# Patient Record
Sex: MALE | Race: WHITE | NOT HISPANIC OR LATINO | URBAN - METROPOLITAN AREA
[De-identification: names, ages, dates, MRNs, and addresses within clinical notes are randomized per-mention and may not be internally consistent; named-entity substitution may affect disease eponyms.]

---

## 2018-02-10 ENCOUNTER — OUTPATIENT (OUTPATIENT)
Dept: OUTPATIENT SERVICES | Facility: HOSPITAL | Age: 83
LOS: 1 days | Discharge: HOME | End: 2018-02-10

## 2018-02-10 DIAGNOSIS — I25.10 ATHEROSCLEROTIC HEART DISEASE OF NATIVE CORONARY ARTERY WITHOUT ANGINA PECTORIS: ICD-10-CM

## 2018-02-10 DIAGNOSIS — B58.81 TOXOPLASMA MYOCARDITIS: ICD-10-CM

## 2018-02-10 DIAGNOSIS — E11.9 TYPE 2 DIABETES MELLITUS WITHOUT COMPLICATIONS: ICD-10-CM

## 2018-02-10 DIAGNOSIS — R94.6 ABNORMAL RESULTS OF THYROID FUNCTION STUDIES: ICD-10-CM

## 2018-02-10 DIAGNOSIS — N42.9 DISORDER OF PROSTATE, UNSPECIFIED: ICD-10-CM

## 2018-02-10 DIAGNOSIS — R78.89 FINDING OF OTHER SPECIFIED SUBSTANCES, NOT NORMALLY FOUND IN BLOOD: ICD-10-CM

## 2019-06-04 ENCOUNTER — INPATIENT (INPATIENT)
Facility: HOSPITAL | Age: 84
LOS: 9 days | Discharge: NURSING, REHAB OR LONG-TERM CARE | End: 2019-06-14
Attending: INTERNAL MEDICINE | Admitting: INTERNAL MEDICINE
Payer: MEDICARE

## 2019-06-04 VITALS
HEIGHT: 68 IN | RESPIRATION RATE: 20 BRPM | TEMPERATURE: 97 F | WEIGHT: 184.09 LBS | SYSTOLIC BLOOD PRESSURE: 171 MMHG | DIASTOLIC BLOOD PRESSURE: 90 MMHG | OXYGEN SATURATION: 96 % | HEART RATE: 91 BPM

## 2019-06-04 DIAGNOSIS — S06.5X9S TRAUMATIC SUBDURAL HEMORRHAGE WITH LOSS OF CONSCIOUSNESS OF UNSPECIFIED DURATION, SEQUELA: ICD-10-CM

## 2019-06-04 LAB
ALBUMIN SERPL ELPH-MCNC: 4 G/DL — SIGNIFICANT CHANGE UP (ref 3.5–5.2)
ALP SERPL-CCNC: 36 U/L — SIGNIFICANT CHANGE UP (ref 30–115)
ALT FLD-CCNC: 33 U/L — SIGNIFICANT CHANGE UP (ref 0–41)
ANION GAP SERPL CALC-SCNC: 18 MMOL/L — HIGH (ref 7–14)
APPEARANCE UR: CLEAR — SIGNIFICANT CHANGE UP
APTT BLD: 33.8 SEC — SIGNIFICANT CHANGE UP (ref 27–39.2)
AST SERPL-CCNC: 32 U/L — SIGNIFICANT CHANGE UP (ref 0–41)
BACTERIA # UR AUTO: ABNORMAL /HPF
BASE EXCESS BLDV CALC-SCNC: 1.6 MMOL/L — SIGNIFICANT CHANGE UP (ref -2–2)
BASOPHILS # BLD AUTO: 0.03 K/UL — SIGNIFICANT CHANGE UP (ref 0–0.2)
BASOPHILS NFR BLD AUTO: 0.2 % — SIGNIFICANT CHANGE UP (ref 0–1)
BILIRUB DIRECT SERPL-MCNC: 0.2 MG/DL — SIGNIFICANT CHANGE UP (ref 0–0.2)
BILIRUB INDIRECT FLD-MCNC: 0.7 MG/DL — SIGNIFICANT CHANGE UP (ref 0.2–1.2)
BILIRUB SERPL-MCNC: 0.9 MG/DL — SIGNIFICANT CHANGE UP (ref 0.2–1.2)
BILIRUB UR-MCNC: NEGATIVE — SIGNIFICANT CHANGE UP
BLD GP AB SCN SERPL QL: SIGNIFICANT CHANGE UP
BUN SERPL-MCNC: 16 MG/DL — SIGNIFICANT CHANGE UP (ref 10–20)
CA-I SERPL-SCNC: 1.16 MMOL/L — SIGNIFICANT CHANGE UP (ref 1.12–1.3)
CALCIUM SERPL-MCNC: 9.1 MG/DL — SIGNIFICANT CHANGE UP (ref 8.5–10.1)
CHLORIDE SERPL-SCNC: 102 MMOL/L — SIGNIFICANT CHANGE UP (ref 98–110)
CK SERPL-CCNC: 221 U/L — SIGNIFICANT CHANGE UP (ref 0–225)
CO2 SERPL-SCNC: 21 MMOL/L — SIGNIFICANT CHANGE UP (ref 17–32)
COLOR SPEC: YELLOW — SIGNIFICANT CHANGE UP
COMMENT - URINE: SIGNIFICANT CHANGE UP
CREAT SERPL-MCNC: 0.9 MG/DL — SIGNIFICANT CHANGE UP (ref 0.7–1.5)
DIFF PNL FLD: NEGATIVE — SIGNIFICANT CHANGE UP
EOSINOPHIL # BLD AUTO: 0 K/UL — SIGNIFICANT CHANGE UP (ref 0–0.7)
EOSINOPHIL NFR BLD AUTO: 0 % — SIGNIFICANT CHANGE UP (ref 0–8)
EPI CELLS # UR: ABNORMAL /HPF
ETHANOL SERPL-MCNC: <10 MG/DL — SIGNIFICANT CHANGE UP
GAS PNL BLDV: 138 MMOL/L — SIGNIFICANT CHANGE UP (ref 136–145)
GAS PNL BLDV: SIGNIFICANT CHANGE UP
GLUCOSE SERPL-MCNC: 187 MG/DL — HIGH (ref 70–99)
GLUCOSE UR QL: 100 MG/DL
HCO3 BLDV-SCNC: 26 MMOL/L — SIGNIFICANT CHANGE UP (ref 22–29)
HCT VFR BLD CALC: 43 % — SIGNIFICANT CHANGE UP (ref 42–52)
HCT VFR BLDA CALC: 48.3 % — HIGH (ref 34–44)
HGB BLD CALC-MCNC: 15.7 G/DL — SIGNIFICANT CHANGE UP (ref 14–18)
HGB BLD-MCNC: 14.1 G/DL — SIGNIFICANT CHANGE UP (ref 14–18)
IMM GRANULOCYTES NFR BLD AUTO: 0.4 % — HIGH (ref 0.1–0.3)
INR BLD: 1.11 RATIO — SIGNIFICANT CHANGE UP (ref 0.65–1.3)
KETONES UR-MCNC: 40
LACTATE BLDV-MCNC: 4.5 MMOL/L — HIGH (ref 0.5–1.6)
LACTATE SERPL-SCNC: 3.9 MMOL/L — HIGH (ref 0.5–2.2)
LEUKOCYTE ESTERASE UR-ACNC: NEGATIVE — SIGNIFICANT CHANGE UP
LIDOCAIN IGE QN: 19 U/L — SIGNIFICANT CHANGE UP (ref 7–60)
LYMPHOCYTES # BLD AUTO: 0.61 K/UL — LOW (ref 1.2–3.4)
LYMPHOCYTES # BLD AUTO: 4.3 % — LOW (ref 20.5–51.1)
MCHC RBC-ENTMCNC: 29.1 PG — SIGNIFICANT CHANGE UP (ref 27–31)
MCHC RBC-ENTMCNC: 32.8 G/DL — SIGNIFICANT CHANGE UP (ref 32–37)
MCV RBC AUTO: 88.7 FL — SIGNIFICANT CHANGE UP (ref 80–94)
MONOCYTES # BLD AUTO: 0.77 K/UL — HIGH (ref 0.1–0.6)
MONOCYTES NFR BLD AUTO: 5.5 % — SIGNIFICANT CHANGE UP (ref 1.7–9.3)
NEUTROPHILS # BLD AUTO: 12.57 K/UL — HIGH (ref 1.4–6.5)
NEUTROPHILS NFR BLD AUTO: 89.6 % — HIGH (ref 42.2–75.2)
NITRITE UR-MCNC: NEGATIVE — SIGNIFICANT CHANGE UP
NRBC # BLD: 0 /100 WBCS — SIGNIFICANT CHANGE UP (ref 0–0)
PCO2 BLDV: 40 MMHG — LOW (ref 41–51)
PH BLDV: 7.42 — SIGNIFICANT CHANGE UP (ref 7.26–7.43)
PH UR: 7 — SIGNIFICANT CHANGE UP (ref 5–8)
PLATELET # BLD AUTO: 181 K/UL — SIGNIFICANT CHANGE UP (ref 130–400)
PO2 BLDV: 25 MMHG — SIGNIFICANT CHANGE UP (ref 20–40)
POTASSIUM BLDV-SCNC: 3.8 MMOL/L — SIGNIFICANT CHANGE UP (ref 3.3–5.6)
POTASSIUM SERPL-MCNC: 4.3 MMOL/L — SIGNIFICANT CHANGE UP (ref 3.5–5)
POTASSIUM SERPL-SCNC: 4.3 MMOL/L — SIGNIFICANT CHANGE UP (ref 3.5–5)
PROT SERPL-MCNC: 6.7 G/DL — SIGNIFICANT CHANGE UP (ref 6–8)
PROT UR-MCNC: 30 MG/DL
PROTHROM AB SERPL-ACNC: 12.8 SEC — SIGNIFICANT CHANGE UP (ref 9.95–12.87)
RBC # BLD: 4.85 M/UL — SIGNIFICANT CHANGE UP (ref 4.7–6.1)
RBC # FLD: 13.6 % — SIGNIFICANT CHANGE UP (ref 11.5–14.5)
SAO2 % BLDV: 47 % — SIGNIFICANT CHANGE UP
SODIUM SERPL-SCNC: 141 MMOL/L — SIGNIFICANT CHANGE UP (ref 135–146)
SP GR SPEC: 1.02 — SIGNIFICANT CHANGE UP (ref 1.01–1.03)
UROBILINOGEN FLD QL: 0.2 MG/DL — SIGNIFICANT CHANGE UP (ref 0.2–0.2)
WBC # BLD: 14.04 K/UL — HIGH (ref 4.8–10.8)
WBC # FLD AUTO: 14.04 K/UL — HIGH (ref 4.8–10.8)

## 2019-06-04 PROCEDURE — 72125 CT NECK SPINE W/O DYE: CPT | Mod: 26

## 2019-06-04 PROCEDURE — 73090 X-RAY EXAM OF FOREARM: CPT | Mod: 26,LT

## 2019-06-04 PROCEDURE — 93010 ELECTROCARDIOGRAM REPORT: CPT

## 2019-06-04 PROCEDURE — 99291 CRITICAL CARE FIRST HOUR: CPT

## 2019-06-04 PROCEDURE — 72170 X-RAY EXAM OF PELVIS: CPT | Mod: 26

## 2019-06-04 PROCEDURE — 70450 CT HEAD/BRAIN W/O DYE: CPT | Mod: 26

## 2019-06-04 PROCEDURE — 71045 X-RAY EXAM CHEST 1 VIEW: CPT | Mod: 26

## 2019-06-04 PROCEDURE — 74177 CT ABD & PELVIS W/CONTRAST: CPT | Mod: 26

## 2019-06-04 PROCEDURE — 73562 X-RAY EXAM OF KNEE 3: CPT | Mod: 26,50

## 2019-06-04 PROCEDURE — 73610 X-RAY EXAM OF ANKLE: CPT | Mod: 26,50

## 2019-06-04 PROCEDURE — 71260 CT THORAX DX C+: CPT | Mod: 26

## 2019-06-04 RX ORDER — SODIUM CHLORIDE 9 MG/ML
1000 INJECTION, SOLUTION INTRAVENOUS
Refills: 0 | Status: DISCONTINUED | OUTPATIENT
Start: 2019-06-04 | End: 2019-06-05

## 2019-06-04 RX ORDER — LEVETIRACETAM 250 MG/1
500 TABLET, FILM COATED ORAL EVERY 12 HOURS
Refills: 0 | Status: DISCONTINUED | OUTPATIENT
Start: 2019-06-04 | End: 2019-06-10

## 2019-06-04 RX ORDER — MEMANTINE HYDROCHLORIDE 10 MG/1
0 TABLET ORAL
Qty: 60 | Refills: 0 | DISCHARGE

## 2019-06-04 RX ORDER — PANTOPRAZOLE SODIUM 20 MG/1
40 TABLET, DELAYED RELEASE ORAL
Refills: 0 | Status: DISCONTINUED | OUTPATIENT
Start: 2019-06-04 | End: 2019-06-12

## 2019-06-04 RX ORDER — LEVETIRACETAM 250 MG/1
1000 TABLET, FILM COATED ORAL ONCE
Refills: 0 | Status: COMPLETED | OUTPATIENT
Start: 2019-06-04 | End: 2019-06-04

## 2019-06-04 RX ORDER — ROSUVASTATIN CALCIUM 5 MG/1
0 TABLET ORAL
Qty: 90 | Refills: 0 | DISCHARGE

## 2019-06-04 RX ORDER — CHLORHEXIDINE GLUCONATE 213 G/1000ML
1 SOLUTION TOPICAL
Refills: 0 | Status: DISCONTINUED | OUTPATIENT
Start: 2019-06-04 | End: 2019-06-14

## 2019-06-04 RX ORDER — ACETAMINOPHEN 500 MG
650 TABLET ORAL EVERY 6 HOURS
Refills: 0 | Status: DISCONTINUED | OUTPATIENT
Start: 2019-06-04 | End: 2019-06-13

## 2019-06-04 RX ORDER — SODIUM CHLORIDE 9 MG/ML
1000 INJECTION, SOLUTION INTRAVENOUS ONCE
Refills: 0 | Status: COMPLETED | OUTPATIENT
Start: 2019-06-04 | End: 2019-06-04

## 2019-06-04 RX ORDER — ONDANSETRON 8 MG/1
4 TABLET, FILM COATED ORAL ONCE
Refills: 0 | Status: COMPLETED | OUTPATIENT
Start: 2019-06-04 | End: 2019-06-04

## 2019-06-04 RX ADMIN — LEVETIRACETAM 400 MILLIGRAM(S): 250 TABLET, FILM COATED ORAL at 21:50

## 2019-06-04 RX ADMIN — SODIUM CHLORIDE 1000 MILLILITER(S): 9 INJECTION, SOLUTION INTRAVENOUS at 18:36

## 2019-06-04 RX ADMIN — ONDANSETRON 4 MILLIGRAM(S): 8 TABLET, FILM COATED ORAL at 18:00

## 2019-06-04 NOTE — ED PROVIDER NOTE - PROGRESS NOTE DETAILS
ATTENDING NOTE: 85 y/o male states he fell in his house. No LOC. Noted by daughter to have bruising to head. No N/V. No seizure activity.  O/E: Constitutional: Non-toxic in appearance. Head: Large hematoma to left parieto-temporal area.. Eyes: PERRL. EOMI. ENT: No hemotympanum. Neck: No c-spine tenderness. Pulmonary: Lungs equal b/l. GI: ABD soft, no tenderness. Extremities: Pelvis stable, no hip tenderness. Extremities with skin tear to left forearm. Extensive ecchymosis to right elbow and forearm. No deformity. Back: No vertebral tenderness. Large abrasions and hematoma to right upper back. Neuro: A&Ox3, GCS 15.   A/P: CT's. Tdap. s/o Dr. Antonio accepting s/o from Dr. Reynolds. ct head obtained, suspected ICH. trauma consult placed. call placed to radiology for read.

## 2019-06-04 NOTE — ED PROVIDER NOTE - OBJECTIVE STATEMENT
83 y/o male BIBEMS, patient states he fell in his house. No LOC. Noted by daughter to have bruising to head. No N/V. No seizure activity. no blood thinners. No recent illness, no fever, no cp or sob. no abdominal pain.

## 2019-06-04 NOTE — CONSULT NOTE ADULT - ATTENDING COMMENTS
agree with sicu admission for risk of neurologic and respiratory deterioration agree with sicu admission for risk of neurologic and respiratory deterioration  provided over 30 minutes of direct critical care to this patient

## 2019-06-04 NOTE — ED PROVIDER NOTE - CLINICAL SUMMARY MEDICAL DECISION MAKING FREE TEXT BOX
pt presented for eval of confusion and fall. pt with fall last night. today felt weak and had hard time getting up from bed. daughter went to pts house, found pt confused and not himself. pt found to have sah, subdural. seen by neurosurgery, keppra, repeat ct head. seen by trauma team. admit to SICU.

## 2019-06-04 NOTE — CHART NOTE - NSCHARTNOTEFT_GEN_A_CORE
sdh and sah with no neurologic changes, will admit to sicu and observe over night f/u neurodurgical recomendations

## 2019-06-04 NOTE — ED ADULT NURSE NOTE - NSFALLRSKASSISTTYPE_ED_ALL_ED
Problem: Patient Care Overview  Goal: Individualization & Mutuality  Patient will be cooperative with assessments  Patient will eat at least 75% meals  Patient will sleep 6-8 hours     0015 in bed with easy respirations,presenting sleeping.[id number] patient continues to sleep at this  Time.    Problem: Depressive Symptoms  Goal: Depressive Symptoms  Signs and symptoms of listed problems will be absent or manageable.  Patient's depression will be improved by discharge  Patient will remain calm and in control    Patient will verbalize if anxiety and or anger is presenting a problem.   0015 in bed with easy respirations, presenting sleeping.       Standing/Walking/Toileting

## 2019-06-04 NOTE — ED ADULT NURSE REASSESSMENT NOTE - NS ED NURSE REASSESS COMMENT FT1
pt resting comfortably, denies any discomfort. no distress noted at this time. iv intact, safety maintained, on monitor. VSS. awaiting additional testing. frequent urination. md aware. periods of confusion.

## 2019-06-04 NOTE — ED PROVIDER NOTE - CARE PLAN
Principal Discharge DX:	Subdural hematoma due to concussion, with loss of consciousness, sequela  Secondary Diagnosis:	Fall Principal Discharge DX:	Subdural hematoma due to concussion, with loss of consciousness, sequela  Secondary Diagnosis:	Fall  Secondary Diagnosis:	Subarachnoid hemorrhage, traumatic

## 2019-06-04 NOTE — ED ADULT NURSE NOTE - CHIEF COMPLAINT QUOTE
patient bought in by daughter for evaluation. as per patients daughter "he is not acting like himself." patient normally active, lives alone. patient is A&O x4 in triage. patient has a bruise on right elbow, an abrasion to the left underside of arm and a bump on the back right of his head. patient does not recall how he sustained the injuries. patients gait is unsteady.  unknown if patient is on blood thinners.  pt taken to critical care.

## 2019-06-04 NOTE — ED PROVIDER NOTE - PHYSICAL EXAMINATION
Non-toxic in appearance. Large hematoma to left parieto-temporal area. PERRL. EOMI.No hemotympanum. Neck: No c-spine tenderness. No nasal discharge. MMM. Neck supple, non tender, full ROM. RRR no MRG +S1S2. CTA b/l. Abd s NT ND +BS. Ext WWP x4, moving all extremities, no edema. 2+ equal pulses throughout. Cooperative, appropriate. CN2-12 grossly intact no sensory or motor deficits throughout, no drift, rapid alternating wnl, no ataxia, neg romberg. skin tear to left forearm, ecchymosis to right elbow and forearm, abrasions to upper back. GCS 15

## 2019-06-04 NOTE — ED ADULT NURSE NOTE - LOCATION
Abrasions to: R upper back, mid lumbar, left buttock, L occipital abrasion, R knee abrasion, R ankle abrasion, R 2-5 toes, L knee abrasion, L ankle, L 1-4 toes, L forearm, L elbow, R elbow, R upper arm. Bruise: R wrist

## 2019-06-04 NOTE — CONSULT NOTE ADULT - SUBJECTIVE AND OBJECTIVE BOX
SICU Consultation Note  =====================================================  84y Male  HPI:  84M w/ PMHx of HTN, HLD, BPH, Glaucoma, dementia? s/p fall at home, unwitnessed, lives alone. Patients daughter called her father this morning and he did not sound normal to her. He said he was tired and went back to sleep. This was at 10am and very unusual for the pt as per daughter. Daughter then drove from [place] to her fathers home and noted pt has large contusion to his scalp. Daughter then brought pt to the ED for evaluation. Patient does not take any blood thinners.     PAST MEDICAL & SURGICAL HISTORY:  HTN, HLD, BPH, Glaucoma    Home Meds: Home Medications:  AMLODIPINE   TAB 2.5MG:  (2019 23:38)  ATENOLOL     TAB 25MG:  (2019 23:38)  FLUOROMETHOL ELLA 0.1% OP:  (2019 23:38)  MEMANTINE    TAB HCL 10MG:  (2019 23:38)  ROSUVASTATIN TAB 5MG:  (2019 23:38)  TAMSULOSIN   CAP 0.4MG:  (2019 23:38)  TIMOLOL MAL  SOL 0.5% OP:  (2019 23:38)    Allergies: AllergiesNo Known Allergies    Advanced Directives: Presumed Full Code     CURRENT MEDICATIONS:   --------------------------------------------------------------------------------------  Neurologic Medications  acetaminophen   Tablet .. 650 milliGRAM(s) Oral every 6 hours  levETIRAcetam  IVPB 500 milliGRAM(s) IV Intermittent every 12 hours    Gastrointestinal Medications  lactated ringers. 1000 milliLiter(s) IV Continuous <Continuous>  pantoprazole    Tablet 40 milliGRAM(s) Oral before breakfast    Genitourinary Medications    Hematologic/Oncologic Medications    Antimicrobial/Immunologic Medications    Endocrine/Metabolic Medications    Topical/Other Medications  chlorhexidine 4% Liquid 1 Application(s) Topical <User Schedule>    --------------------------------------------------------------------------------------    VITAL SIGNS, INS/OUTS (last 24 hours):  --------------------------------------------------------------------------------------  ICU Vital Signs Last 24 Hrs  T(C): 36.3 (2019 16:01), Max: 36.3 (2019 16:01)  T(F): 97.3 (2019 16:01), Max: 97.3 (2019 16:01)  HR: 91 (2019 16:01) (91 - 91)  BP: 171/90 (2019 16:01) (171/90 - 171/90)  RR: 20 (2019 16:01) (20 - 20)  SpO2: 96% (2019 16:01) (96% - 96%)    I&O's Summary    --------------------------------------------------------------------------------------  EXAM:  General/Neuro  GCS: 15  Exam: Normal, NAD, alert, oriented x 3, no focal deficits. PERRLA, Abrasion w/ associated left posterior scalp hematoma.  Face symmetrical tongue is midline speech is clear and fluent  Motor: 5/5 UE/LE all muscle groups     Respiratory  Exam: Lungs clear to auscultation, Normal expansion/effort.     Cardiovascular  Exam: S1, S2.  Regular rate and rhythm. No Peripheral edema     GI  Exam: Abdomen soft, Non-tender, Non-distended.  Current Diet:  NPO    Extremities  Exam: Extremities warm, pink, well-perfused.      :   Exam: Freely voiding    Tubes/Lines/Drains  ***  [x] Peripheral IV    LABS  --------------------------------------------------------------------------------------             14.1   14.04 )-----------( 181      ( 2019 16:40 )             43.0     06-04    141  |  102  |  16  ----------------------------<  187<H>  4.3   |  21  |  0.9    Ca    9.1      2019 16:40    TPro  6.7  /  Alb  4.0  /  TBili  0.9  /  DBili  0.2  /  AST  32  /  ALT  33  /  AlkPhos  36  06-04    LIVER FUNCTIONS - ( 2019 16:40 )  Alb: 4.0 g/dL / Pro: 6.7 g/dL / ALK PHOS: 36 U/L / ALT: 33 U/L / AST: 32 U/L / GGT: x           CARDIAC MARKERS ( 2019 17:42 )  x     / x     / 221 U/L / x     / x        PT/INR - ( 2019 16:40 )   PT: 12.80 sec;   INR: 1.11 ratio      PTT - ( 2019 16:40 )  PTT:33.8 sec    CAPILLARY BLOOD GLUCOSE    POCT Blood Glucose.: 166 mg/dL (2019 16:18)    Urinalysis Basic - ( 2019 18:55 )    Color: Yellow / Appearance: Clear / S.020 / pH: x  Gluc: x / Ketone: 40  / Bili: Negative / Urobili: 0.2 mg/dL   Blood: x / Protein: 30 mg/dL / Nitrite: Negative   Leuk Esterase: Negative / RBC: x / WBC x   Sq Epi: x / Non Sq Epi: Occasional /HPF / Bacteria: Few /HPF  --------------------------------------------------------------------------------------  IMAGING RESULTS  CT Head No Cont (19 @ 20:18) >    IMPRESSION:    1.  Large left parietal/occipital extracalvarial hematoma. Slight   widening of the left lambdoid suture suspicious for nondisplaced   diastatic fracture.    2.  Subdural hemorrhage in the right frontotemporal convexity measuring   up to 8 mm in width and within the left cerebellopontine angle cistern   measuring 8 mm in width.    3.  Hemorrhagic contusions in the right frontal and temporal lobes and   left cerebellum.      4.  Scattered subarachnoid hemorrhage and small layering intraventricular   hemorrhage.    CT Cervical Spine No Cont (19 @ 20:19) >  IMPRESSION: No acute cervical spine pathology    CT Chest w/ IV Cont (19 @ 20:22) >  IMPRESSION:    No CT evidence for acute traumatic injury to the chest, abdomen or pelvis.    CT Abdomen and Pelvis w/ IV Cont (19 @ 20:23) >  IMPRESSION:    No CT evidence for acute traumatic injury to the chest, abdomen or pelvis.    ASSESSMENT:  84M w/ PMHx of HTN, HLD, BPH, Glaucoma s/p fall at home, unwitnessed, lives alone. +HT, unknown LOC, -AC. Trauma work up with SDH right frontotemporal, scattered SAH, small layering interventricular hemorrhage, Hemorrhagic contusion of Right frontal, temporal lobes, left cerebellum, nondisplaced diastatic fracture.     PLAN:    Neurologic: s/p fall, unwitnessed, syncope? SDH right frontotemporal, scattered SAH, small layering interventricular hemorrhage, Hemorrhagic contusion of Right frontal, temporal lobes, left cerebellum, nondisplaced diastatic fracture. GCS 15, AAO x 3, Monitor for changes in mental status, Pain regimen: Tylenol.  - Neuro checks: q1h  , Seizure ppx: Keppra.   - Significant imaging: CTH: SDH right frontotemporal, scattered SAH, small layering interventricular hemorrhage, Hemorrhagic contusion of Right frontal, temporal lobes, left cerebellum, nondisplaced diastatic fracture.   - Syncope workup w/ ECHO, EEG.   - Hx of dementia? takes memantine at home     Neurologic Medications  acetaminophen   Tablet .. 650 milliGRAM(s) Oral every 6 hours  levETIRAcetam  IVPB 500 milliGRAM(s) IV Intermittent every 12 hours    Respiratory: No Acute Dx, monitor for respiratory ditress, pulse oxymetry, Incentive spirometer, O2NC PRN, maintain Sat >90%  RR: 20 (19 @ 16:01) (20 - 20)  SpO2: 96% (19 @ 16:01) (96% - 96%)    Cardiovascular: No Acute Dx, Keep Normotensive,   - Hx of HTN, HLD takes atenolol, amlodipine, rosuvastatin at home    HR: 91 (19 @ 16:01) (91 - 91)  BP: 171/90 (19 @ 16:01) (171/90 - 171/90)    ECHO: Pending  EKG:  (19 @ 17:17)    Gastrointestinal/Nutrition: No Acute Dx, NPO, LR @100, GI Prophylaxis w/ Protonix 40mg Daily, Bowel regimen:, senna,    GastrointestinalMedications  pantoprazole    Tablet 40 milliGRAM(s) Oral before breakfast    Renal/Genitourinary: No Acute Dx, Freely Voiding. Strict I&O's, Replete electrolytes PRN.  - Hx of BPH takes flomax at home restarted.     BUN/Creat: 16/0.9 (19 @ 16:40)  Na:141 (19 @ 16:40)  K:4.3 (19 @ 16:40)     Hematologic: No Acute Dx, Monitor Hb, SCDs,, Hold Chemoprophylaxis    Hb:14.1 (19 @ 16:40)  Plt:181 (19 @ 16:40)  INR:1.11 (19 @ 16:40)  PTT:33.8 (19 @ 16:40)  Lactate (Blood):3.9 (19 @ 16:40)    Infectious Disease: No Acute Dx. Afebrile, WBC 14, Monitor for fevers, leukocytosis  T(F): 97.3 (19 @ 16:01), Max: 97.3 (19 @ 16:01)    Endocrine: No Acute Dx. Monitor FS q6h.  - Hx of HLD restarted on atorvastatin     Glucose:  166 (19 @ 16:18)    Lines/Tubes: PIV,    Disposition: Admit to SICU

## 2019-06-04 NOTE — ED PROVIDER NOTE - NS ED ROS FT
Constitutional: See HPI.  Eyes: No visual changes, eye pain or discharge. No Photophobia  ENMT: No hearing changes, pain, discharge or infections. No neck pain or stiffness. No limited ROM  Cardiac: No SOB or edema. No chest pain with exertion.  Respiratory: No cough or respiratory distress. No hemoptysis.   GI: No nausea, vomiting, diarrhea or abdominal pain.  : No dysuria, frequency or burning. No Discharge  MS: No myalgia, muscle weakness, joint pain or back pain.  Neuro: No headache or weakness. No LOC.  Skin: No skin rash.  Except as documented in the HPI, all other systems are negative.

## 2019-06-04 NOTE — CONSULT NOTE ADULT - SUBJECTIVE AND OBJECTIVE BOX
Pt lives alone on  and nearest daughter lives outside [place]. Daughter called her father this morning and he did not sound normal to her. He said he was tired and went back to sleep. This was at 10am and very unusual for the pt as per daughter because pt rises early in morning and does not oversleep. Daughter then drove from [place] to her fathers home and noted pt has large contusion to his scalp. Daughter then brought pt to the ED for evaluation. Pt states he took a sharp turn in his kitchen and fell. Does not recall hitting his head. Denies LOC but unsure. Pt does not take aspirin or any blood thinners      Allergies    No Known Allergies      Vital Signs Last 24 Hrs  T(C): 36.3 (2019 16:01), Max: 36.3 (2019 16:01)  T(F): 97.3 (2019 16:01), Max: 97.3 (2019 16:01)  HR: 91 (2019 16:01) (91 - 91)  BP: 171/90 (2019 16:01) (171/90 - 171/90)  BP(mean): --  RR: 20 (2019 16:01) (20 - 20)  SpO2: 96% (2019 16:01) (96% - 96%)    PHYSICAL EXAM:    GENERAL: NAD, well-groomed, well-developed, no distress  HEAD:  Large left posterior scalp hematoma  EYES: EOMI, PERRLA, conjunctiva and sclera clear  NERVOUS SYSTEM:  Awake  alert oriented to self, place situation   Fund of knowledge, recent and remote memory are intact   Mood; normal affect   CN II-XII intact PERRRL, EOMI, no ptosis, no Nystagmus, normal shoulder shrug   Face symmetrical tongue is midline speech is clear and fluent  Motor: 5/5 UE/LE all muscle groups   Sensory: No deficit to light touch   Gait is not tested      EXTREMITIES:  2+ Peripheral Pulses, No clubbing, cyanosis, or edema      LABS:                        14.1   14.04 )-----------( 181      ( 2019 16:40 )             43.0     06-04    141  |  102  |  16  ----------------------------<  187<H>  4.3   |  21  |  0.9    Ca    9.1      2019 16:40    TPro  6.7  /  Alb  4.0  /  TBili  0.9  /  DBili  0.2  /  AST  32  /  ALT  33  /  AlkPhos  36  -    PT/INR - ( 2019 16:40 )   PT: 12.80 sec;   INR: 1.11 ratio         PTT - ( 2019 16:40 )  PTT:33.8 sec  Urinalysis Basic - ( 2019 18:55 )    Color: Yellow / Appearance: Clear / S.020 / pH: x  Gluc: x / Ketone: 40  / Bili: Negative / Urobili: 0.2 mg/dL   Blood: x / Protein: 30 mg/dL / Nitrite: Negative   Leuk Esterase: Negative / RBC: x / WBC x   Sq Epi: x / Non Sq Epi: Occasional /HPF / Bacteria: Few /HPF        RADIOLOGY & ADDITIONAL TESTS:  < from: CT Cervical Spine No Cont (19 @ 20:19) >      < end of copied text >    < from: CT Head No Cont (19 @ 20:18) >    < from: CT Cervical Spine No Cont (19 @ 20:19) >  EXAM:  CT CERVICAL SPINE            PROCEDURE DATE:  2019            INTERPRETATION:  Clinical History / Reason for exam: Trauma    TECHNIQUE: Axial imaging is performed through the skull with sagittal   coronal reformats evaluated in soft tissue bone and lung windows.    FINDINGS: There is inflammatory change within the left occipital region   completely described on  head CT.    Bony structures demonstrate heterogeneous diffuse sclerosis with no   acutely displaced fracture. There is grade 1 degenerative   spondylolisthesis of C4-5 and C5-6. Severe atlantoaxial degenerative   changes present. Severe C4-5 and C6-7 degenerative disc disease present.    Upper lung fields are clear. No prevertebral soft tissue swelling.    IMPRESSION: No acute cervical spine pathology    < end of copied text >    EXAM:  CT BRAIN            PROCEDURE DATE:  2019            INTERPRETATION:  Clinical History / Reason for exam: Trauma    TECHNIQUE: Noncontrast head CT. Contiguous CT axial images of the head   from the base to the vertex.    COMPARISON: None available    FINDINGS:    There is subdural hemorrhage over the right frontal and temporal   convexities measuring up to 8 mm in width, and in the left   cerebellopontine angle cistern measuring 8 mm.    There are right frontal, right temporal and left cerebellar hemorrhagic   contusions.    There is scattered subarachnoid hemorrhage and small intraventricular   hemorrhage.    There is large left parietal/occipital extracalvarial hematoma and soft   tissue swelling. No depressed calvarial fracture however there is slight   widening of the left lambdoid suture compared to the right suspicious for   nondisplaced diastatic fracture.    There is no midline shift. Moderate cerebral atrophy is noted.    The paranasal sinuses and mastoids are clear.    IMPRESSION:    1.  Large left parietal/occipital extracalvarial hematoma. Slight   widening of the left lambdoid suture suspicious for nondisplaced   diastatic fracture.    2.  Subdural hemorrhage in the right frontotemporal convexity measuring   up to 8 mm in width and within the left cerebellopontine angle cistern   measuring 8 mm in width.    3.  Hemorrhagic contusions in the right frontal and temporal lobes and   left cerebellum.      4.  Scattered subarachnoid hemorrhage and small layering intraventricular   hemorrhage.    Discussed with Dr. Antonio 8:35 pm 19    < end of copied text >

## 2019-06-04 NOTE — ED ADULT NURSE NOTE - OBJECTIVE STATEMENT
patient awake and alert [facility] b/l hearing aides in place. patient states he fell - un aware of time line . bruising noted to left posterior head arms and upper back. b/l skin tears to posterior arms . no active bleeding. lung sounds clear chest rise equal moves all extremities with purpose no deformities noted. pupils equal and reactive

## 2019-06-04 NOTE — ED ADULT NURSE NOTE - NSIMPLEMENTINTERV_GEN_ALL_ED
Implemented All Fall Risk Interventions:  [place] to call system. Call bell, personal items and telephone within reach. Instruct patient to call for assistance. Room bathroom lighting operational. Non-slip footwear when patient is off stretcher. Physically safe environment: no spills, clutter or unnecessary equipment. Stretcher in lowest position, wheels locked, appropriate side rails in place. Provide visual cue, wrist band, yellow gown, etc. Monitor gait and stability. Monitor for mental status changes and reorient to person, place, and time. Review medications for side effects contributing to fall risk. Reinforce activity limits and safety measures with patient and family.

## 2019-06-04 NOTE — CONSULT NOTE ADULT - PROBLEM SELECTOR RECOMMENDATION 9
Keppra for seizure prophylaxis  Admit to ICU for q one hour neuro checks  Repeat CT Brain in 6 hrs  If pt has any acute change in neuro exam call neurosurgery and obtain CT Brain immediately

## 2019-06-04 NOTE — CONSULT NOTE ADULT - ATTENDING COMMENTS
At this time there is no indication for neurosurgical intervention, however, given the severity of the CT, close observation in ICU is indicated..

## 2019-06-05 LAB
ANION GAP SERPL CALC-SCNC: 17 MMOL/L — HIGH (ref 7–14)
BUN SERPL-MCNC: 15 MG/DL — SIGNIFICANT CHANGE UP (ref 10–20)
CALCIUM SERPL-MCNC: 8.6 MG/DL — SIGNIFICANT CHANGE UP (ref 8.5–10.1)
CHLORIDE SERPL-SCNC: 101 MMOL/L — SIGNIFICANT CHANGE UP (ref 98–110)
CO2 SERPL-SCNC: 22 MMOL/L — SIGNIFICANT CHANGE UP (ref 17–32)
CREAT SERPL-MCNC: 0.9 MG/DL — SIGNIFICANT CHANGE UP (ref 0.7–1.5)
CULTURE RESULTS: NO GROWTH — SIGNIFICANT CHANGE UP
GLUCOSE SERPL-MCNC: 149 MG/DL — HIGH (ref 70–99)
HCT VFR BLD CALC: 40 % — LOW (ref 42–52)
HGB BLD-MCNC: 13.4 G/DL — LOW (ref 14–18)
MAGNESIUM SERPL-MCNC: 2 MG/DL — SIGNIFICANT CHANGE UP (ref 1.8–2.4)
MCHC RBC-ENTMCNC: 29.6 PG — SIGNIFICANT CHANGE UP (ref 27–31)
MCHC RBC-ENTMCNC: 33.5 G/DL — SIGNIFICANT CHANGE UP (ref 32–37)
MCV RBC AUTO: 88.3 FL — SIGNIFICANT CHANGE UP (ref 80–94)
NRBC # BLD: 0 /100 WBCS — SIGNIFICANT CHANGE UP (ref 0–0)
PHOSPHATE SERPL-MCNC: 3.3 MG/DL — SIGNIFICANT CHANGE UP (ref 2.1–4.9)
PLATELET # BLD AUTO: 171 K/UL — SIGNIFICANT CHANGE UP (ref 130–400)
POTASSIUM SERPL-MCNC: 3.7 MMOL/L — SIGNIFICANT CHANGE UP (ref 3.5–5)
POTASSIUM SERPL-SCNC: 3.7 MMOL/L — SIGNIFICANT CHANGE UP (ref 3.5–5)
RBC # BLD: 4.53 M/UL — LOW (ref 4.7–6.1)
RBC # FLD: 13.9 % — SIGNIFICANT CHANGE UP (ref 11.5–14.5)
SODIUM SERPL-SCNC: 140 MMOL/L — SIGNIFICANT CHANGE UP (ref 135–146)
SPECIMEN SOURCE: SIGNIFICANT CHANGE UP
WBC # BLD: 15.23 K/UL — HIGH (ref 4.8–10.8)
WBC # FLD AUTO: 15.23 K/UL — HIGH (ref 4.8–10.8)

## 2019-06-05 PROCEDURE — 99291 CRITICAL CARE FIRST HOUR: CPT

## 2019-06-05 PROCEDURE — 70450 CT HEAD/BRAIN W/O DYE: CPT | Mod: 26

## 2019-06-05 PROCEDURE — 99222 1ST HOSP IP/OBS MODERATE 55: CPT

## 2019-06-05 PROCEDURE — 93306 TTE W/DOPPLER COMPLETE: CPT | Mod: 26

## 2019-06-05 RX ORDER — HALOPERIDOL DECANOATE 100 MG/ML
5 INJECTION INTRAMUSCULAR ONCE
Refills: 0 | Status: COMPLETED | OUTPATIENT
Start: 2019-06-05 | End: 2019-06-05

## 2019-06-05 RX ORDER — HALOPERIDOL DECANOATE 100 MG/ML
2.5 INJECTION INTRAMUSCULAR ONCE
Refills: 0 | Status: COMPLETED | OUTPATIENT
Start: 2019-06-05 | End: 2019-06-05

## 2019-06-05 RX ORDER — FLUOROMETHOLONE 1 MG/ML
1 SOLUTION/ DROPS OPHTHALMIC DAILY
Refills: 0 | Status: DISCONTINUED | OUTPATIENT
Start: 2019-06-05 | End: 2019-06-14

## 2019-06-05 RX ORDER — AMLODIPINE BESYLATE 2.5 MG/1
2.5 TABLET ORAL ONCE
Refills: 0 | Status: COMPLETED | OUTPATIENT
Start: 2019-06-05 | End: 2019-06-05

## 2019-06-05 RX ORDER — HEPARIN SODIUM 5000 [USP'U]/ML
5000 INJECTION INTRAVENOUS; SUBCUTANEOUS EVERY 8 HOURS
Refills: 0 | Status: DISCONTINUED | OUTPATIENT
Start: 2019-06-05 | End: 2019-06-10

## 2019-06-05 RX ORDER — TIMOLOL 0.5 %
1 DROPS OPHTHALMIC (EYE) DAILY
Refills: 0 | Status: DISCONTINUED | OUTPATIENT
Start: 2019-06-05 | End: 2019-06-14

## 2019-06-05 RX ORDER — ATORVASTATIN CALCIUM 80 MG/1
20 TABLET, FILM COATED ORAL AT BEDTIME
Refills: 0 | Status: DISCONTINUED | OUTPATIENT
Start: 2019-06-05 | End: 2019-06-14

## 2019-06-05 RX ORDER — TAMSULOSIN HYDROCHLORIDE 0.4 MG/1
0.4 CAPSULE ORAL AT BEDTIME
Refills: 0 | Status: DISCONTINUED | OUTPATIENT
Start: 2019-06-05 | End: 2019-06-14

## 2019-06-05 RX ORDER — MEMANTINE HYDROCHLORIDE 10 MG/1
10 TABLET ORAL DAILY
Refills: 0 | Status: DISCONTINUED | OUTPATIENT
Start: 2019-06-05 | End: 2019-06-14

## 2019-06-05 RX ORDER — LABETALOL HCL 100 MG
10 TABLET ORAL ONCE
Refills: 0 | Status: COMPLETED | OUTPATIENT
Start: 2019-06-05 | End: 2019-06-05

## 2019-06-05 RX ORDER — ATENOLOL 25 MG/1
25 TABLET ORAL DAILY
Refills: 0 | Status: DISCONTINUED | OUTPATIENT
Start: 2019-06-05 | End: 2019-06-12

## 2019-06-05 RX ADMIN — LEVETIRACETAM 420 MILLIGRAM(S): 250 TABLET, FILM COATED ORAL at 17:55

## 2019-06-05 RX ADMIN — CHLORHEXIDINE GLUCONATE 1 APPLICATION(S): 213 SOLUTION TOPICAL at 05:38

## 2019-06-05 RX ADMIN — Medication 650 MILLIGRAM(S): at 01:37

## 2019-06-05 RX ADMIN — LEVETIRACETAM 420 MILLIGRAM(S): 250 TABLET, FILM COATED ORAL at 05:36

## 2019-06-05 RX ADMIN — ATORVASTATIN CALCIUM 20 MILLIGRAM(S): 80 TABLET, FILM COATED ORAL at 21:32

## 2019-06-05 RX ADMIN — AMLODIPINE BESYLATE 2.5 MILLIGRAM(S): 2.5 TABLET ORAL at 05:33

## 2019-06-05 RX ADMIN — TAMSULOSIN HYDROCHLORIDE 0.4 MILLIGRAM(S): 0.4 CAPSULE ORAL at 21:32

## 2019-06-05 RX ADMIN — FLUOROMETHOLONE 1 DROP(S): 1 SOLUTION/ DROPS OPHTHALMIC at 14:10

## 2019-06-05 RX ADMIN — ATENOLOL 25 MILLIGRAM(S): 25 TABLET ORAL at 05:36

## 2019-06-05 RX ADMIN — PANTOPRAZOLE SODIUM 40 MILLIGRAM(S): 20 TABLET, DELAYED RELEASE ORAL at 07:00

## 2019-06-05 RX ADMIN — Medication 650 MILLIGRAM(S): at 17:55

## 2019-06-05 RX ADMIN — Medication 650 MILLIGRAM(S): at 05:38

## 2019-06-05 RX ADMIN — Medication 650 MILLIGRAM(S): at 05:33

## 2019-06-05 RX ADMIN — Medication 1 DROP(S): at 14:10

## 2019-06-05 RX ADMIN — Medication 650 MILLIGRAM(S): at 11:49

## 2019-06-05 RX ADMIN — HALOPERIDOL DECANOATE 5 MILLIGRAM(S): 100 INJECTION INTRAMUSCULAR at 02:06

## 2019-06-05 RX ADMIN — Medication 650 MILLIGRAM(S): at 17:26

## 2019-06-05 RX ADMIN — HEPARIN SODIUM 5000 UNIT(S): 5000 INJECTION INTRAVENOUS; SUBCUTANEOUS at 21:32

## 2019-06-05 RX ADMIN — MEMANTINE HYDROCHLORIDE 10 MILLIGRAM(S): 10 TABLET ORAL at 11:49

## 2019-06-05 RX ADMIN — Medication 10 MILLIGRAM(S): at 17:48

## 2019-06-05 RX ADMIN — Medication 650 MILLIGRAM(S): at 12:30

## 2019-06-05 NOTE — PROGRESS NOTE ADULT - ASSESSMENT
ASSESSMENT:  84M w/ PMHx of HTN, HLD, BPH, Glaucoma s/p fall at home, unwitnessed, lives alone. +HT, unknown LOC, -AC. Trauma work up with SDH right frontotemporal, scattered SAH, small layering interventricular hemorrhage, Hemorrhagic contusion of Right frontal, temporal lobes, left cerebellum, nondisplaced diastatic fracture.     PLAN:    Neurologic: s/p fall, unwitnessed, syncope? SDH right frontotemporal, scattered SAH, small layering interventricular hemorrhage, Hemorrhagic contusion of Right frontal, temporal lobes, left cerebellum, nondisplaced diastatic fracture. GCS 15, AAO x 3, Monitor for changes in mental status, Pain regimen: Tylenol.  - Neuro checks: q1h  , Seizure ppx: Keppra.   - Significant imaging: CTH: SDH right frontotemporal, scattered SAH, small layering interventricular hemorrhage, Hemorrhagic contusion of Right frontal, temporal lobes, left cerebellum, nondisplaced diastatic fracture.   - Syncope workup w/ ECHO, EEG.   - Hx of dementia? takes memantine at home     Neurologic Medications  acetaminophen   Tablet .. 650 milliGRAM(s) Oral every 6 hours  levETIRAcetam  IVPB 500 milliGRAM(s) IV Intermittent every 12 hours    Respiratory: No Acute Dx, monitor for respiratory ditress, pulse oxymetry, Incentive spirometer, O2NC PRN, maintain Sat >90%  RR: 20 (06-04-19 @ 16:01) (20 - 20)  SpO2: 96% (06-04-19 @ 16:01) (96% - 96%)    Cardiovascular: No Acute Dx, Keep Normotensive,   - Hx of HTN, HLD takes atenolol, amlodipine, rosuvastatin at home    HR: 91 (06-04-19 @ 16:01) (91 - 91)  BP: 171/90 (06-04-19 @ 16:01) (171/90 - 171/90)    ECHO: Pending  EKG:  (06-04-19 @ 17:17)    Gastrointestinal/Nutrition: No Acute Dx, NPO, LR @100, GI Prophylaxis w/ Protonix 40mg Daily, Bowel regimen:, senna,    GastrointestinalMedications  pantoprazole    Tablet 40 milliGRAM(s) Oral before breakfast    Renal/Genitourinary: No Acute Dx, Freely Voiding. Strict I&O's, Replete electrolytes PRN.  - Hx of BPH takes flomax at home restarted.     BUN/Creat: 16/0.9 (06-04-19 @ 16:40)  Na:141 (06-04-19 @ 16:40)  K:4.3 (06-04-19 @ 16:40)     Hematologic: No Acute Dx, Monitor Hb, SCDs,, Hold Chemoprophylaxis    Hb:14.1 (06-04-19 @ 16:40)  Plt:181 (06-04-19 @ 16:40)  INR:1.11 (06-04-19 @ 16:40)  PTT:33.8 (06-04-19 @ 16:40)  Lactate (Blood):3.9 (06-04-19 @ 16:40)    Infectious Disease: No Acute Dx. Afebrile, WBC 14, Monitor for fevers, leukocytosis  T(F): 97.3 (06-04-19 @ 16:01), Max: 97.3 (06-04-19 @ 16:01)    Endocrine: No Acute Dx. Monitor FS q6h.  - Hx of HLD restarted on atorvastatin     Glucose:  166 (06-04-19 @ 16:18)    Lines/Tubes: PIV.    Disposition: Continue ICU care ASSESSMENT:  84M w/ PMHx of HTN, HLD, BPH, Glaucoma s/p fall at home, unwitnessed, lives alone. +HT, unknown LOC, -AC. Trauma work up with SDH right frontotemporal, scattered SAH, small layering interventricular hemorrhage, Hemorrhagic contusion of Right frontal, temporal lobes, left cerebellum, nondisplaced diastatic fracture.     PLAN:    Neurologic: s/p fall, unwitnessed, syncope? SDH right frontotemporal, scattered SAH, small layering interventricular hemorrhage, Hemorrhagic contusion of Right frontal, temporal lobes, left cerebellum, nondisplaced diastatic fracture. GCS 15, AAO x 3, Monitor for changes in mental status, Pain regimen: Tylenol.  - Neuro checks: q1h  , Seizure ppx: Keppra.   - Significant imaging: CTH: SDH right frontotemporal, scattered SAH, small layering interventricular hemorrhage, Hemorrhagic contusion of Right frontal, temporal lobes, left cerebellum, nondisplaced diastatic fracture.   - Syncope workup w/ ECHO, EEG.   - Hx of dementia? takes memantine at home     Neurologic Medications  acetaminophen   Tablet .. 650 milliGRAM(s) Oral every 6 hours  levETIRAcetam  IVPB 500 milliGRAM(s) IV Intermittent every 12 hours    Respiratory: No Acute Dx, monitor for respiratory ditress, pulse oxymetry, Incentive spirometer, O2NC PRN, maintain Sat >90%  RR: 20 (06-04-19 @ 16:01) (20 - 20)  SpO2: 96% (06-04-19 @ 16:01) (96% - 96%)    Cardiovascular: No Acute Dx, Keep Normotensive,   - Hx of HTN, HLD takes atenolol, amlodipine, rosuvastatin at home    HR: 91 (06-04-19 @ 16:01) (91 - 91)  BP: 171/90 (06-04-19 @ 16:01) (171/90 - 171/90)    ECHO: Pending  EKG:  (06-04-19 @ 17:17)    Gastrointestinal/Nutrition: No Acute Dx, NPO, LR @100, GI Prophylaxis w/ Protonix 40mg Daily, Bowel regimen:, senna,    GastrointestinalMedications  pantoprazole    Tablet 40 milliGRAM(s) Oral before breakfast    Renal/Genitourinary: Urinary Retention, Bladder scan w/ 600cc, de la fuente placed. Strict I&O's, Replete electrolytes PRN.  - Hx of BPH takes flomax at home restarted.     BUN/Creat: 16/0.9 (06-04-19 @ 16:40)  Na:141 (06-04-19 @ 16:40)  K:4.3 (06-04-19 @ 16:40)     Hematologic: No Acute Dx, Monitor Hb, SCDs,, Hold Chemoprophylaxis    Hb:14.1 (06-04-19 @ 16:40)  Plt:181 (06-04-19 @ 16:40)  INR:1.11 (06-04-19 @ 16:40)  PTT:33.8 (06-04-19 @ 16:40)  Lactate (Blood):3.9 (06-04-19 @ 16:40)    Infectious Disease: No Acute Dx. Afebrile, WBC 14, Monitor for fevers, leukocytosis  T(F): 97.3 (06-04-19 @ 16:01), Max: 97.3 (06-04-19 @ 16:01)    Endocrine: No Acute Dx. Monitor FS q6h.  - Hx of HLD restarted on atorvastatin     Glucose:  166 (06-04-19 @ 16:18)    Lines/Tubes: PIV.    Disposition: Continue ICU care

## 2019-06-05 NOTE — PATIENT PROFILE ADULT - ABILITY TO HEAR (WITH HEARING AID OR HEARING APPLIANCE IF NORMALLY USED):
Mildly to Moderately Impaired: difficulty hearing in some environments or speaker may need to increase volume or speak distinctly/hearing aid at bedside

## 2019-06-05 NOTE — PROGRESS NOTE ADULT - SUBJECTIVE AND OBJECTIVE BOX
GENERAL SURGERY PROGRESS NOTE     PAMELAASCENCION CHAVIS  84y  Male  Hospital day :1d  POD:  Procedure:   OVERNIGHT EVENTS:  repeat head CT no change.  Waxing and waning LOC overnight.  This am lucid.      T(F): 97 (19 @ 08:00), Max: 99 (19 @ 02:45)  HR: 80 (19 @ 09:00) (80 - 92)  BP: 154/79 (19 @ 09:00) (152/85 - 171/90)  RR: 23 (19 @ 09:00) (17 - 31)  SpO2: 98% (19 @ 09:00) (96% - 99%)    DIET/FLUIDS:     URINE:   19 @ 07:01  -  19 @ 07:00  --------------------------------------------------------  OUT: 685 mL     Indwelling Urethral Catheter:     Connect To:  Straight Drainage/Rison    Indication:  Urinary Retention / Obstruction (19 @ 04:34)    GI proph:  pantoprazole    Tablet 40 milliGRAM(s) Oral before breakfast    PHYSICAL EXAM:  GENERAL: NAD, occipital hematoma present.  CHEST/LUNG: Clear to auscultation bilaterally  HEART: Regular rate and rhythm  ABDOMEN: Soft, Nontender, Nondistended;   EXTREMITIES:  No clubbing, cyanosis, or edema      LABS  Labs:  CAPILLARY BLOOD GLUCOSE      POCT Blood Glucose.: 166 mg/dL (2019 16:18)                          13.4   15.23 )-----------( 171      ( 2019 04:50 )             40.0       Auto Immature Granulocyte %: 0.4 % (19 @ 16:40)  Auto Neutrophil %: 89.6 % (19 @ 16:40)        140  |  101  |  15  ----------------------------<  149<H>  3.7   |  22  |  0.9      Calcium, Total Serum: 8.6 mg/dL (19 @ 04:50)      LFTs:             6.7  | 0.9  | 32       ------------------[36      ( 2019 16:40 )  4.0  | 0.2  | 33          Lipase:19          Blood Gas Venous - Lactate: 4.5 mmoL/L (19 @ 21:30)  Lactate, Blood: 3.9 mmol/L (19 @ 16:40)      Coags:     12.80  ----< 1.11    ( 2019 16:40 )     33.8        CARDIAC MARKERS ( 2019 17:42 )  x     / x     / 221 U/L / x     / x        Alcohol, Blood: <10 mg/dL (19 @ 16:40)    Urinalysis Basic - ( 2019 18:55 )    Color: Yellow / Appearance: Clear / S.020 / pH: x  Gluc: x / Ketone: 40  / Bili: Negative / Urobili: 0.2 mg/dL   Blood: x / Protein: 30 mg/dL / Nitrite: Negative   Leuk Esterase: Negative / RBC: x / WBC x   Sq Epi: x / Non Sq Epi: Occasional /HPF / Bacteria: Few /HPF      RADIOLOGY & ADDITIONAL TESTS:  < from: CT Head No Cont (19 @ 02:21) >  Findings/  IMPRESSION:    1.  Subdural hemorrhage over the right frontal and temporal convexities   and left cerebellopontine angle cistern, not significantly changed since   the prior study.    2.  Stable right frontal, right temporal and left cerebellar contusions.     3.  No midline shift. Stable ventricular size.    4.  Scattered subarachnoid hemorrhage and small intraventricular   hemorrhage are again noted.     5.  Redemonstration of large left parietal/occipital extracalvarial   hematoma and soft tissue swelling with slight widening of the left   lambdoid suture.    6.  Visualized paranasal sinuses and bilateral mastoid air cells are   well-aerated.    < end of copied text >      A/P

## 2019-06-05 NOTE — H&P ADULT - ASSESSMENT
ASSESSMENT:  84y old male, BIBEMS, s/p fall, unwitnessed, syncope? SDH right frontotemporal, scattered SAH, small layering interventricular hemorrhage, Hemorrhagic contusion of Right frontal, temporal lobes, left cerebellum, nondisplaced diastatic fracture.     PLAN:    ·	Admit to ICU  ·	Neuro checks: q1h   ·	Syncope workup w/ ECHO, EEG  ·	Seizure ppx: Keppra  ·	Pain regimen: Tylenol  ·	Monitor for changes in mental status  ·	Repeat CT Brain in 6 hrs ASSESSMENT:  84y old male, BIBEMS, s/p fall, unwitnessed, syncope? SDH right frontotemporal, scattered SAH, small layering interventricular hemorrhage, Hemorrhagic contusion of Right frontal, temporal lobes, left cerebellum, nondisplaced diastatic fracture.     PLAN:    ·	Admit to ICU  ·	NSX consultation  ·	Neuro checks: q1h   ·	Syncope workup w/ ECHO, EEG, Carotid duplex  ·	Seizure ppx: Keppra  ·	Pain regimen: Tylenol  ·	Monitor for changes in mental status  ·	Repeat CT Brain in 6 hrs  ·	Needs XR elbow, forearm, humerus, knee, tib/fib, femur BL ASSESSMENT:  84y old male, BIBEMS, s/p fall, unwitnessed, syncope? SDH right frontotemporal, scattered SAH, small layering interventricular hemorrhage, Hemorrhagic contusion of Right frontal, temporal lobes, left cerebellum, nondisplaced diastatic fracture.     PLAN:    ·	Admit to ICU  ·	NSX consultation  ·	Neuro checks: q1h   ·	Syncope workup w/ ECHO, EEG, Carotid duplex  ·	Seizure ppx: Keppra  ·	Pain regimen: Tylenol  ·	Monitor for changes in mental status  ·	Repeat CT Brain in 6 hrs  ·	Needs XR elbow, forearm, humerus, knee, tib/fib, femur BL    Above plan discussed with Dr. Monzon, ED, patient, and family at bedside

## 2019-06-05 NOTE — H&P ADULT - NSHPREVIEWOFSYSTEMS_GEN_ALL_CORE
ROS: 10-system review is otherwise negative except HPI above.      Primary Survey:    A - airway intact  B - bilateral breath sounds and good chest rise  C - palpable pulses in all extremities  D - GCS 15 on arrival, GIO  Exposure obtained

## 2019-06-05 NOTE — PROGRESS NOTE ADULT - ASSESSMENT
84y old male s/p fall with SDH right frontotemporal, scattered SAH, small layering interventricular hemorrhage, hemorrhagic contusion of Right frontal, temporal lobes, left cerebellum, nondisplaced diastatic fracture.     PLAN:  Continue ICU  neuro checks  keppra

## 2019-06-05 NOTE — H&P ADULT - NSHPLABSRESULTS_GEN_ALL_CORE
Labs:  CAPILLARY BLOOD GLUCOSE    POCT Blood Glucose.: 166 mg/dL (2019 16:18)                          14.1   14.04 )-----------( 181      ( 2019 16:40 )             43.0       Auto Immature Granulocyte %: 0.4 % (19 @ 16:40)  Auto Neutrophil %: 89.6 % (19 @ 16:40)        141  |  102  |  16  ----------------------------<  187<H>  4.3   |  21  |  0.9    Calcium, Total Serum: 9.1 mg/dL (19 @ 16:40)    LFTs:             6.7  | 0.9  | 32       ------------------[36      ( 2019 16:40 )  4.0  | 0.2  | 33          Lipase:19     Amylase:x         Blood Gas Venous - Lactate: 4.5 mmoL/L (19 @ 21:30)  Lactate, Blood: 3.9 mmol/L (19 @ 16:40)      Coags:     12.80  ----< 1.11    ( 2019 16:40 )     33.8        CARDIAC MARKERS ( 2019 17:42 )  x     / x     / 221 U/L / x     / x        Alcohol, Blood: <10 mg/dL (19 @ 16:40)    Urinalysis Basic - ( 2019 18:55 )    Color: Yellow / Appearance: Clear / S.020 / pH: x  Gluc: x / Ketone: 40  / Bili: Negative / Urobili: 0.2 mg/dL   Blood: x / Protein: 30 mg/dL / Nitrite: Negative   Leuk Esterase: Negative / RBC: x / WBC x   Sq Epi: x / Non Sq Epi: Occasional /HPF / Bacteria: Few /HPF    RADIOLOGY & ADDITIONAL STUDIES:  ---------------------------------------------------------------------------------------  < from: CT Abdomen and Pelvis w/ IV Cont (19 @ 20:23) >    IMPRESSION:    No CT evidence for acute traumatic injury to the chest, abdomen or pelvis.      < end of copied text >    < from: CT Cervical Spine No Cont (19 @ 20:19) >      IMPRESSION: No acute cervical spine pathology    < end of copied text >    < from: CT Head No Cont (19 @ 20:18) >    IMPRESSION:    1.  Large left parietal/occipital extracalvarial hematoma. Slight   widening of the left lambdoid suture suspicious for nondisplaced   diastatic fracture.    2.  Subdural hemorrhage in the right frontotemporal convexity measuring   up to 8 mm in width and within the left cerebellopontine angle cistern   measuring 8 mm in width.    3.  Hemorrhagic contusions in the right frontal and temporal lobes and   left cerebellum.      4.  Scattered subarachnoid hemorrhage and small layering intraventricular   hemorrhage.    < end of copied text >    < from: Xray Pelvis AP only (19 @ 16:41) >    Findings/  impression: No acutely displaced fracture. Moderate bilateral hip   degenerative change. Lower lumbar degenerative changes present. Vascular   calcifications noted.

## 2019-06-05 NOTE — CHART NOTE - NSCHARTNOTEFT_GEN_A_CORE
Patient had change in mental status, became agitated. Patient required dose of haldol for repeat CTH. Neurosurgery was contacted @ 02:20 regarding the repeat CTH.

## 2019-06-05 NOTE — PROGRESS NOTE ADULT - SUBJECTIVE AND OBJECTIVE BOX
ASCENCION LANGLEY  [id number]  84y Male    Indication for ICU admission: q1h Neurochecks for SDH/SAH s/p fall  Admit Date:19  ICU Date: 19    Allergies  No Known Allergies    PAST MEDICAL & SURGICAL HISTORY:  HTN, HLD, BPH, Glaucoma, dementia?    HOME MEDICATIONS:   AMLODIPINE   TAB 2.5MG  ATENOLOL     TAB 25MG  FLUOROMETHOL ELLA 0.1% OP  MEMANTINE    TAB HCL 10MG  ROSUVASTATIN TAB 5MG  TAMSULOSIN   CAP 0.4MG  TIMOLOL MAL  SOL 0.5% OP    24HRS EVENT:  84M w/ PMHx of HTN, HLD, BPH, Glaucoma s/p fall at home, unwitnessed, lives alone. +HT, unknown LOC, -AC. Trauma work up with SDH right frontotemporal, scattered SAH, small layering interventricular hemorrhage, Hemorrhagic contusion of Right frontal, temporal lobes, left cerebellum, nondisplaced diastatic fracture.    Neurologic: s/p fall, unwitnessed, syncope? SDH right frontotemporal, scattered SAH, small layering interventricular hemorrhage, Hemorrhagic contusion of Right frontal, temporal lobes, left cerebellum, nondisplaced diastatic fracture. GCS 15, AAO x 3, Monitor for changes in mental status, Pain regimen: Tylenol.  - Neuro checks: q1h  , Seizure ppx: Keppra.   - Significant imaging: CTH: SDH right frontotemporal, scattered SAH, small layering interventricular hemorrhage, Hemorrhagic contusion of Right frontal, temporal lobes, left cerebellum, nondisplaced diastatic fracture.   - Syncope workup w/ ECHO, EEG.   - Hx of dementia? takes memantine at home    Respiratory: No Acute Dx, monitor for respiratory ditress, pulse oxymetry, Incentive spirometer, O2NC PRN, maintain Sat >90%    Cardiovascular: No Acute Dx, Keep Normotensive,   - Hx of HTN, HLD takes atenolol, amlodipine, rosuvastatin at home    Gastrointestinal/Nutrition: No Acute Dx, NPO, LR @100, GI Prophylaxis w/ Protonix 40mg Daily, Bowel regimen:, senna,    Renal/Genitourinary: No Acute Dx, Freely Voiding. Strict I&O's, Replete electrolytes PRN.  - Hx of BPH takes flomax at home restarted.     Hematologic: No Acute Dx, Monitor Hb, SCDs,, Hold Chemoprophylaxis    Infectious Disease: No Acute Dx. Afebrile, WBC 14, Monitor for fevers, leukocytosis    Endocrine: No Acute Dx. Monitor FS q6h.  - Hx of HLD restarted on atorvastatin    DVT Prophylaxis: SCD's,  GI Prophylaxis: Protonix 40mg Daily     *** Tubes/Lines/Drains  ***  [x] Peripheral IV    REVIEW OF SYSTEMS    [x] A ten-point review of systems was otherwise negative except as noted.  [ ] Due to altered mental status/intubation, subjective information were not able to be obtained from the patient. History was obtained, to the extent possible, from review of the chart and collateral sources of information.    Daily Height in cm: 172.72 (2019 16:01)    Daily     Diet, NPO:   Except Medications     Special Instructions for Nursing:  Except Medications (19 @ 23:09)      CURRENT MEDS:  Neurologic Medications  acetaminophen   Tablet .. 650 milliGRAM(s) Oral every 6 hours  levETIRAcetam  IVPB 500 milliGRAM(s) IV Intermittent every 12 hours  memantine 10 milliGRAM(s) Oral daily    Respiratory Medications    Cardiovascular Medications  amLODIPine   Tablet 2.5 milliGRAM(s) Oral once  ATENolol  Tablet 25 milliGRAM(s) Oral daily  tamsulosin 0.4 milliGRAM(s) Oral at bedtime    Gastrointestinal Medications  lactated ringers. 1000 milliLiter(s) IV Continuous <Continuous>  pantoprazole    Tablet 40 milliGRAM(s) Oral before breakfast    Genitourinary Medications    Hematologic/Oncologic Medications    Antimicrobial/Immunologic Medications    Endocrine/Metabolic Medications  atorvastatin 20 milliGRAM(s) Oral at bedtime    Topical/Other Medications  chlorhexidine 4% Liquid 1 Application(s) Topical <User Schedule>  fluorometholone 0.1% Suspension 1 Drop(s) Both EYES daily  timolol 0.5% Solution 1 Drop(s) Both EYES daily      ICU Vital Signs Last 24 Hrs  T(C): 36.9 (2019 00:00), Max: 36.9 (2019 00:00)  T(F): 98.5 (2019 00:00), Max: 98.5 (2019 00:00)  HR: 88 (2019 01:00) (87 - 91)  BP: 161/85 (2019 01:00) (153/73 - 171/90)  BP(mean): 117 (2019 01:00) (117 - 117)  ABP: --  ABP(mean): --  RR: 20 (2019 01:00) (20 - 20)  SpO2: 96% (2019 01:00) (96% - 96%)      Adult Advanced Hemodynamics Last 24 Hrs  CVP(mm Hg): --  CVP(cm H2O): --  CO: --  CI: --  PA: --  PA(mean): --  PCWP: --  SVR: --  SVRI: --  PVR: --  PVRI: --          I&O's Summary    I&O's Detail      EXAM:  General/Neuro  GCS: 15  Exam: Normal, NAD, alert, oriented x 3, no focal deficits. PERRLA, Abrasion w/ associated left posterior scalp hematoma.  Face symmetrical tongue is midline speech is clear and fluent  Motor: 5/5 UE/LE all muscle groups     Respiratory  Exam: Lungs clear to auscultation, Normal expansion/effort.     Cardiovascular  Exam: S1, S2.  Regular rate and rhythm. No Peripheral edema     GI  Exam: Abdomen soft, Non-tender, Non-distended.  Current Diet:  NPO    Extremities  Exam: Extremities warm, pink, well-perfused.      :   Exam: Freely voiding    LABS:  CAPILLARY BLOOD GLUCOSE  POCT Blood Glucose.: 166 mg/dL (2019 16:18)               14.1   14.04 )-----------( 181      ( 2019 16:40 )             43.0       06-04    141  |  102  |  16  ----------------------------<  187<H>  4.3   |  21  |  0.9    Ca    9.1      2019 16:40    TPro  6.7  /  Alb  4.0  /  TBili  0.9  /  DBili  0.2  /  AST  32  /  ALT  33  /  AlkPhos  36  06-04      PT/INR - ( 2019 16:40 )   PT: 12.80 sec;   INR: 1.11 ratio       PTT - ( 2019 16:40 )  PTT:33.8 sec  CARDIAC MARKERS ( 2019 17:42 )  x     / x     / 221 U/L / x     / x        Urinalysis Basic - ( 2019 18:55 )    Color: Yellow / Appearance: Clear / S.020 / pH: x  Gluc: x / Ketone: 40  / Bili: Negative / Urobili: 0.2 mg/dL   Blood: x / Protein: 30 mg/dL / Nitrite: Negative   Leuk Esterase: Negative / RBC: x / WBC x   Sq Epi: x / Non Sq Epi: Occasional /HPF / Bacteria: Few /HPF    IMAGING RESULTS  CT Head No Cont (19 @ 20:18) >    IMPRESSION:    1.  Large left parietal/occipital extracalvarial hematoma. Slight   widening of the left lambdoid suture suspicious for nondisplaced   diastatic fracture.    2.  Subdural hemorrhage in the right frontotemporal convexity measuring   up to 8 mm in width and within the left cerebellopontine angle cistern   measuring 8 mm in width.    3.  Hemorrhagic contusions in the right frontal and temporal lobes and   left cerebellum.      4.  Scattered subarachnoid hemorrhage and small layering intraventricular   hemorrhage.    CT Cervical Spine No Cont (19 @ 20:19) >  IMPRESSION: No acute cervical spine pathology    CT Chest w/ IV Cont (19 @ 20:22) >  IMPRESSION:    No CT evidence for acute traumatic injury to the chest, abdomen or pelvis.    CT Abdomen and Pelvis w/ IV Cont (19 @ 20:23) >  IMPRESSION:    No CT evidence for acute traumatic injury to the chest, abdomen or pelvis. ASCENCION LANGLEY  [id number]  84y Male    Indication for ICU admission: q1h Neurochecks for SDH/SAH s/p fall  Admit Date:19  ICU Date: 19    Allergies  No Known Allergies    PAST MEDICAL & SURGICAL HISTORY:  HTN, HLD, BPH, Glaucoma, dementia?    HOME MEDICATIONS:   AMLODIPINE   TAB 2.5MG  ATENOLOL     TAB 25MG  FLUOROMETHOL ELLA 0.1% OP  MEMANTINE    TAB HCL 10MG  ROSUVASTATIN TAB 5MG  TAMSULOSIN   CAP 0.4MG  TIMOLOL MAL  SOL 0.5% OP    24HRS EVENT:  84M w/ PMHx of HTN, HLD, BPH, Glaucoma s/p fall at home, unwitnessed, lives alone. +HT, unknown LOC, -AC. Trauma work up with SDH right frontotemporal, scattered SAH, small layering interventricular hemorrhage, Hemorrhagic contusion of Right frontal, temporal lobes, left cerebellum, nondisplaced diastatic fracture.    Neurologic: s/p fall, unwitnessed, syncope? SDH right frontotemporal, scattered SAH, small layering interventricular hemorrhage, Hemorrhagic contusion of Right frontal, temporal lobes, left cerebellum, nondisplaced diastatic fracture. GCS 15, AAO x 3, Monitor for changes in mental status, Pain regimen: Tylenol.  - Neuro checks: q1h  , Seizure ppx: Keppra.   - Significant imaging: CTH: SDH right frontotemporal, scattered SAH, small layering interventricular hemorrhage, Hemorrhagic contusion of Right frontal, temporal lobes, left cerebellum, nondisplaced diastatic fracture.   - Syncope workup w/ ECHO, EEG.   - Hx of dementia? takes memantine at home    Respiratory: No Acute Dx, monitor for respiratory ditress, pulse oxymetry, Incentive spirometer, O2NC PRN, maintain Sat >90%    Cardiovascular: No Acute Dx, Keep Normotensive,   - Hx of HTN, HLD takes atenolol, amlodipine, rosuvastatin at home    Gastrointestinal/Nutrition: No Acute Dx, NPO, LR @100, GI Prophylaxis w/ Protonix 40mg Daily, Bowel regimen:, senna,    Renal/Genitourinary: Urinary Retention, Bladder scan w/ 600cc, de la fuente placed. Strict I&O's, Replete electrolytes PRN.  - Hx of BPH takes flomax at home restarted.     Hematologic: No Acute Dx, Monitor Hb, SCDs,, Hold Chemoprophylaxis    Infectious Disease: No Acute Dx. Afebrile, WBC 14, Monitor for fevers, leukocytosis    Endocrine: No Acute Dx. Monitor FS q6h.  - Hx of HLD restarted on atorvastatin    DVT Prophylaxis: SCD's,  GI Prophylaxis: Protonix 40mg Daily     *** Tubes/Lines/Drains  ***  [x] Peripheral IV    REVIEW OF SYSTEMS    [x] A ten-point review of systems was otherwise negative except as noted.  [ ] Due to altered mental status/intubation, subjective information were not able to be obtained from the patient. History was obtained, to the extent possible, from review of the chart and collateral sources of information.    Daily Height in cm: 172.72 (2019 16:01)    Daily     Diet, NPO:   Except Medications     Special Instructions for Nursing:  Except Medications (19 @ 23:09)      CURRENT MEDS:  Neurologic Medications  acetaminophen   Tablet .. 650 milliGRAM(s) Oral every 6 hours  levETIRAcetam  IVPB 500 milliGRAM(s) IV Intermittent every 12 hours  memantine 10 milliGRAM(s) Oral daily    Respiratory Medications    Cardiovascular Medications  amLODIPine   Tablet 2.5 milliGRAM(s) Oral once  ATENolol  Tablet 25 milliGRAM(s) Oral daily  tamsulosin 0.4 milliGRAM(s) Oral at bedtime    Gastrointestinal Medications  lactated ringers. 1000 milliLiter(s) IV Continuous <Continuous>  pantoprazole    Tablet 40 milliGRAM(s) Oral before breakfast    Genitourinary Medications    Hematologic/Oncologic Medications    Antimicrobial/Immunologic Medications    Endocrine/Metabolic Medications  atorvastatin 20 milliGRAM(s) Oral at bedtime    Topical/Other Medications  chlorhexidine 4% Liquid 1 Application(s) Topical <User Schedule>  fluorometholone 0.1% Suspension 1 Drop(s) Both EYES daily  timolol 0.5% Solution 1 Drop(s) Both EYES daily      ICU Vital Signs Last 24 Hrs  T(C): 36.9 (2019 00:00), Max: 36.9 (2019 00:00)  T(F): 98.5 (2019 00:00), Max: 98.5 (2019 00:00)  HR: 88 (2019 01:00) (87 - 91)  BP: 161/85 (2019 01:00) (153/73 - 171/90)  BP(mean): 117 (2019 01:00) (117 - 117)  ABP: --  ABP(mean): --  RR: 20 (2019 01:00) (20 - 20)  SpO2: 96% (2019 01:00) (96% - 96%)      Adult Advanced Hemodynamics Last 24 Hrs  CVP(mm Hg): --  CVP(cm H2O): --  CO: --  CI: --  PA: --  PA(mean): --  PCWP: --  SVR: --  SVRI: --  PVR: --  PVRI: --          I&O's Summary    I&O's Detail      EXAM:  General/Neuro  GCS: 15  Exam: Normal, NAD, alert, oriented x 3, no focal deficits. PERRLA, Abrasion w/ associated left posterior scalp hematoma.  Face symmetrical tongue is midline speech is clear and fluent  Motor: 5/5 UE/LE all muscle groups     Respiratory  Exam: Lungs clear to auscultation, Normal expansion/effort.     Cardiovascular  Exam: S1, S2.  Regular rate and rhythm. No Peripheral edema     GI  Exam: Abdomen soft, Non-tender, Non-distended.  Current Diet:  NPO    Extremities  Exam: Extremities warm, pink, well-perfused.      :   Exam: Freely voiding    LABS:  CAPILLARY BLOOD GLUCOSE  POCT Blood Glucose.: 166 mg/dL (2019 16:18)               14.1   14.04 )-----------( 181      ( 2019 16:40 )             43.0       06-04    141  |  102  |  16  ----------------------------<  187<H>  4.3   |  21  |  0.9    Ca    9.1      2019 16:40    TPro  6.7  /  Alb  4.0  /  TBili  0.9  /  DBili  0.2  /  AST  32  /  ALT  33  /  AlkPhos  36  06-04      PT/INR - ( 2019 16:40 )   PT: 12.80 sec;   INR: 1.11 ratio       PTT - ( 2019 16:40 )  PTT:33.8 sec  CARDIAC MARKERS ( 2019 17:42 )  x     / x     / 221 U/L / x     / x        Urinalysis Basic - ( 2019 18:55 )    Color: Yellow / Appearance: Clear / S.020 / pH: x  Gluc: x / Ketone: 40  / Bili: Negative / Urobili: 0.2 mg/dL   Blood: x / Protein: 30 mg/dL / Nitrite: Negative   Leuk Esterase: Negative / RBC: x / WBC x   Sq Epi: x / Non Sq Epi: Occasional /HPF / Bacteria: Few /HPF    IMAGING RESULTS  CT Head No Cont (19 @ 20:18) >    IMPRESSION:    1.  Large left parietal/occipital extracalvarial hematoma. Slight   widening of the left lambdoid suture suspicious for nondisplaced   diastatic fracture.    2.  Subdural hemorrhage in the right frontotemporal convexity measuring   up to 8 mm in width and within the left cerebellopontine angle cistern   measuring 8 mm in width.    3.  Hemorrhagic contusions in the right frontal and temporal lobes and   left cerebellum.      4.  Scattered subarachnoid hemorrhage and small layering intraventricular   hemorrhage.    CT Cervical Spine No Cont (19 @ 20:19) >  IMPRESSION: No acute cervical spine pathology    CT Chest w/ IV Cont (19 @ 20:22) >  IMPRESSION:    No CT evidence for acute traumatic injury to the chest, abdomen or pelvis.    CT Abdomen and Pelvis w/ IV Cont (19 @ 20:23) >  IMPRESSION:    No CT evidence for acute traumatic injury to the chest, abdomen or pelvis.

## 2019-06-05 NOTE — H&P ADULT - HISTORY OF PRESENT ILLNESS
84y m  TRAUMA ACTIVATION LEVEL:  Trauma alert    MECHANISM OF INJURY: s/p unwitnessed fall (+HT, ?LOC, -AC)     [] Blunt               [] MVC	                       [X] Fall	                 [] Pedestrian Struck	     [] Motorcycle    [] Assault                       [] Bicycle collision  [] Sports injury     [] Penetrating    [] Gun Shot Wound    [] Stab Wound    GCS: 	15  E: 4	V: 5	M: 6    HPI:   84y old male, BIBEMS, s/p unwitnessed fall (+HT, ?LOC, -AC); patient states he fell in his house, in his kitchen. No LOC. Noted by daughter to have bruising to head. Daughter called her father this morning states that he was acting strange. He said he was tired and went back to sleep. This was at 10am and very unusual for the pt as per daughter because pt rises early in morning and does not oversleep. Daughter then drove from [place] to her fathers home and noted pt has large contusion to his scalp. Daughter then brought pt to the ED for evaluation No nausea, no vomiting. No seizure activity. No blood thinners. No recent illness, no fever, no chest pain or shortness of breath. No abdominal pain. 84y m  TRAUMA ACTIVATION LEVEL:  Trauma alert    MECHANISM OF INJURY: s/p unwitnessed fall (+HT, ?LOC, -AC)     [] Blunt               [] MVC	                       [X] Fall	                 [] Pedestrian Struck	     [] Motorcycle    [] Assault                       [] Bicycle collision  [] Sports injury     [] Penetrating    [] Gun Shot Wound    [] Stab Wound    GCS: 	15  E: 4	V: 5	M: 6    HPI:   84y old male, BIBEMS, s/p unwitnessed fall (+HT, ?LOC, -AC); patient states he fell in his house, in his kitchen. No LOC. Noted by daughter to have bruising to head. Daughter called her father this morning states that he was acting strange. He said he was tired and went back to sleep. This was at 10am and very unusual for the pt as per daughter because pt rises early in morning and does not oversleep. Daughter then drove from [place] to her fathers home and noted pt has large contusion to his scalp. Daughter then brought pt to the ED for evaluation No nausea, no vomiting. No seizure activity. No blood thinners. No recent illness, no fever, no chest pain or shortness of breath. No abdominal pain.    PAST MEDICAL & SURGICAL HISTORY:  HTN, HLD, BPH, Glaucoma    Home Meds: Home Medications:  AMLODIPINE   TAB 2.5MG:  (04 Jun 2019 23:38)  ATENOLOL     TAB 25MG:  (04 Jun 2019 23:38)  FLUOROMETHOL ELLA 0.1% OP:  (04 Jun 2019 23:38)  MEMANTINE    TAB HCL 10MG:  (04 Jun 2019 23:38)  ROSUVASTATIN TAB 5MG:  (04 Jun 2019 23:38)  TAMSULOSIN   CAP 0.4MG:  (04 Jun 2019 23:38)  TIMOLOL MAL  SOL 0.5% OP:  (04 Jun 2019 23:38)    Allergies: AllergiesNo Known Allergies    Advanced Directives: Presumed Full Code

## 2019-06-06 LAB
ANION GAP SERPL CALC-SCNC: 14 MMOL/L — SIGNIFICANT CHANGE UP (ref 7–14)
BUN SERPL-MCNC: 22 MG/DL — HIGH (ref 10–20)
CALCIUM SERPL-MCNC: 8.7 MG/DL — SIGNIFICANT CHANGE UP (ref 8.5–10.1)
CHLORIDE SERPL-SCNC: 101 MMOL/L — SIGNIFICANT CHANGE UP (ref 98–110)
CO2 SERPL-SCNC: 23 MMOL/L — SIGNIFICANT CHANGE UP (ref 17–32)
CREAT SERPL-MCNC: 1 MG/DL — SIGNIFICANT CHANGE UP (ref 0.7–1.5)
GLUCOSE SERPL-MCNC: 122 MG/DL — HIGH (ref 70–99)
HCT VFR BLD CALC: 41.3 % — LOW (ref 42–52)
HGB BLD-MCNC: 13.6 G/DL — LOW (ref 14–18)
MAGNESIUM SERPL-MCNC: 2.1 MG/DL — SIGNIFICANT CHANGE UP (ref 1.8–2.4)
MCHC RBC-ENTMCNC: 29.1 PG — SIGNIFICANT CHANGE UP (ref 27–31)
MCHC RBC-ENTMCNC: 32.9 G/DL — SIGNIFICANT CHANGE UP (ref 32–37)
MCV RBC AUTO: 88.2 FL — SIGNIFICANT CHANGE UP (ref 80–94)
NRBC # BLD: 0 /100 WBCS — SIGNIFICANT CHANGE UP (ref 0–0)
PHOSPHATE SERPL-MCNC: 2.5 MG/DL — SIGNIFICANT CHANGE UP (ref 2.1–4.9)
PLATELET # BLD AUTO: 159 K/UL — SIGNIFICANT CHANGE UP (ref 130–400)
POTASSIUM SERPL-MCNC: 3.9 MMOL/L — SIGNIFICANT CHANGE UP (ref 3.5–5)
POTASSIUM SERPL-SCNC: 3.9 MMOL/L — SIGNIFICANT CHANGE UP (ref 3.5–5)
RBC # BLD: 4.68 M/UL — LOW (ref 4.7–6.1)
RBC # FLD: 13.9 % — SIGNIFICANT CHANGE UP (ref 11.5–14.5)
SODIUM SERPL-SCNC: 138 MMOL/L — SIGNIFICANT CHANGE UP (ref 135–146)
WBC # BLD: 14.16 K/UL — HIGH (ref 4.8–10.8)
WBC # FLD AUTO: 14.16 K/UL — HIGH (ref 4.8–10.8)

## 2019-06-06 PROCEDURE — 71045 X-RAY EXAM CHEST 1 VIEW: CPT | Mod: 26

## 2019-06-06 PROCEDURE — 93880 EXTRACRANIAL BILAT STUDY: CPT | Mod: 26

## 2019-06-06 PROCEDURE — 99233 SBSQ HOSP IP/OBS HIGH 50: CPT

## 2019-06-06 RX ORDER — AMLODIPINE BESYLATE 2.5 MG/1
2.5 TABLET ORAL DAILY
Refills: 0 | Status: DISCONTINUED | OUTPATIENT
Start: 2019-06-06 | End: 2019-06-11

## 2019-06-06 RX ADMIN — Medication 650 MILLIGRAM(S): at 06:13

## 2019-06-06 RX ADMIN — Medication 1 DROP(S): at 12:53

## 2019-06-06 RX ADMIN — HEPARIN SODIUM 5000 UNIT(S): 5000 INJECTION INTRAVENOUS; SUBCUTANEOUS at 06:12

## 2019-06-06 RX ADMIN — HEPARIN SODIUM 5000 UNIT(S): 5000 INJECTION INTRAVENOUS; SUBCUTANEOUS at 15:20

## 2019-06-06 RX ADMIN — HEPARIN SODIUM 5000 UNIT(S): 5000 INJECTION INTRAVENOUS; SUBCUTANEOUS at 22:38

## 2019-06-06 RX ADMIN — Medication 650 MILLIGRAM(S): at 12:52

## 2019-06-06 RX ADMIN — Medication 650 MILLIGRAM(S): at 18:05

## 2019-06-06 RX ADMIN — MEMANTINE HYDROCHLORIDE 10 MILLIGRAM(S): 10 TABLET ORAL at 12:52

## 2019-06-06 RX ADMIN — CHLORHEXIDINE GLUCONATE 1 APPLICATION(S): 213 SOLUTION TOPICAL at 06:13

## 2019-06-06 RX ADMIN — Medication 650 MILLIGRAM(S): at 23:58

## 2019-06-06 RX ADMIN — ATENOLOL 25 MILLIGRAM(S): 25 TABLET ORAL at 06:12

## 2019-06-06 RX ADMIN — LEVETIRACETAM 420 MILLIGRAM(S): 250 TABLET, FILM COATED ORAL at 06:12

## 2019-06-06 RX ADMIN — LEVETIRACETAM 420 MILLIGRAM(S): 250 TABLET, FILM COATED ORAL at 17:35

## 2019-06-06 RX ADMIN — FLUOROMETHOLONE 1 DROP(S): 1 SOLUTION/ DROPS OPHTHALMIC at 12:53

## 2019-06-06 RX ADMIN — Medication 650 MILLIGRAM(S): at 17:34

## 2019-06-06 RX ADMIN — PANTOPRAZOLE SODIUM 40 MILLIGRAM(S): 20 TABLET, DELAYED RELEASE ORAL at 06:12

## 2019-06-06 RX ADMIN — ATORVASTATIN CALCIUM 20 MILLIGRAM(S): 80 TABLET, FILM COATED ORAL at 22:37

## 2019-06-06 RX ADMIN — Medication 650 MILLIGRAM(S): at 06:12

## 2019-06-06 RX ADMIN — AMLODIPINE BESYLATE 2.5 MILLIGRAM(S): 2.5 TABLET ORAL at 21:01

## 2019-06-06 RX ADMIN — Medication 650 MILLIGRAM(S): at 13:15

## 2019-06-06 RX ADMIN — TAMSULOSIN HYDROCHLORIDE 0.4 MILLIGRAM(S): 0.4 CAPSULE ORAL at 22:37

## 2019-06-06 NOTE — PROGRESS NOTE ADULT - SUBJECTIVE AND OBJECTIVE BOX
ASCENCION LANGLEY  [id number]  84y Male    Indication for ICU admission: q1h Neurochecks for SDH/SAH s/p fall  Admit Date:06-04-19  ICU Date: 06-04-19    Allergies  No Known Allergies    PAST MEDICAL & SURGICAL HISTORY:  HTN, HLD, BPH, Glaucoma, dementia?    HOME MEDICATIONS:   AMLODIPINE   TAB 2.5MG  ATENOLOL     TAB 25MG  FLUOROMETHOL ELLA 0.1% OP  MEMANTINE    TAB HCL 10MG  ROSUVASTATIN TAB 5MG  TAMSULOSIN   CAP 0.4MG  TIMOLOL MAL  SOL 0.5% OP    24HRS EVENT:  84M w/ PMHx of HTN, HLD, BPH, Glaucoma s/p fall at home, unwitnessed, lives alone. +HT, unknown LOC, -AC. Trauma work up with SDH right frontotemporal, scattered SAH, small layering interventricular hemorrhage, Hemorrhagic contusion of Right frontal, temporal lobes, left cerebellum, nondisplaced diastatic fracture.    Neurologic: s/p fall, unwitnessed, syncope? SDH right frontotemporal, scattered SAH, small layering interventricular hemorrhage, Hemorrhagic contusion of Right frontal, temporal lobes, left cerebellum, nondisplaced diastatic fracture. GCS 15, AAO x 3, Monitor for changes in mental status, Pain regimen: Tylenol.  - Neuro checks: q1h  , Seizure ppx: Keppra.   - Significant imaging: CTH: SDH right frontotemporal, scattered SAH, small layering interventricular hemorrhage, Hemorrhagic contusion of Right frontal, temporal lobes, left cerebellum, nondisplaced diastatic fracture.   - Syncope workup w/ ECHO, EEG.   - Hx of dementia? takes memantine at home    Respiratory: No Acute Dx, monitor for respiratory ditress, pulse oxymetry, Incentive spirometer, O2NC PRN, maintain Sat >90%    Cardiovascular: No Acute Dx, Keep Normotensive,   - Hx of HTN, HLD takes atenolol, amlodipine, rosuvastatin at home    Gastrointestinal/Nutrition: No Acute Dx, NPO, LR @100, GI Prophylaxis w/ Protonix 40mg Daily, Bowel regimen:, senna,    Renal/Genitourinary: No Acute Dx, de la fuente in place. Strict I&O's, Replete electrolytes PRN.  - Hx of BPH takes flomax at home restarted.   - Light pink urine, please monitor     Hematologic: No Acute Dx, Monitor Hb, SCDs,, Hold Chemoprophylaxis as per neuro sx     Infectious Disease: No Acute Dx. Afebrile, WBC 14, Monitor for fevers, leukocytosis    Endocrine: No Acute Dx. Monitor FS q6h.  - Hx of HLD restarted on atorvastatin    DVT Prophylaxis: SCD's,  GI Prophylaxis: Protonix 40mg Daily     *** Tubes/Lines/Drains  ***  [x] Peripheral IV    REVIEW OF SYSTEMS    [x] A ten-point review of systems was otherwise negative except as noted.  [ ] Due to altered mental status/intubation, subjective information were not able to be obtained from the patient. History was obtained, to the extent possible, from review of the chart and collateral sources of information. ASCENCION LANGLEY  [id number]  84y Male    Indication for ICU admission: q1h Neurochecks for SDH/SAH s/p fall  Admit Date:19  ICU Date: 19    Allergies  No Known Allergies    PAST MEDICAL & SURGICAL HISTORY:  HTN, HLD, BPH, Glaucoma, dementia?    HOME MEDICATIONS:   AMLODIPINE   TAB 2.5MG  ATENOLOL     TAB 25MG  FLUOROMETHOL ELLA 0.1% OP  MEMANTINE    TAB HCL 10MG  ROSUVASTATIN TAB 5MG  TAMSULOSIN   CAP 0.4MG  TIMOLOL MAL  SOL 0.5% OP    24HRS EVENT:  84M w/ PMHx of HTN, HLD, BPH, Glaucoma s/p fall at home, unwitnessed, lives alone. +HT, unknown LOC, -AC. Trauma work up with SDH right frontotemporal, scattered SAH, small layering interventricular hemorrhage, Hemorrhagic contusion of Right frontal, temporal lobes, left cerebellum, nondisplaced diastatic fracture.    Neurologic: s/p fall, unwitnessed, syncope? SDH right frontotemporal, scattered SAH, small layering interventricular hemorrhage, Hemorrhagic contusion of Right frontal, temporal lobes, left cerebellum, nondisplaced diastatic fracture. GCS 15, AAO x 3, Monitor for changes in mental status, Pain regimen: Tylenol.  - Neuro checks: q1h  , Seizure ppx: Keppra.   - Significant imaging: CTH: SDH right frontotemporal, scattered SAH, small layering interventricular hemorrhage, Hemorrhagic contusion of Right frontal, temporal lobes, left cerebellum, nondisplaced diastatic fracture.   - Syncope workup w/ ECHO, EEG.   - Hx of dementia? takes memantine at home    Respiratory: No Acute Dx, monitor for respiratory ditress, pulse oxymetry, Incentive spirometer, O2NC PRN, maintain Sat >90%    Cardiovascular: No Acute Dx, Keep Normotensive,   - Hx of HTN, HLD takes atenolol, amlodipine, rosuvastatin at home    Gastrointestinal/Nutrition: No Acute Dx, NPO, LR @100, GI Prophylaxis w/ Protonix 40mg Daily, Bowel regimen:, senna,    Renal/Genitourinary: No Acute Dx, de la fuente in place. Strict I&O's, Replete electrolytes PRN.  - Hx of BPH takes flomax at home restarted.   - Light pink urine, please monitor     Hematologic: No Acute Dx, Monitor Hb, SCDs,, Hold Chemoprophylaxis as per neuro sx     Infectious Disease: No Acute Dx. Afebrile, WBC 14, Monitor for fevers, leukocytosis    Endocrine: No Acute Dx. Monitor FS q6h.  - Hx of HLD restarted on atorvastatin    DVT Prophylaxis: SCD's,  GI Prophylaxis: Protonix 40mg Daily     *** Tubes/Lines/Drains  ***  [x] Peripheral IV    REVIEW OF SYSTEMS    [x] A ten-point review of systems was otherwise negative except as noted.  [ ] Due to altered mental status/intubation, subjective information were not able to be obtained from the patient. History was obtained, to the extent possible, from review of the chart and collateral sources of information.      Daily     Daily     Diet, DASH/TLC:   Sodium & Cholesterol Restricted (19 @ 08:59)      CURRENT MEDS:  Neurologic Medications  acetaminophen   Tablet .. 650 milliGRAM(s) Oral every 6 hours  levETIRAcetam  IVPB 500 milliGRAM(s) IV Intermittent every 12 hours  memantine 10 milliGRAM(s) Oral daily    Respiratory Medications    Cardiovascular Medications  ATENolol  Tablet 25 milliGRAM(s) Oral daily  tamsulosin 0.4 milliGRAM(s) Oral at bedtime    Gastrointestinal Medications  pantoprazole    Tablet 40 milliGRAM(s) Oral before breakfast    Genitourinary Medications    Hematologic/Oncologic Medications  heparin  Injectable 5000 Unit(s) SubCutaneous every 8 hours    Antimicrobial/Immunologic Medications    Endocrine/Metabolic Medications  atorvastatin 20 milliGRAM(s) Oral at bedtime    Topical/Other Medications  chlorhexidine 4% Liquid 1 Application(s) Topical <User Schedule>  fluorometholone 0.1% Suspension 1 Drop(s) Both EYES daily  timolol 0.5% Solution 1 Drop(s) Both EYES daily      ICU Vital Signs Last 24 Hrs  T(C): 35.9 (2019 16:00), Max: 36.6 (2019 20:00)  T(F): 96.7 (2019 16:00), Max: 97.9 (2019 20:00)  HR: 80 (2019 17:00) (72 - 90)  BP: 162/93 (2019 17:00) (110/51 - 166/80)  BP(mean): 130 (2019 17:00) (64 - 130)  ABP: --  ABP(mean): --  RR: 22 (2019 17:00) (15 - 31)  SpO2: 96% (2019 17:00) (95% - 98%)      Adult Advanced Hemodynamics Last 24 Hrs  CVP(mm Hg): --  CVP(cm H2O): --  CO: --  CI: --  PA: --  PA(mean): --  PCWP: --  SVR: --  SVRI: --  PVR: --  PVRI: --          I&O's Summary    2019 07:  -  2019 07:00  --------------------------------------------------------  IN: 300 mL / OUT: 1850 mL / NET: -1550 mL    2019 07:  -  2019 18:10  --------------------------------------------------------  IN: 0 mL / OUT: 895 mL / NET: -895 mL      I&O's Detail    2019 07:  -  2019 07:00  --------------------------------------------------------  IN:    IV PiggyBack: 100 mL    lactated ringers.: 200 mL  Total IN: 300 mL    OUT:    Indwelling Catheter - Urethral: 1850 mL  Total OUT: 1850 mL    Total NET: -1550 mL      2019 07:  -  2019 18:10  --------------------------------------------------------  IN:  Total IN: 0 mL    OUT:    Indwelling Catheter - Urethral: 895 mL  Total OUT: 895 mL    Total NET: -895 mL      PHYSICAL EXAM:    General/Neuro  GCS 15, waxes and wanes throughout day      Lungs: RA    Cardiovascular : S1, S2.  Regular rate and rhythm. No peripheral edema     GI: Abdomen soft, Non-tender, Non-distended.      Extremities: Extremities warm, pink, well-perfused. b/l radial pulse palpable    Derm: Good skin turgor, no skin breakdown.      :       De La Fuente catheter in place due to retention      CXR:     < from: Xray Chest 1 View- PORTABLE-Routine (19 @ 05:45) >  Impression:      No radiographic evidence of acute cardiopulmonary disease.    < end of copied text >      LABS:  CAPILLARY BLOOD GLUCOSE                              13.6   14.16 )-----------( 159      ( 2019 00:15 )             41.3       PT  12.80 ()    INR  1.11 ()    PTT  33.8 ()      -    138  |  101  |  22<H>  ----------------------------<  122<H>  3.9   |  23  |  1.0    Ca    8.7      2019 00:15  Phos  2.5     -  Mg     2.1     -              Urinalysis Basic - ( 2019 18:55 )    Color: Yellow / Appearance: Clear / S.020 / pH: x  Gluc: x / Ketone: 40  / Bili: Negative / Urobili: 0.2 mg/dL   Blood: x / Protein: 30 mg/dL / Nitrite: Negative   Leuk Esterase: Negative / RBC: x / WBC x   Sq Epi: x / Non Sq Epi: Occasional /HPF / Bacteria: Few /HPF        Culture - Urine (collected 2019 18:55)  Source: .Urine Clean Catch (Midstream)  Final Report (2019 22:00):    No growth

## 2019-06-06 NOTE — PROGRESS NOTE ADULT - SUBJECTIVE AND OBJECTIVE BOX
GENERAL SURGERY PROGRESS NOTE     PAMELAASCENCION CHAVIS  84y  Male  Hospital day :2d  POD:  Procedure:   OVERNIGHT EVENTS:  stable, afebrile.      T(F): 97.6 (19 @ 00:00), Max: 98 (19 @ 16:00)  HR: 74 (19 @ 00:00) (72 - 88)  BP: 141/70 (19 @ 00:00) (110/51 - 168/84)  RR: 24 (19 @ 00:00) (17 - 31)  SpO2: 95% (19 @ 00:00) (95% - 100%)    DIET/FLUIDS:   URINE:   19 @ 07:01  -  19 @ 07:00  --------------------------------------------------------  OUT: 685 mL     Indwelling Urethral Catheter:     Connect To:  Straight Drainage/[place]    Indication:  Urinary Retention / Obstruction (19 @ 00:02)    GI proph:  pantoprazole    Tablet 40 milliGRAM(s) Oral before breakfast    AC/ proph: heparin  Injectable 5000 Unit(s) SubCutaneous every 8 hours    PHYSICAL EXAM:  GENERAL: NAD  CHEST/LUNG: Clear to auscultation bilaterally  HEART: Regular rate and rhythm  ABDOMEN: Soft, Nontender, Nondistended;   EXTREMITIES:  No clubbing, cyanosis, or edema      LABS  Labs:  CAPILLARY BLOOD GLUCOSE                              13.6   14.16 )-----------( 159      ( 2019 00:15 )             41.3             138  |  101  |  22<H>  ----------------------------<  122<H>  3.9   |  23  |  1.0      Calcium, Total Serum: 8.7 mg/dL (19 @ 00:15)      LFTs:             6.7  | 0.9  | 32       ------------------[36      ( 2019 16:40 )  4.0  | 0.2  | 33          Lipase:19        Blood Gas Venous - Lactate: 4.5 mmoL/L (19 @ 21:30)  Lactate, Blood: 3.9 mmol/L (19 @ 16:40)      Coags:     12.80  ----< 1.11    ( 2019 16:40 )     33.8        CARDIAC MARKERS ( 2019 17:42 )  x     / x     / 221 U/L / x     / x          Urinalysis Basic - ( 2019 18:55 )    Color: Yellow / Appearance: Clear / S.020 / pH: x  Gluc: x / Ketone: 40  / Bili: Negative / Urobili: 0.2 mg/dL   Blood: x / Protein: 30 mg/dL / Nitrite: Negative   Leuk Esterase: Negative / RBC: x / WBC x   Sq Epi: x / Non Sq Epi: Occasional /HPF / Bacteria: Few /HPF        Culture - Urine (collected 2019 18:55)  Source: .Urine Clean Catch (Midstream)  Final Report (2019 22:00):    No growth          RADIOLOGY & ADDITIONAL TESTS:  < from: CT Head No Cont (19 @ 02:21) >  Findings/  IMPRESSION:    1.  Subdural hemorrhage over the right frontal and temporal convexities   and left cerebellopontine angle cistern, not significantly changed since   the prior study.    2.  Stable right frontal, right temporal and left cerebellar contusions.     3.  No midline shift. Stable ventricular size.    4.  Scattered subarachnoid hemorrhage and small intraventricular   hemorrhage are again noted.     5.  Redemonstration of large left parietal/occipital extracalvarial   hematoma and soft tissue swelling with slight widening of the left   lambdoid suture.    6.  Visualized paranasal sinuses and bilateral mastoid air cells are   well-aerated.      < end of copied text >      A/P

## 2019-06-06 NOTE — PROGRESS NOTE ADULT - SUBJECTIVE AND OBJECTIVE BOX
Subjective: 84yMale with a pmhx of SUBDURAL HEMATOMA DUE TO CONCUSSION WITH LOC, SEQUELA;FALL  ^POSS STROKE LAST NIGHT  Yes  Handoff  MEWS Score  Subdural hematoma due to concussion, with loss of consciousness, sequela  Subdural hematoma due to concussion, with loss of consciousness, sequela  POSS STROKE LAST NIGHT  Subarachnoid hemorrhage, traumatic  Fall    s/p fall, not on anticoagulant, found tp0 havre right frontal SDH, b/l frontal contusions , scattered SAH  Allergies    No Known Allergies    Intolerances        Vital Signs Last 24 Hrs  T(C): 35.7 (06 Jun 2019 08:00), Max: 36.7 (05 Jun 2019 16:00)  T(F): 96.2 (06 Jun 2019 08:00), Max: 98 (05 Jun 2019 16:00)  HR: 80 (06 Jun 2019 08:00) (72 - 84)  BP: 146/77 (06 Jun 2019 08:00) (110/51 - 168/84)  BP(mean): 103 (06 Jun 2019 08:00) (64 - 129)  RR: 27 (06 Jun 2019 08:00) (17 - 31)  SpO2: 97% (06 Jun 2019 08:00) (95% - 100%)      acetaminophen   Tablet .. 650 milliGRAM(s) Oral every 6 hours  ATENolol  Tablet 25 milliGRAM(s) Oral daily  atorvastatin 20 milliGRAM(s) Oral at bedtime  chlorhexidine 4% Liquid 1 Application(s) Topical <User Schedule>  fluorometholone 0.1% Suspension 1 Drop(s) Both EYES daily  heparin  Injectable 5000 Unit(s) SubCutaneous every 8 hours  levETIRAcetam  IVPB 500 milliGRAM(s) IV Intermittent every 12 hours  memantine 10 milliGRAM(s) Oral daily  pantoprazole    Tablet 40 milliGRAM(s) Oral before breakfast  tamsulosin 0.4 milliGRAM(s) Oral at bedtime  timolol 0.5% Solution 1 Drop(s) Both EYES daily        06-05-19 @ 07:01  -  06-06-19 @ 07:00  --------------------------------------------------------  IN: 300 mL / OUT: 1850 mL / NET: -1550 mL    06-06-19 @ 07:01  -  06-06-19 @ 10:18  --------------------------------------------------------  IN: 0 mL / OUT: 40 mL / NET: -40 mL          Exam:  awake and alert, starting to eat breakfast  pupils equal and reactive  follows commands  MAEx4      CBC Full  -  ( 06 Jun 2019 00:15 )  WBC Count : 14.16 K/uL  RBC Count : 4.68 M/uL  Hemoglobin : 13.6 g/dL  Hematocrit : 41.3 %  Platelet Count - Automated : 159 K/uL  Mean Cell Volume : 88.2 fL  Mean Cell Hemoglobin : 29.1 pg  Mean Cell Hemoglobin Concentration : 32.9 g/dL    06-06    138  |  101  |  22<H>  ----------------------------<  122<H>  3.9   |  23  |  1.0    Ca    8.7      06 Jun 2019 00:15  Phos  2.5     06-06  Mg     2.1     06-06    TPro  6.7  /  Alb  4.0  /  TBili  0.9  /  DBili  0.2  /  AST  32  /  ALT  33  /  AlkPhos  36  06-04    PT/INR - ( 04 Jun 2019 16:40 )   PT: 12.80 sec;   INR: 1.11 ratio         PTT - ( 04 Jun 2019 16:40 )  PTT:33.8 sec      Imaging:  < from: CT Head No Cont (06.05.19 @ 02:21) >  IMPRESSION:    1.  Subdural hemorrhage over the right frontal and temporal convexities   and left cerebellopontine angle cistern, not significantly changed since   the prior study.    2.  Stable right frontal, right temporal and left cerebellar contusions.     3.  No midline shift. Stable ventricular size.    4.  Scattered subarachnoid hemorrhage and small intraventricular   hemorrhage are again noted.     5.  Redemonstration of large left parietal/occipital extracalvarial   hematoma and soft tissue swelling with slight widening of the left   lambdoid suture.    6.  Visualized paranasal sinuses and bilateral mastoid air cells are   well-aerated.        GEORGI MONTANEZ M.D., RESIDENT RADIOLOGIST  This document has been electronically signed.  MARVIN SCOTT M.D., ATTENDING RADIOLOGIST  This document has been electronically signed. Jun 5 2019  7:45AM        < end of copied text >      Assessment/Plan: as above  ok for hep sub q, repeat scan if change in mental status  no neurosurgical intervention at this time  can re-call if needed, follow up as outpatient with Dr. Knapp [phone]  Pt/rehab  d/w attending

## 2019-06-06 NOTE — PROGRESS NOTE ADULT - ASSESSMENT
84y old male s/p fall with SDH right frontotemporal, scattered SAH, small layering interventricular hemorrhage, hemorrhagic contusion of Right frontal, temporal lobes, left cerebellum.  stable, afebrile  LOC unchanged     PLAN:  Continue ICU  neuro checks  keppra continues  DVT ppx  Hold SUbq ppx

## 2019-06-06 NOTE — PROGRESS NOTE ADULT - ASSESSMENT
ASSESSMENT:  84M w/ PMHx of HTN, HLD, BPH, Glaucoma s/p fall at home, unwitnessed, lives alone. +HT, unknown LOC, -AC. Trauma work up with SDH right frontotemporal, scattered SAH, small layering interventricular hemorrhage, Hemorrhagic contusion of Right frontal, temporal lobes, left cerebellum, nondisplaced diastatic fracture.     PLAN:    Neurologic: s/p fall, unwitnessed, syncope? SDH right frontotemporal, scattered SAH, small layering interventricular hemorrhage, Hemorrhagic contusion of Right frontal, temporal lobes, left cerebellum, nondisplaced diastatic fracture. GCS 15, AAO x 3, Monitor for changes in mental status, Pain regimen: Tylenol.  - Neuro checks: q1h  , Seizure ppx: Keppra.   - Significant imaging: CTH: SDH right frontotemporal, scattered SAH, small layering interventricular hemorrhage, Hemorrhagic contusion of Right frontal, temporal lobes, left cerebellum, nondisplaced diastatic fracture.   - Syncope workup w/ ECHO, EEG.   - Hx of dementia? takes memantine at home    Respiratory: No Acute Dx, monitor for respiratory ditress, pulse oxymetry, Incentive spirometer, O2NC PRN, maintain Sat >90%    Cardiovascular: No Acute Dx, Keep Normotensive,   - Hx of HTN, HLD takes atenolol, amlodipine, rosuvastatin at home    Gastrointestinal/Nutrition: No Acute Dx, NPO, LR @100, GI Prophylaxis w/ Protonix 40mg Daily, Bowel regimen:, senna,    Renal/Genitourinary: No Acute Dx, de la fuente in place. Strict I&O's, Replete electrolytes PRN.  - Hx of BPH takes flomax at home restarted.   - Light pink urine, please monitor     Hematologic: No Acute Dx, Monitor Hb, SCDs,, Hold Chemoprophylaxis as per neuro sx     Infectious Disease: No Acute Dx. Afebrile, WBC 14, Monitor for fevers, leukocytosis    Endocrine: No Acute Dx. Monitor FS q6h.  - Hx of HLD restarted on atorvastatin    Lines/Tubes: PIV.    Disposition: Downgrade from ICU ASSESSMENT:  84M w/ PMHx of HTN, HLD, BPH, Glaucoma s/p fall at home, unwitnessed, lives alone. +HT, unknown LOC, -AC. Trauma work up with SDH right frontotemporal, scattered SAH, small layering interventricular hemorrhage, Hemorrhagic contusion of Right frontal, temporal lobes, left cerebellum, nondisplaced diastatic fracture.     PLAN:    Neurologic: s/p fall, unwitnessed, syncope? SDH right frontotemporal, scattered SAH, small layering interventricular hemorrhage, Hemorrhagic contusion of Right frontal, temporal lobes, left cerebellum, nondisplaced diastatic fracture. GCS 15, AAO x 3, Monitor for changes in mental status, Pain regimen: Tylenol.  - Neuro checks: q4h  , Seizure ppx: Keppra.   - Significant imaging: CTH: SDH right frontotemporal, scattered SAH, small layering interventricular hemorrhage, Hemorrhagic contusion of Right frontal, temporal lobes, left cerebellum, nondisplaced diastatic fracture, repeat stable  - Pending EEG  - Hx of dementia- takes memantine at home, restarted    Respiratory: No Acute Dx, monitor for respiratory distress pulse oxymetry, Incentive spirometer, O2NC PRN, maintain Sat >90%  -satting >93% on RA    Cardiovascular: No Acute Dx, Keep Normotensive,   - Hx of HTN, HLD takes atenolol, amlodipine, rosuvastatin at home  -Echo 6/5 (%), LV increased wall thicknesas, sclerotic aortic valve    Gastrointestinal/Nutrition: No Acute Dx,DASH IVL, GI Prophylaxis w/ Protonix 40mg Daily, Bowel regimen:, senna,    Renal/Genitourinary: No Acute Dx, de la fuente in place. Strict I&O's, Replete electrolytes PRN.  - Hx of BPH takes flomax at home restarted.   Sodium, Serum: 138 mmol/L (06.06.19 @ 00:15)  Potassium, Serum: 3.9 mmol/L (06.06.19 @ 00:15)  Magnesium, Serum: 2.1 mg/dL (06.06.19 @ 00:15)      Hematologic: No Acute Dx, Monitor Hb, SCDs,, restarted HSQ  H/H 15-->-->13-->13.6    Infectious Disease: No Acute Dx. Afebrile, WBC 14, Monitor for fevers, leukocytosis  WBC Count: 14.16 K/uL (06.06.19 @ 00:15)    Endocrine: No Acute Dx. Monitor FS q6h.  - Hx of HLD restarted on atorvastatin    Lines/Tubes: PIV.    Disposition: Downgrade from ICU

## 2019-06-06 NOTE — CHART NOTE - NSCHARTNOTEFT_GEN_A_CORE
SICU Transfer Note    ASCENCION LANGLEY  **********DNI/DNR**********  84y (1935)  [id number]      Transfer from: SICU  Transfer to: Surgery-Trauma      SICU COURSE:  84y Male HD# 2d    s/p q1 neurochecks for SDH/SAH s/p fall  Overnight no acute events, patient had echo, eeg done for syncope workup    PAST MEDICAL & SURGICAL HISTORY:    Allergies    No Known Allergies    Intolerances      MEDICATIONS  (STANDING):  acetaminophen   Tablet .. 650 milliGRAM(s) Oral every 6 hours  ATENolol  Tablet 25 milliGRAM(s) Oral daily  atorvastatin 20 milliGRAM(s) Oral at bedtime  chlorhexidine 4% Liquid 1 Application(s) Topical <User Schedule>  fluorometholone 0.1% Suspension 1 Drop(s) Both EYES daily  heparin  Injectable 5000 Unit(s) SubCutaneous every 8 hours  levETIRAcetam  IVPB 500 milliGRAM(s) IV Intermittent every 12 hours  memantine 10 milliGRAM(s) Oral daily  pantoprazole    Tablet 40 milliGRAM(s) Oral before breakfast  tamsulosin 0.4 milliGRAM(s) Oral at bedtime  timolol 0.5% Solution 1 Drop(s) Both EYES daily    MEDICATIONS  (PRN):      Vital Signs Last 24 Hrs  T(C): 35.7 (2019 08:00), Max: 36.7 (2019 16:00)  T(F): 96.2 (2019 08:00), Max: 98 (2019 16:00)  HR: 90 (2019 11:00) (72 - 90)  BP: 150/70 (2019 11:00) (110/51 - 168/84)  BP(mean): 87 (2019 11:00) (64 - 129)  RR: 20 (2019 11:00) (15 - 31)  SpO2: 98% (2019 11:00) (95% - 100%)  I&O's Summary    2019 07:01  -  2019 07:00  --------------------------------------------------------  IN: 300 mL / OUT: 1850 mL / NET: -1550 mL    2019 07:01  -  2019 11:32  --------------------------------------------------------  IN: 0 mL / OUT: 170 mL / NET: -170 mL        LABS  LABS:  CAPILLARY BLOOD GLUCOSE                              13.6   14.16 )-----------( 159      ( 2019 00:15 )             41.3       06-06    138  |  101  |  22<H>  ----------------------------<  122<H>  3.9   |  23  |  1.0    Ca    8.7      2019 00:15  Phos  2.5     06-06  Mg     2.1     06-06    TPro  6.7  /  Alb  4.0  /  TBili  0.9  /  DBili  0.2  /  AST  32  /  ALT  33  /  AlkPhos  36  06-04      PT/INR - ( 2019 16:40 )   PT: 12.80 sec;   INR: 1.11 ratio         PTT - ( 2019 16:40 )  PTT:33.8 sec  CARDIAC MARKERS ( 2019 17:42 )  x     / x     / 221 U/L / x     / x          Urinalysis Basic - ( 2019 18:55 )    Color: Yellow / Appearance: Clear / S.020 / pH: x  Gluc: x / Ketone: 40  / Bili: Negative / Urobili: 0.2 mg/dL   Blood: x / Protein: 30 mg/dL / Nitrite: Negative   Leuk Esterase: Negative / RBC: x / WBC x   Sq Epi: x / Non Sq Epi: Occasional /HPF / Bacteria: Few /HPF        Culture - Urine (collected 2019 18:55)  Source: .Urine Clean Catch (Midstream)  Final Report (2019 22:00):    No growth            Assessment & Plan:    84yMale      Neurologic: s/p  SDH right frontotemporal, scattered SAH, small layering interventricular hemorrhage, Hemorrhagic contusion of Right frontal, temporal lobes, left cerebellum, nondisplaced diastatic fracture.   GCS 15, AAO x 3, waxing, waning exam  - Neuro checks: q4h  , Seizure ppx: Keppra.   -EEg done pending read  - Hx of dementia- takes memantine at home, restarted  -Will require 1:1    Respiratory: No Acute Dx, RA  -COLTEN uses cpap at home, son Marvin will try to bring in device for use tonight  -DNI    Cardiovascular: No Acute Dx, Keep Normotensive,   - Hx of HTN, HLD takes atenolol, amlodipine, rosuvastatin at home  -Echo  (%), LV increased wall thicknesas, sclerotic aortic valve  -DNR    Gastrointestinal/Nutrition: No Acute Dx,DASH IVL, PPI, Senna    Renal/Genitourinary: No Acute Dx, de la fuente in place d/c  or earlier as per primary team, retained  -Hx of BPH takes flomax at home restarted.   -replete lytes as prn    Hematologic: HSQ, H/H stable  -carotid duplex pending     Infectious Disease: No Acute Dx. Afebrile, WBC 14, Monitor for fevers, leukocytosis    Endocrine: No Acute Dx.  - Hx of HLD restarted on atorvastatin    Lines/Tubes: PIV.    Disposition: Downgrade from ICU d/w team       Follow Up:  -EEG final read  -Carotid duplex  -Patient has COLTEN, Marvin (son) will bring in CPAP device tonight, otherwise use hospital device  -Will require 1:1 on floor  -2330 labs      Signed out to:  Date:  Time: SICU Transfer Note    ASCENCION LANGLEY  **********DNI/DNR**********  84y (1935)  [id number]      Transfer from: SICU  Transfer to: Surgery-Trauma      SICU COURSE:  84y Male HD# 2d    s/p q1 neurochecks for SDH/SAH s/p fall  Overnight no acute events, patient had echo, eeg done for syncope workup    PAST MEDICAL & SURGICAL HISTORY:    Allergies    No Known Allergies    Intolerances      MEDICATIONS  (STANDING):  acetaminophen   Tablet .. 650 milliGRAM(s) Oral every 6 hours  ATENolol  Tablet 25 milliGRAM(s) Oral daily  atorvastatin 20 milliGRAM(s) Oral at bedtime  chlorhexidine 4% Liquid 1 Application(s) Topical <User Schedule>  fluorometholone 0.1% Suspension 1 Drop(s) Both EYES daily  heparin  Injectable 5000 Unit(s) SubCutaneous every 8 hours  levETIRAcetam  IVPB 500 milliGRAM(s) IV Intermittent every 12 hours  memantine 10 milliGRAM(s) Oral daily  pantoprazole    Tablet 40 milliGRAM(s) Oral before breakfast  tamsulosin 0.4 milliGRAM(s) Oral at bedtime  timolol 0.5% Solution 1 Drop(s) Both EYES daily    MEDICATIONS  (PRN):      Vital Signs Last 24 Hrs  T(C): 35.7 (2019 08:00), Max: 36.7 (2019 16:00)  T(F): 96.2 (2019 08:00), Max: 98 (2019 16:00)  HR: 90 (2019 11:00) (72 - 90)  BP: 150/70 (2019 11:00) (110/51 - 168/84)  BP(mean): 87 (2019 11:00) (64 - 129)  RR: 20 (2019 11:00) (15 - 31)  SpO2: 98% (2019 11:00) (95% - 100%)  I&O's Summary    2019 07:01  -  2019 07:00  --------------------------------------------------------  IN: 300 mL / OUT: 1850 mL / NET: -1550 mL    2019 07:01  -  2019 11:32  --------------------------------------------------------  IN: 0 mL / OUT: 170 mL / NET: -170 mL        LABS  LABS:  CAPILLARY BLOOD GLUCOSE                              13.6   14.16 )-----------( 159      ( 2019 00:15 )             41.3       06-06    138  |  101  |  22<H>  ----------------------------<  122<H>  3.9   |  23  |  1.0    Ca    8.7      2019 00:15  Phos  2.5     06-06  Mg     2.1     06-06    TPro  6.7  /  Alb  4.0  /  TBili  0.9  /  DBili  0.2  /  AST  32  /  ALT  33  /  AlkPhos  36  06-04      PT/INR - ( 2019 16:40 )   PT: 12.80 sec;   INR: 1.11 ratio         PTT - ( 2019 16:40 )  PTT:33.8 sec  CARDIAC MARKERS ( 2019 17:42 )  x     / x     / 221 U/L / x     / x          Urinalysis Basic - ( 2019 18:55 )    Color: Yellow / Appearance: Clear / S.020 / pH: x  Gluc: x / Ketone: 40  / Bili: Negative / Urobili: 0.2 mg/dL   Blood: x / Protein: 30 mg/dL / Nitrite: Negative   Leuk Esterase: Negative / RBC: x / WBC x   Sq Epi: x / Non Sq Epi: Occasional /HPF / Bacteria: Few /HPF        Culture - Urine (collected 2019 18:55)  Source: .Urine Clean Catch (Midstream)  Final Report (2019 22:00):    No growth            Assessment & Plan:    84yMale      Neurologic: s/p  SDH right frontotemporal, scattered SAH, small layering interventricular hemorrhage, Hemorrhagic contusion of Right frontal, temporal lobes, left cerebellum, nondisplaced diastatic fracture.   GCS 15, AAO x 3, waxing, waning exam  - Neuro checks: q4h  , Seizure ppx: Keppra.   -EEg done pending read  - Hx of dementia- takes memantine at home, restarted  -Will require 1:1    Respiratory: No Acute Dx, RA  -COLTEN uses cpap at home, son Marvin will try to bring in device for use tonight  -DNI    Cardiovascular: No Acute Dx, Keep Normotensive,   - Hx of HTN, HLD takes atenolol, amlodipine, rosuvastatin at home  -Echo  (%), LV increased wall thicknesas, sclerotic aortic valve  -DNR    Gastrointestinal/Nutrition: No Acute Dx,DASH IVL, PPI, Senna    Renal/Genitourinary: No Acute Dx, de la fuente in place d/c  or earlier as per primary team, retained  -Hx of BPH takes flomax at home restarted.   -replete lytes as prn    Hematologic: HSQ, H/H stable  -carotid duplex pending     Infectious Disease: No Acute Dx. Afebrile, WBC 14, Monitor for fevers, leukocytosis    Endocrine: No Acute Dx.  - Hx of HLD restarted on atorvastatin    Lines/Tubes: PIV.    Disposition: Downgrade from ICU d/w team       Follow Up:  -EEG final read  -Carotid duplex  -Patient has COLTEN, Marvin (son) will bring in CPAP device tonight, otherwise use hospital device  -Will require 1:1 on floor  -2330 labs      Signed out to: Dr Olguin   Date: 19  Time: 14:10

## 2019-06-07 LAB
ANION GAP SERPL CALC-SCNC: 12 MMOL/L — SIGNIFICANT CHANGE UP (ref 7–14)
ANION GAP SERPL CALC-SCNC: 13 MMOL/L — SIGNIFICANT CHANGE UP (ref 7–14)
BASOPHILS # BLD AUTO: 0.03 K/UL — SIGNIFICANT CHANGE UP (ref 0–0.2)
BASOPHILS NFR BLD AUTO: 0.3 % — SIGNIFICANT CHANGE UP (ref 0–1)
BUN SERPL-MCNC: 19 MG/DL — SIGNIFICANT CHANGE UP (ref 10–20)
BUN SERPL-MCNC: 20 MG/DL — SIGNIFICANT CHANGE UP (ref 10–20)
CALCIUM SERPL-MCNC: 8.2 MG/DL — LOW (ref 8.5–10.1)
CALCIUM SERPL-MCNC: 8.6 MG/DL — SIGNIFICANT CHANGE UP (ref 8.5–10.1)
CHLORIDE SERPL-SCNC: 100 MMOL/L — SIGNIFICANT CHANGE UP (ref 98–110)
CHLORIDE SERPL-SCNC: 102 MMOL/L — SIGNIFICANT CHANGE UP (ref 98–110)
CO2 SERPL-SCNC: 21 MMOL/L — SIGNIFICANT CHANGE UP (ref 17–32)
CO2 SERPL-SCNC: 25 MMOL/L — SIGNIFICANT CHANGE UP (ref 17–32)
CREAT SERPL-MCNC: 0.8 MG/DL — SIGNIFICANT CHANGE UP (ref 0.7–1.5)
CREAT SERPL-MCNC: 0.9 MG/DL — SIGNIFICANT CHANGE UP (ref 0.7–1.5)
EOSINOPHIL # BLD AUTO: 0.16 K/UL — SIGNIFICANT CHANGE UP (ref 0–0.7)
EOSINOPHIL NFR BLD AUTO: 1.4 % — SIGNIFICANT CHANGE UP (ref 0–8)
GLUCOSE SERPL-MCNC: 122 MG/DL — HIGH (ref 70–99)
GLUCOSE SERPL-MCNC: 129 MG/DL — HIGH (ref 70–99)
HCT VFR BLD CALC: 38.3 % — LOW (ref 42–52)
HCT VFR BLD CALC: 40.7 % — LOW (ref 42–52)
HGB BLD-MCNC: 12.7 G/DL — LOW (ref 14–18)
HGB BLD-MCNC: 13.6 G/DL — LOW (ref 14–18)
IMM GRANULOCYTES NFR BLD AUTO: 0.9 % — HIGH (ref 0.1–0.3)
LYMPHOCYTES # BLD AUTO: 1.53 K/UL — SIGNIFICANT CHANGE UP (ref 1.2–3.4)
LYMPHOCYTES # BLD AUTO: 13.1 % — LOW (ref 20.5–51.1)
MAGNESIUM SERPL-MCNC: 2.2 MG/DL — SIGNIFICANT CHANGE UP (ref 1.8–2.4)
MAGNESIUM SERPL-MCNC: 2.2 MG/DL — SIGNIFICANT CHANGE UP (ref 1.8–2.4)
MCHC RBC-ENTMCNC: 29.1 PG — SIGNIFICANT CHANGE UP (ref 27–31)
MCHC RBC-ENTMCNC: 29.1 PG — SIGNIFICANT CHANGE UP (ref 27–31)
MCHC RBC-ENTMCNC: 33.2 G/DL — SIGNIFICANT CHANGE UP (ref 32–37)
MCHC RBC-ENTMCNC: 33.4 G/DL — SIGNIFICANT CHANGE UP (ref 32–37)
MCV RBC AUTO: 87 FL — SIGNIFICANT CHANGE UP (ref 80–94)
MCV RBC AUTO: 87.8 FL — SIGNIFICANT CHANGE UP (ref 80–94)
MONOCYTES # BLD AUTO: 1.03 K/UL — HIGH (ref 0.1–0.6)
MONOCYTES NFR BLD AUTO: 8.8 % — SIGNIFICANT CHANGE UP (ref 1.7–9.3)
NEUTROPHILS # BLD AUTO: 8.78 K/UL — HIGH (ref 1.4–6.5)
NEUTROPHILS NFR BLD AUTO: 75.5 % — HIGH (ref 42.2–75.2)
NRBC # BLD: 0 /100 WBCS — SIGNIFICANT CHANGE UP (ref 0–0)
NRBC # BLD: 0 /100 WBCS — SIGNIFICANT CHANGE UP (ref 0–0)
PHOSPHATE SERPL-MCNC: 2.6 MG/DL — SIGNIFICANT CHANGE UP (ref 2.1–4.9)
PHOSPHATE SERPL-MCNC: 3.1 MG/DL — SIGNIFICANT CHANGE UP (ref 2.1–4.9)
PLATELET # BLD AUTO: 167 K/UL — SIGNIFICANT CHANGE UP (ref 130–400)
PLATELET # BLD AUTO: 183 K/UL — SIGNIFICANT CHANGE UP (ref 130–400)
POTASSIUM SERPL-MCNC: 3.8 MMOL/L — SIGNIFICANT CHANGE UP (ref 3.5–5)
POTASSIUM SERPL-MCNC: 4 MMOL/L — SIGNIFICANT CHANGE UP (ref 3.5–5)
POTASSIUM SERPL-SCNC: 3.8 MMOL/L — SIGNIFICANT CHANGE UP (ref 3.5–5)
POTASSIUM SERPL-SCNC: 4 MMOL/L — SIGNIFICANT CHANGE UP (ref 3.5–5)
RBC # BLD: 4.36 M/UL — LOW (ref 4.7–6.1)
RBC # BLD: 4.68 M/UL — LOW (ref 4.7–6.1)
RBC # FLD: 13.7 % — SIGNIFICANT CHANGE UP (ref 11.5–14.5)
RBC # FLD: 13.8 % — SIGNIFICANT CHANGE UP (ref 11.5–14.5)
SODIUM SERPL-SCNC: 135 MMOL/L — SIGNIFICANT CHANGE UP (ref 135–146)
SODIUM SERPL-SCNC: 138 MMOL/L — SIGNIFICANT CHANGE UP (ref 135–146)
WBC # BLD: 11.64 K/UL — HIGH (ref 4.8–10.8)
WBC # BLD: 12.29 K/UL — HIGH (ref 4.8–10.8)
WBC # FLD AUTO: 11.64 K/UL — HIGH (ref 4.8–10.8)
WBC # FLD AUTO: 12.29 K/UL — HIGH (ref 4.8–10.8)

## 2019-06-07 PROCEDURE — 99233 SBSQ HOSP IP/OBS HIGH 50: CPT

## 2019-06-07 PROCEDURE — 95819 EEG AWAKE AND ASLEEP: CPT | Mod: 26

## 2019-06-07 RX ADMIN — HEPARIN SODIUM 5000 UNIT(S): 5000 INJECTION INTRAVENOUS; SUBCUTANEOUS at 21:06

## 2019-06-07 RX ADMIN — MEMANTINE HYDROCHLORIDE 10 MILLIGRAM(S): 10 TABLET ORAL at 15:19

## 2019-06-07 RX ADMIN — PANTOPRAZOLE SODIUM 40 MILLIGRAM(S): 20 TABLET, DELAYED RELEASE ORAL at 05:00

## 2019-06-07 RX ADMIN — LEVETIRACETAM 420 MILLIGRAM(S): 250 TABLET, FILM COATED ORAL at 17:31

## 2019-06-07 RX ADMIN — HEPARIN SODIUM 5000 UNIT(S): 5000 INJECTION INTRAVENOUS; SUBCUTANEOUS at 14:57

## 2019-06-07 RX ADMIN — ATENOLOL 25 MILLIGRAM(S): 25 TABLET ORAL at 05:00

## 2019-06-07 RX ADMIN — HEPARIN SODIUM 5000 UNIT(S): 5000 INJECTION INTRAVENOUS; SUBCUTANEOUS at 05:01

## 2019-06-07 RX ADMIN — Medication 650 MILLIGRAM(S): at 12:47

## 2019-06-07 RX ADMIN — Medication 650 MILLIGRAM(S): at 17:42

## 2019-06-07 RX ADMIN — Medication 650 MILLIGRAM(S): at 00:30

## 2019-06-07 RX ADMIN — Medication 650 MILLIGRAM(S): at 17:32

## 2019-06-07 RX ADMIN — Medication 650 MILLIGRAM(S): at 05:00

## 2019-06-07 RX ADMIN — TAMSULOSIN HYDROCHLORIDE 0.4 MILLIGRAM(S): 0.4 CAPSULE ORAL at 21:06

## 2019-06-07 RX ADMIN — LEVETIRACETAM 420 MILLIGRAM(S): 250 TABLET, FILM COATED ORAL at 05:00

## 2019-06-07 RX ADMIN — FLUOROMETHOLONE 1 DROP(S): 1 SOLUTION/ DROPS OPHTHALMIC at 12:46

## 2019-06-07 RX ADMIN — Medication 1 DROP(S): at 12:46

## 2019-06-07 RX ADMIN — Medication 650 MILLIGRAM(S): at 06:53

## 2019-06-07 RX ADMIN — ATORVASTATIN CALCIUM 20 MILLIGRAM(S): 80 TABLET, FILM COATED ORAL at 21:06

## 2019-06-07 NOTE — PROGRESS NOTE ADULT - SUBJECTIVE AND OBJECTIVE BOX
ASCENCION LANGLEY  84y Male    INTERVAL HPI/OVERNIGHT EVENTS:    Called by surgery to evaluate pt for abnormal EEG.   Pt now in the hallway with Posey restraint and in chair.  Son and daughter at bedside.  He still has altered mental status per family.  He is eating all of his meals.  No issues today per RN.   He denies pain.   Son brought CPAP machine - pt is compliant with CPAP at home.    Pt was living alone at home - daughter in [place] and son in [place].   He has mild dementia but cooked for himself, was still driving, and went to [facility] weekly.  He has had multiple recent falls.   Has a shuffling like gait per family.       T(F): 98.1 (06-07-19 @ 07:30), Max: 98.1 (06-07-19 @ 07:30)  HR: 64 (06-07-19 @ 07:30) (64 - 92)  BP: 153/77 (06-07-19 @ 07:30) (141/90 - 175/72)  RR: 18 (06-07-19 @ 07:30) (18 - 38)  SpO2: 97% (06-06-19 @ 21:00) (96% - 98%)  I&O's Summary    06 Jun 2019 07:01  -  07 Jun 2019 07:00  --------------------------------------------------------  IN: 100 mL / OUT: 2140 mL / NET: -2040 mL      PHYSICAL EXAM:  GENERAL: NAD  HEAD:  Normocephalic, left posterior hematoma  EYES:  conjunctiva and sclera clear  ENMT: Moist mucous membranes  NECK: Supple  NERVOUS SYSTEM: sleepy but arousable with voice and sternal rub, follows some commands, moves arms but left UE appeared weaker  gait not assessed  CHEST/LUNG: Clear to percussion bilaterally; No rales, rhonchi, wheezing  HEART: Regular rate and rhythm  ABDOMEN: Soft, Nontender, mildly distended; Bowel sounds present  EXTREMITIES:   No edema  SKIN: ecchymoses of both arms     Consultant(s) Notes Reviewed:  [x ] YES  [ ] NO  Care Discussed with Consultants/Other Providers [ x] YES  [ ] NO    MEDICATIONS  (STANDING):  acetaminophen   Tablet .. 650 milliGRAM(s) Oral every 6 hours  amLODIPine   Tablet 2.5 milliGRAM(s) Oral daily  ATENolol  Tablet 25 milliGRAM(s) Oral daily  atorvastatin 20 milliGRAM(s) Oral at bedtime  chlorhexidine 4% Liquid 1 Application(s) Topical <User Schedule>  fluorometholone 0.1% Suspension 1 Drop(s) Both EYES daily  heparin  Injectable 5000 Unit(s) SubCutaneous every 8 hours  levETIRAcetam  IVPB 500 milliGRAM(s) IV Intermittent every 12 hours  memantine 10 milliGRAM(s) Oral daily  pantoprazole    Tablet 40 milliGRAM(s) Oral before breakfast  tamsulosin 0.4 milliGRAM(s) Oral at bedtime  timolol 0.5% Solution 1 Drop(s) Both EYES daily    MEDICATIONS  (PRN):      LABS:                        13.6   12.29 )-----------( 167      ( 07 Jun 2019 00:56 )             40.7     06-07    135  |  102  |  19  ----------------------------<  122<H>  3.8   |  21  |  0.8    Ca    8.2<L>      07 Jun 2019 00:56  Phos  2.6     06-07  Mg     2.2     06-07          Culture - Urine (collected 04 Jun 2019 18:55)  Source: .Urine Clean Catch (Midstream)  Final Report (05 Jun 2019 22:00):    No growth        RADIOLOGY & ADDITIONAL TESTS:    Imaging or report Personally Reviewed:  [x ] YES  [ ] NO    < from: CT Head No Cont (06.05.19 @ 02:21) >  Findings/  IMPRESSION:    1.  Subdural hemorrhage over the right frontal and temporal convexities   and left cerebellopontine angle cistern, not significantly changed since   the prior study.    2.  Stable right frontal, right temporal and left cerebellar contusions.     3.  No midline shift. Stable ventricular size.    4.  Scattered subarachnoid hemorrhage and small intraventricular   hemorrhage are again noted.     5.  Redemonstration of large left parietal/occipital extracalvarial   hematoma and soft tissue swelling with slight widening of the left   lambdoid suture.    6.  Visualized paranasal sinuses and bilateral mastoid air cells are   well-aerated.      < end of copied text >    < from: CT Abdomen and Pelvis w/ IV Cont (06.04.19 @ 20:23) >  IMPRESSION:    No CT evidence for acute traumatic injury to the chest, abdomen or pelvis.    < end of copied text >    < from: CT Cervical Spine No Cont (06.04.19 @ 20:19) >  IMPRESSION: No acute cervical spine pathology    < end of copied text >    < from: Xray Chest 1 View- PORTABLE-Routine (06.06.19 @ 05:45) >  Impression:      No radiographic evidence of acute cardiopulmonary disease.      < end of copied text >    < from: Xray Knee 3 Views, Bilateral (06.04.19 @ 17:10) >  Findings/  impression:    No acute fracture or dislocation. Soft tissues are unremarkable. Vascular   calcifications are seen. There is no large suprapatellar joint effusion.   Mild bilateral joint space narrowing is seen.      < end of copied text >    < from: Xray Forearm, Left (06.04.19 @ 17:10) >  Findings/  impression:    No acute fracture or dislocation is seen. Soft tissues are unremarkable.      < end of copied text >    < from: Xray Pelvis AP only (06.04.19 @ 16:41) >  Findings/  impression: No acutely displaced fracture. Moderate bilateral hip   degenerative change. Lower lumbar degenerative changes present. Vascular   calcifications noted.      < end of copied text >    < from: Transthoracic Echocardiogram (06.05.19 @ 11:02) >    Summary:   1. LV Ejection Fraction by Pal's Method with a biplane EF of 65 %.   2. Mildly increased LV wall thickness.   3. Sclerotic aortic valve with decreased opening.    < end of copied text >    < from: VA Duplex Carotid, Bilat (06.06.19 @ 13:56) >  Impression:    20-39% stenosis of the right internal carotid artery.    20-39% stenosis of the left internal carotid artery.      < end of copied text >    < from: EEG (06.06.19 @ 11:00) >  STATE  Awake and Drowsy     SYMMETRY  Symmetrical     ORGANIZATION:  Slightly less than optimally organized      PDR  8-9 Hz superimposed by small amount of lower range low amplitude theta   seen diffusely over both hemispheres    GENERALIZED SLOWING  Borderline generalized slowing      FOCAL SLOWING  Right frontotemporal focalslowing      AREA OF FOCAL SLOWING  As above      BREACH ARTIFACT  As above      Hyper Ventilation  Was not performed        Photic Stimulation  Was not performed      EPILEPTIFORM ACTIVITY  No epileptiform activity      TYPE  As above  As above      EVENTS  No events reported or recorded      IMPRESSIONS  This is an abnormal EEG due to the presence of borderline to mild   generalized and also right frontotemporal focal slowing      CLINICAL CORRELATION  System and feet borderline to mild diffusecerebral electrophysiological   dysfunction and right hemispheric focal electrophysiological dysfunction     < end of copied text >        Case discussed with Dr. Monzon today    Care discussed with pt's family

## 2019-06-07 NOTE — PROGRESS NOTE ADULT - SUBJECTIVE AND OBJECTIVE BOX
GENERAL SURGERY PROGRESS NOTE     ASCENCION LANGLEY  84y  Male  Hospital day :3d    OVERNIGHT EVENTS:no acute events overnight, patient is ambulating, tolerating diet, de la fuente out in the am, echo showed- EF 65%.    T(F): 98.1 (06-07-19 @ 07:30), Max: 98.1 (06-07-19 @ 07:30)  HR: 64 (06-07-19 @ 07:30) (64 - 92)  BP: 153/77 (06-07-19 @ 07:30) (141/90 - 175/72)    RR: 18 (06-07-19 @ 07:30) (15 - 38)  SpO2: 97% (06-06-19 @ 21:00) (96% - 98%)        URINE:   06-06-19 @ 07:01  -  06-07-19 @ 07:00  --------------------------------------------------------  OUT: 2140 mL       GI proph:  pantoprazole    Tablet 40 milliGRAM(s) Oral before breakfast    AC/ proph: heparin  Injectable 5000 Unit(s) SubCutaneous every 8 hours      PHYSICAL EXAM:  GENERAL: NAD  CHEST/LUNG: Clear to auscultation bilaterally  HEART: Regular rate and rhythm  ABDOMEN: Soft, Nontender, Nondistended;         LABS  Labs:  CAPILLARY BLOOD GLUCOSE                              13.6   12.29 )-----------( 167      ( 07 Jun 2019 00:56 )             40.7         06-07    135  |  102  |  19  ----------------------------<  122<H>  3.8   |  21  |  0.8      Calcium, Total Serum: 8.2 mg/dL (06-07-19 @ 00:56)      LFTs:     Blood Gas Venous - Lactate: 4.5 mmoL/L (06-04-19 @ 21:30)  Lactate, Blood: 3.9 mmol/L (06-04-19 @ 16:40)        Culture - Urine (collected 04 Jun 2019 18:55)  Source: .Urine Clean Catch (Midstream)  Final Report (05 Jun 2019 22:00):    No growth

## 2019-06-07 NOTE — PROGRESS NOTE ADULT - ASSESSMENT
85 y/o man with PMH of mild dementia (lives alone at home and independent in ADL's and still driving), HTN, s/p cataract removal, COLTEN on CPAP, BPH, dyslipidemia, hearing impaired (uses hearing aids b/l), and recent falls was brought to the ER for s/p fall and altered mental status. He was admitted to SICU and is now downgraded to the floor.    1. s/p unwitnessed fall (?syncope) with resultant SDH, contusions, SAH and small IVH and large left parietal/occipital hematoma  followed by Neurosurgery who recommended no neurosurgical intervention and pt now cleared for outpt f/u from their standpoint  fall precautions  on keppra for seizure prophylaxis  EEG report reviewed - no seizure activity, has abnormality in same region as SDH - neurology consult pending for their evaluation  had syncopal workup including ECHO, carotid doppler, unit monitoring (was on tele monitor)  check orthostatics when able  seen by rehab  physical therapy eval  likely SNF placement on discharge    2. Altered mental status/lethargy - likely multifactorial due to SDH, contusions in pt with mild dementia, now on keppra and not receiving CPAP at night  neuro checks  reorient frequently    3. COLTEN - son brought in CPAP machine from home  call respiratory to set up CPAP tonight    4. Hearing impaired - use aids    5. HTN - continue current management  increase amlodipine to 5mg daily if SBP persistently > 150    6. Dyslipidemia on statin    7. Leukocytosis on admission - no fever and no evidence of infection  could be due to SDH, contusions  improving    8. Continue current management for dementia and BPH    9. DVT prophylaxis - on heparin SQ    Please recall with any questions or concerns.   If no further workup recommended by neurology, then continue discharge planning.

## 2019-06-07 NOTE — CONSULT NOTE ADULT - SUBJECTIVE AND OBJECTIVE BOX
HPI:  84y m  TRAUMA ACTIVATION LEVEL:  Trauma alert    MECHANISM OF INJURY: s/p unwitnessed fall (+HT, ?LOC, -AC)     [] Blunt               [] MVC	                       [X] Fall	                 [] Pedestrian Struck	     [] Motorcycle    [] Assault                       [] Bicycle collision  [] Sports injury     [] Penetrating    [] Gun Shot Wound    [] Stab Wound    GCS: 	15  E: 4	V: 5	M: 6    HPI:   84y old male, BIBEMS, s/p unwitnessed fall (+HT, ?LOC, -AC); patient states he fell in his house, in his kitchen. No LOC. Noted by daughter to have bruising to head. Daughter called her father this morning states that he was acting strange. He said he was tired and went back to sleep. This was at 10am and very unusual for the pt as per daughter because pt rises early in morning and does not oversleep. Daughter then drove from [place] to her fathers home and noted pt has large contusion to his scalp. Daughter then brought pt to the ED for evaluation No nausea, no vomiting. No seizure activity. No blood thinners. No recent illness, no fever, no chest pain or shortness of breath. No abdominal pain.    PAST MEDICAL & SURGICAL HISTORY:  HTN, HLD, BPH, Glaucoma    Home Meds: Home Medications:  AMLODIPINE   TAB 2.5MG:  (04 Jun 2019 23:38)  ATENOLOL     TAB 25MG:  (04 Jun 2019 23:38)  FLUOROMETHOL ELLA 0.1% OP:  (04 Jun 2019 23:38)  MEMANTINE    TAB HCL 10MG:  (04 Jun 2019 23:38)  ROSUVASTATIN TAB 5MG:  (04 Jun 2019 23:38)  TAMSULOSIN   CAP 0.4MG:  (04 Jun 2019 23:38)  TIMOLOL MAL  SOL 0.5% OP:  (04 Jun 2019 23:38)    Allergies: AllergiesNo Known Allergies    Advanced Directives: Presumed Full Code (05 Jun 2019 00:06)      PAST MEDICAL & SURGICAL HISTORY:      Hospital Course:    TODAY'S SUBJECTIVE & REVIEW OF SYMPTOMS:     Constitutional WNL   Cardio WNL   Resp WNL   GI WNL  Heme WNL  Endo WNL  Skin WNL  MSK WNL  Neuro WNL  Cognitive confused      MEDICATIONS  (STANDING):  acetaminophen   Tablet .. 650 milliGRAM(s) Oral every 6 hours  amLODIPine   Tablet 2.5 milliGRAM(s) Oral daily  ATENolol  Tablet 25 milliGRAM(s) Oral daily  atorvastatin 20 milliGRAM(s) Oral at bedtime  chlorhexidine 4% Liquid 1 Application(s) Topical <User Schedule>  fluorometholone 0.1% Suspension 1 Drop(s) Both EYES daily  heparin  Injectable 5000 Unit(s) SubCutaneous every 8 hours  levETIRAcetam  IVPB 500 milliGRAM(s) IV Intermittent every 12 hours  memantine 10 milliGRAM(s) Oral daily  pantoprazole    Tablet 40 milliGRAM(s) Oral before breakfast  tamsulosin 0.4 milliGRAM(s) Oral at bedtime  timolol 0.5% Solution 1 Drop(s) Both EYES daily    MEDICATIONS  (PRN):      FAMILY HISTORY:      Allergies    No Known Allergies    Intolerances        SOCIAL HISTORY:    [  ] Etoh  [  ] Smoking  [  ] Substance abuse     Home Environment:  [ x ] Home Alone  [  ] Lives with Family  [  ] Home Health Aid    Dwelling:  [  ] Apartment  [x  ] Private House  [  ] Adult Home  [  ] Skilled Nursing Facility      [  ] Short Term  [  ] Long Term  [x  ] Stairs       Elevator [  ]    FUNCTIONAL STATUS PTA: (Check all that apply)  Ambulation: [ x  ]Independent    [  ] Dependent     [  ] Non-Ambulatory  Assistive Device: [  ] SA Cane  [  ]  Q Cane  [  ] Walker  [  ]  Wheelchair  ADL : [x  ] Independent  [  ]  Dependent       Vital Signs Last 24 Hrs  T(C): 36.7 (07 Jun 2019 07:30), Max: 36.7 (07 Jun 2019 07:30)  T(F): 98.1 (07 Jun 2019 07:30), Max: 98.1 (07 Jun 2019 07:30)  HR: 64 (07 Jun 2019 07:30) (64 - 92)  BP: 153/77 (07 Jun 2019 07:30) (141/90 - 175/72)  BP(mean): 111 (06 Jun 2019 21:00) (98 - 130)  RR: 18 (07 Jun 2019 07:30) (18 - 38)  SpO2: 97% (06 Jun 2019 21:00) (96% - 98%)      PHYSICAL EXAM: Alert & awake  GENERAL: NAD, well-groomed, well-developed  HEAD:  Atraumatic, Normocephalic  CHEST/LUNG: Clear   HEART: S1S2+  ABDOMEN: Soft, Nontender  EXTREMITIES:  no calf tenderness    NERVOUS SYSTEM:  Cranial Nerves 2-12 intact [  ] Abnormal  [  ]  ROM: WFL all extremities [ x ]  Abnormal [  ]  Motor Strength: WFL all extremities  [x  ]  Abnormal [  ]  Sensation: intact to light touch [  ] Abnormal [  ]  Reflexes: Symmetric [  ]  Abnormal [  ]    FUNCTIONAL STATUS:  Bed Mobility: Independent [  ]  Supervision [  ]  Needs Assistance [ x ]  N/A [  ]  Transfers: Independent [  ]  Supervision [  ]  Needs Assistance [x  ]  N/A [  ]   Ambulation: Independent [  ]  Supervision [  ]  Needs Assistance [  ]  N/A [  ]  ADL: Independent [  ] Requires Assistance [  ] N/A [  ]      LABS:                        13.6   12.29 )-----------( 167      ( 07 Jun 2019 00:56 )             40.7     06-07    135  |  102  |  19  ----------------------------<  122<H>  3.8   |  21  |  0.8    Ca    8.2<L>      07 Jun 2019 00:56  Phos  2.6     06-07  Mg     2.2     06-07            RADIOLOGY & ADDITIONAL STUDIES:    Assesment:

## 2019-06-07 NOTE — PROGRESS NOTE ADULT - ASSESSMENT
84 year old male s/p fall with SDH right frontoparietal, scattered SAH, haemorrahagic contusions of the rt temporal lobe   plan:  1.FU trial of void   2.PT/rehab  3.SW consult   4.Encourage ambulation   5.encourage incentive

## 2019-06-07 NOTE — CONSULT NOTE ADULT - ASSESSMENT
IMPRESSION: Rehab of SDH    PRECAUTIONS: [  ] Cardiac  [  ] Respiratory  [  ] Seizures [  ] Contact Isolation  [  ] Droplet Isolation  [  ] Other    Weight Bearing Status:     RECOMMENDATION:    Out of Bed to Chair     DVT/Decubiti Prophylaxis    REHAB PLAN:     [ x  ] Bedside P/T 3-5 times a week   [   ]   Bedside O/T  2-3 times a week             [   ] No Rehab Therapy Indicated                   [   ]  Speech Therapy   Conditioning/ROM                                    ADL  Bed Mobility                                               Conditioning/ROM  Transfers                                                     Bed Mobility  Sitting /Standing Balance                         Transfers                                        Gait Training                                               Sitting/Standing Balance  Stair Training [   ]Applicable                    Home equipment Eval                                                                        Splinting  [   ] Only      GOALS:   ADL   [   ]   Independent                    Transfers  [  x ] Independent                          Ambulation  [ x  ] Independent     [  x  ] With device                            [   ]  CG                                                         [   ]  CG                                                                  [   ] CG                            [    ] Min A                                                   [   ] Min A                                                              [   ] Min  A          DISCHARGE PLAN:   [   ]  Good candidate for Intensive Rehabilitation/Hospital based                                             Will tolerate 3hrs Intensive Rehab Daily                                       [  x  ]  Short Term Rehab in Skilled Nursing Facility / assisted living fasc                                       [    ]  Home with Outpatient or VN services                                         [    ]  Possible Candidate for Intensive Hospital based Rehab

## 2019-06-08 LAB
ANION GAP SERPL CALC-SCNC: 13 MMOL/L — SIGNIFICANT CHANGE UP (ref 7–14)
BASOPHILS # BLD AUTO: 0.05 K/UL — SIGNIFICANT CHANGE UP (ref 0–0.2)
BASOPHILS NFR BLD AUTO: 0.4 % — SIGNIFICANT CHANGE UP (ref 0–1)
BUN SERPL-MCNC: 22 MG/DL — HIGH (ref 10–20)
CALCIUM SERPL-MCNC: 8.4 MG/DL — LOW (ref 8.5–10.1)
CHLORIDE SERPL-SCNC: 100 MMOL/L — SIGNIFICANT CHANGE UP (ref 98–110)
CO2 SERPL-SCNC: 20 MMOL/L — SIGNIFICANT CHANGE UP (ref 17–32)
CREAT SERPL-MCNC: 1 MG/DL — SIGNIFICANT CHANGE UP (ref 0.7–1.5)
EOSINOPHIL # BLD AUTO: 0.1 K/UL — SIGNIFICANT CHANGE UP (ref 0–0.7)
EOSINOPHIL NFR BLD AUTO: 0.7 % — SIGNIFICANT CHANGE UP (ref 0–8)
GLUCOSE SERPL-MCNC: 126 MG/DL — HIGH (ref 70–99)
HCT VFR BLD CALC: 40.8 % — LOW (ref 42–52)
HGB BLD-MCNC: 13.9 G/DL — LOW (ref 14–18)
IMM GRANULOCYTES NFR BLD AUTO: 1.2 % — HIGH (ref 0.1–0.3)
LYMPHOCYTES # BLD AUTO: 1.71 K/UL — SIGNIFICANT CHANGE UP (ref 1.2–3.4)
LYMPHOCYTES # BLD AUTO: 12.2 % — LOW (ref 20.5–51.1)
MAGNESIUM SERPL-MCNC: 2.1 MG/DL — SIGNIFICANT CHANGE UP (ref 1.8–2.4)
MCHC RBC-ENTMCNC: 29.6 PG — SIGNIFICANT CHANGE UP (ref 27–31)
MCHC RBC-ENTMCNC: 34.1 G/DL — SIGNIFICANT CHANGE UP (ref 32–37)
MCV RBC AUTO: 86.8 FL — SIGNIFICANT CHANGE UP (ref 80–94)
MONOCYTES # BLD AUTO: 1.38 K/UL — HIGH (ref 0.1–0.6)
MONOCYTES NFR BLD AUTO: 9.9 % — HIGH (ref 1.7–9.3)
NEUTROPHILS # BLD AUTO: 10.57 K/UL — HIGH (ref 1.4–6.5)
NEUTROPHILS NFR BLD AUTO: 75.6 % — HIGH (ref 42.2–75.2)
NRBC # BLD: 0 /100 WBCS — SIGNIFICANT CHANGE UP (ref 0–0)
PHOSPHATE SERPL-MCNC: 3.7 MG/DL — SIGNIFICANT CHANGE UP (ref 2.1–4.9)
PLATELET # BLD AUTO: 212 K/UL — SIGNIFICANT CHANGE UP (ref 130–400)
POTASSIUM SERPL-MCNC: 3.7 MMOL/L — SIGNIFICANT CHANGE UP (ref 3.5–5)
POTASSIUM SERPL-SCNC: 3.7 MMOL/L — SIGNIFICANT CHANGE UP (ref 3.5–5)
RBC # BLD: 4.7 M/UL — SIGNIFICANT CHANGE UP (ref 4.7–6.1)
RBC # FLD: 13.8 % — SIGNIFICANT CHANGE UP (ref 11.5–14.5)
SODIUM SERPL-SCNC: 133 MMOL/L — LOW (ref 135–146)
WBC # BLD: 13.98 K/UL — HIGH (ref 4.8–10.8)
WBC # FLD AUTO: 13.98 K/UL — HIGH (ref 4.8–10.8)

## 2019-06-08 PROCEDURE — 99232 SBSQ HOSP IP/OBS MODERATE 35: CPT

## 2019-06-08 RX ADMIN — HEPARIN SODIUM 5000 UNIT(S): 5000 INJECTION INTRAVENOUS; SUBCUTANEOUS at 05:53

## 2019-06-08 RX ADMIN — Medication 650 MILLIGRAM(S): at 12:02

## 2019-06-08 RX ADMIN — AMLODIPINE BESYLATE 2.5 MILLIGRAM(S): 2.5 TABLET ORAL at 05:53

## 2019-06-08 RX ADMIN — TAMSULOSIN HYDROCHLORIDE 0.4 MILLIGRAM(S): 0.4 CAPSULE ORAL at 21:13

## 2019-06-08 RX ADMIN — Medication 650 MILLIGRAM(S): at 00:50

## 2019-06-08 RX ADMIN — LEVETIRACETAM 420 MILLIGRAM(S): 250 TABLET, FILM COATED ORAL at 05:53

## 2019-06-08 RX ADMIN — Medication 650 MILLIGRAM(S): at 17:26

## 2019-06-08 RX ADMIN — ATENOLOL 25 MILLIGRAM(S): 25 TABLET ORAL at 05:52

## 2019-06-08 RX ADMIN — HEPARIN SODIUM 5000 UNIT(S): 5000 INJECTION INTRAVENOUS; SUBCUTANEOUS at 21:13

## 2019-06-08 RX ADMIN — MEMANTINE HYDROCHLORIDE 10 MILLIGRAM(S): 10 TABLET ORAL at 12:03

## 2019-06-08 RX ADMIN — FLUOROMETHOLONE 1 DROP(S): 1 SOLUTION/ DROPS OPHTHALMIC at 12:03

## 2019-06-08 RX ADMIN — Medication 650 MILLIGRAM(S): at 05:52

## 2019-06-08 RX ADMIN — PANTOPRAZOLE SODIUM 40 MILLIGRAM(S): 20 TABLET, DELAYED RELEASE ORAL at 05:53

## 2019-06-08 RX ADMIN — ATORVASTATIN CALCIUM 20 MILLIGRAM(S): 80 TABLET, FILM COATED ORAL at 21:13

## 2019-06-08 RX ADMIN — LEVETIRACETAM 420 MILLIGRAM(S): 250 TABLET, FILM COATED ORAL at 17:26

## 2019-06-08 RX ADMIN — Medication 650 MILLIGRAM(S): at 00:23

## 2019-06-08 RX ADMIN — Medication 650 MILLIGRAM(S): at 06:43

## 2019-06-08 RX ADMIN — Medication 650 MILLIGRAM(S): at 23:26

## 2019-06-08 RX ADMIN — Medication 1 DROP(S): at 12:03

## 2019-06-08 RX ADMIN — HEPARIN SODIUM 5000 UNIT(S): 5000 INJECTION INTRAVENOUS; SUBCUTANEOUS at 14:26

## 2019-06-08 NOTE — PROGRESS NOTE ADULT - ASSESSMENT
84 year old male s/p fall with SDH right frontoparietal, scattered SAH, haemorrahagic contusions of the rt temporal lobe     plan:  FU trial of void, de la fuente if he fails  f/u hospitalist for transfer of service   PT/rehab  SW consult   Encourage ambulation   encourage incentive

## 2019-06-08 NOTE — CONSULT NOTE ADULT - SUBJECTIVE AND OBJECTIVE BOX
Neurology consult    ASCENCION LANGLEY84yMale    HPI:  84y m  TRAUMA ACTIVATION LEVEL:  Trauma alert    MECHANISM OF INJURY: s/p unwitnessed fall (+HT, ?LOC, -AC)     [] Blunt               [] MVC	                       [X] Fall	                 [] Pedestrian Struck	     [] Motorcycle    [] Assault                       [] Bicycle collision  [] Sports injury     [] Penetrating    [] Gun Shot Wound    [] Stab Wound    GCS: 	15  E: 4	V: 5	M: 6    HPI:   84y old male, BIBEMS, s/p unwitnessed fall (+HT, ?LOC, -AC); patient states he fell in his house, in his kitchen. No LOC. Noted by daughter to have bruising to head. Daughter called her father this morning states that he was acting strange. He said he was tired and went back to sleep. This was at 10am and very unusual for the pt as per daughter because pt rises early in morning and does not oversleep. Daughter then drove from [place] to her fathers home and noted pt has large contusion to his scalp. Daughter then brought pt to the ED for evaluation No nausea, no vomiting. No seizure activity. No blood thinners. No recent illness, no fever, no chest pain or shortness of breath. No abdominal pain.    PAST MEDICAL & SURGICAL HISTORY:  HTN, HLD, BPH, Glaucoma    Home Meds: Home Medications:  AMLODIPINE   TAB 2.5MG:  (04 Jun 2019 23:38)  ATENOLOL     TAB 25MG:  (04 Jun 2019 23:38)  FLUOROMETHOL ELLA 0.1% OP:  (04 Jun 2019 23:38)  MEMANTINE    TAB HCL 10MG:  (04 Jun 2019 23:38)  ROSUVASTATIN TAB 5MG:  (04 Jun 2019 23:38)  TAMSULOSIN   CAP 0.4MG:  (04 Jun 2019 23:38)  TIMOLOL MAL  SOL 0.5% OP:  (04 Jun 2019 23:38)    Allergies: AllergiesNo Known Allergies    Advanced Directives: Presumed Full Code (05 Jun 2019 00:06)          MEDICATIONS    acetaminophen   Tablet .. 650 milliGRAM(s) Oral every 6 hours  amLODIPine   Tablet 2.5 milliGRAM(s) Oral daily  ATENolol  Tablet 25 milliGRAM(s) Oral daily  atorvastatin 20 milliGRAM(s) Oral at bedtime  chlorhexidine 4% Liquid 1 Application(s) Topical <User Schedule>  fluorometholone 0.1% Suspension 1 Drop(s) Both EYES daily  heparin  Injectable 5000 Unit(s) SubCutaneous every 8 hours  levETIRAcetam  IVPB 500 milliGRAM(s) IV Intermittent every 12 hours  memantine 10 milliGRAM(s) Oral daily  pantoprazole    Tablet 40 milliGRAM(s) Oral before breakfast  tamsulosin 0.4 milliGRAM(s) Oral at bedtime  timolol 0.5% Solution 1 Drop(s) Both EYES daily      PAST MEDICAL & SURGICAL HISTORY:       Family history: No history of dementia, strokes, or seizures   FAMILY HISTORY:    SOCIAL HISTORY --     Allergies    No Known Allergies    Intolerances            Vital Signs Last 24 Hrs  T(C): 37.6 (08 Jun 2019 15:45), Max: 37.6 (08 Jun 2019 15:45)  T(F): 99.7 (08 Jun 2019 15:45), Max: 99.7 (08 Jun 2019 15:45)  HR: 69 (08 Jun 2019 15:45) (69 - 80)  BP: 139/73 (08 Jun 2019 15:45) (139/73 - 197/91)  BP(mean): --  RR: 18 (08 Jun 2019 15:45) (18 - 20)  SpO2: 94% (08 Jun 2019 11:44) (94% - 94%)    SUBDURAL HEMATOMA DUE TO CONCUSSION WITH LOC, SEQUELA;FALL  ^SUBDURAL HEMATOMA DUE TO CONCUSSION WITH LOC, SEQUELA;FALL  Yes  Handoff  MEWS Score  Subdural hematoma due to concussion, with loss of consciousness, sequela  Subdural hematoma due to concussion, with loss of consciousness, sequela  POSS STROKE LAST NIGHT  Subarachnoid hemorrhage, traumatic  Fall      LABS:  CBC Full  -  ( 07 Jun 2019 22:06 )  WBC Count : 11.64 K/uL  RBC Count : 4.36 M/uL  Hemoglobin : 12.7 g/dL  Hematocrit : 38.3 %  Platelet Count - Automated : 183 K/uL    06-07    138  |  100  |  20  ----------------------------<  129<H>  4.0   |  25  |  0.9    Ca    8.6      07 Jun 2019 22:06  Phos  3.1     06-07  Mg     2.2     06-07

## 2019-06-08 NOTE — PROGRESS NOTE ADULT - SUBJECTIVE AND OBJECTIVE BOX
Progress Note: Surgery  Patient: ASCENCION LANGLEY , 84y (1935)Male   MRN: [id number]  Location: [address]  Visit: 06-04-19 Inpatient  Date: 06-08-19 @ 02:04    Procedure/Injury: s/p fall     Events over 24h: EEG showed generalized and right frontotemporal slowing indicative of toxic -metabolic picture, Hospitalist consulted for transfer of service for continued workup. failed TOV overnight      Vitals: T(F): 98.4 (06-08-19 @ 00:00), Max: 98.4 (06-08-19 @ 00:00)  HR: 77 (06-08-19 @ 00:00)  BP: 156/85 (06-08-19 @ 00:00) (139/72 - 159/80)  RR: 20 (06-08-19 @ 00:00)  SpO2: --    Diet: Diet, DASH/TLC:   Sodium & Cholesterol Restricted (06-05-19 @ 08:59)      Bowel function:   Bowel movement []   Flatus []    In:   06-06-19 @ 07:01  -  06-07-19 @ 07:00  --------------------------------------------------------  IN: 100 mL    06-07-19 @ 07:01  -  06-08-19 @ 02:04  --------------------------------------------------------  IN: 480 mL      Out:   06-06-19 @ 07:01  -  06-07-19 @ 07:00  --------------------------------------------------------  OUT:    Indwelling Catheter - Urethral: 2140 mL  Total OUT: 2140 mL      06-07-19 @ 07:01  -  06-08-19 @ 02:04  --------------------------------------------------------  OUT:    Voided: 925 mL  Total OUT: 925 mL        Net:   06-06-19 @ 07:01  -  06-07-19 @ 07:00  --------------------------------------------------------  NET: -2040 mL    06-07-19 @ 07:01  -  06-08-19 @ 02:04  --------------------------------------------------------  NET: -445 mL        PHYSICAL EXAM:  GENERAL: NAD  CHEST/LUNG: Clear to auscultation bilaterally  HEART: Regular rate and rhythm  ABDOMEN: Soft, Nontender, Nondistended;     Medications: [Standing]  acetaminophen   Tablet .. 650 milliGRAM(s) Oral every 6 hours  amLODIPine   Tablet 2.5 milliGRAM(s) Oral daily  ATENolol  Tablet 25 milliGRAM(s) Oral daily  atorvastatin 20 milliGRAM(s) Oral at bedtime  chlorhexidine 4% Liquid 1 Application(s) Topical <User Schedule>  fluorometholone 0.1% Suspension 1 Drop(s) Both EYES daily  heparin  Injectable 5000 Unit(s) SubCutaneous every 8 hours  levETIRAcetam  IVPB 500 milliGRAM(s) IV Intermittent every 12 hours  memantine 10 milliGRAM(s) Oral daily  pantoprazole    Tablet 40 milliGRAM(s) Oral before breakfast  tamsulosin 0.4 milliGRAM(s) Oral at bedtime  timolol 0.5% Solution 1 Drop(s) Both EYES daily    Medications:[PRN]    Labs:                        12.7   11.64 )-----------( 183      ( 07 Jun 2019 22:06 )             38.3     06-07    138  |  100  |  20  ----------------------------<  129<H>  4.0   |  25  |  0.9    Ca    8.6      07 Jun 2019 22:06  Phos  3.1     06-07  Mg     2.2     06-07    Date/Time: 06-08-19 @ 02:04

## 2019-06-08 NOTE — CONSULT NOTE ADULT - ASSESSMENT
Pt seen, examined. Contact is affected by decreased hearing.  Patient denies headache or dizziness at present.  Awake, alert, oriented to himself and month but does know place where he is and year.  Speech fluent, follows simple commands.  EOMI. No facial droop. Mild left pronator drift. Motor 3/5 x 4 extr. DTR +1 b/l. Gait - deferred.    HCT - right fr-temp. SDH, left CPA area bleed, left par-occ ICH, scattered SAH.  Repeat HCT - unchanged; stable  EEG - right temp. epileptiform activity    A/P. 84 y.o. man w/ Hx Dementia, HTN, BPH, unsteady gait/falls had unwitnessed fall on Tue 6/4/19 followed by MS changes.   Brought to [facility], found to have above mentioned HCT changes.   Seen by NS - no intervention recommended;   Started on Keppra 500 mg BID for seizure prophylaxis. No seizure-like activity reported.    MS changes - due to multiple intracranial bleeds upon underlying Dementia  Left arm weakness - likely due to right fronto-temporal SDH   Neurologically stable for Rehab (PT eval pend)    - EEG abnormalities are c/w Head CT findings  (right fr-temp. SDH) and are kind of antisipated  - please, d/w Neurosurgery whether any more follow up Head CT need to be done before patient's d/c to rehab; follow w/ NS as outpt  - cont. Keppra 500 mg BID (can be given PO if pt passed speech/swallow eval)  - follow w Neurology as outpt in 4 weeks to re-evaluate whether he still needs Keppra and for Brain MRI to exclude underlying brain lesion(s) (now cannot be seen because of bleed  Discussed with patient's daughter and other members of his family at bedside  Please, call if any questions

## 2019-06-09 LAB
ANION GAP SERPL CALC-SCNC: 17 MMOL/L — HIGH (ref 7–14)
BASOPHILS # BLD AUTO: 0.05 K/UL — SIGNIFICANT CHANGE UP (ref 0–0.2)
BASOPHILS NFR BLD AUTO: 0.3 % — SIGNIFICANT CHANGE UP (ref 0–1)
BUN SERPL-MCNC: 36 MG/DL — HIGH (ref 10–20)
CALCIUM SERPL-MCNC: 8.5 MG/DL — SIGNIFICANT CHANGE UP (ref 8.5–10.1)
CHLORIDE SERPL-SCNC: 99 MMOL/L — SIGNIFICANT CHANGE UP (ref 98–110)
CO2 SERPL-SCNC: 19 MMOL/L — SIGNIFICANT CHANGE UP (ref 17–32)
CREAT SERPL-MCNC: 1.7 MG/DL — HIGH (ref 0.7–1.5)
EOSINOPHIL # BLD AUTO: 0.09 K/UL — SIGNIFICANT CHANGE UP (ref 0–0.7)
EOSINOPHIL NFR BLD AUTO: 0.6 % — SIGNIFICANT CHANGE UP (ref 0–8)
GLUCOSE SERPL-MCNC: 145 MG/DL — HIGH (ref 70–99)
HCT VFR BLD CALC: 42.2 % — SIGNIFICANT CHANGE UP (ref 42–52)
HGB BLD-MCNC: 14 G/DL — SIGNIFICANT CHANGE UP (ref 14–18)
IMM GRANULOCYTES NFR BLD AUTO: 1 % — HIGH (ref 0.1–0.3)
LYMPHOCYTES # BLD AUTO: 1.21 K/UL — SIGNIFICANT CHANGE UP (ref 1.2–3.4)
LYMPHOCYTES # BLD AUTO: 7.7 % — LOW (ref 20.5–51.1)
MAGNESIUM SERPL-MCNC: 2.2 MG/DL — SIGNIFICANT CHANGE UP (ref 1.8–2.4)
MCHC RBC-ENTMCNC: 29 PG — SIGNIFICANT CHANGE UP (ref 27–31)
MCHC RBC-ENTMCNC: 33.2 G/DL — SIGNIFICANT CHANGE UP (ref 32–37)
MCV RBC AUTO: 87.6 FL — SIGNIFICANT CHANGE UP (ref 80–94)
MONOCYTES # BLD AUTO: 1.33 K/UL — HIGH (ref 0.1–0.6)
MONOCYTES NFR BLD AUTO: 8.5 % — SIGNIFICANT CHANGE UP (ref 1.7–9.3)
NEUTROPHILS # BLD AUTO: 12.79 K/UL — HIGH (ref 1.4–6.5)
NEUTROPHILS NFR BLD AUTO: 81.9 % — HIGH (ref 42.2–75.2)
NRBC # BLD: 0 /100 WBCS — SIGNIFICANT CHANGE UP (ref 0–0)
PHOSPHATE SERPL-MCNC: 5.2 MG/DL — HIGH (ref 2.1–4.9)
PLATELET # BLD AUTO: 226 K/UL — SIGNIFICANT CHANGE UP (ref 130–400)
POTASSIUM SERPL-MCNC: 4.2 MMOL/L — SIGNIFICANT CHANGE UP (ref 3.5–5)
POTASSIUM SERPL-SCNC: 4.2 MMOL/L — SIGNIFICANT CHANGE UP (ref 3.5–5)
RBC # BLD: 4.82 M/UL — SIGNIFICANT CHANGE UP (ref 4.7–6.1)
RBC # FLD: 13.9 % — SIGNIFICANT CHANGE UP (ref 11.5–14.5)
SODIUM SERPL-SCNC: 135 MMOL/L — SIGNIFICANT CHANGE UP (ref 135–146)
WBC # BLD: 15.63 K/UL — HIGH (ref 4.8–10.8)
WBC # FLD AUTO: 15.63 K/UL — HIGH (ref 4.8–10.8)

## 2019-06-09 PROCEDURE — 99231 SBSQ HOSP IP/OBS SF/LOW 25: CPT

## 2019-06-09 RX ORDER — SODIUM CHLORIDE 9 MG/ML
1000 INJECTION INTRAMUSCULAR; INTRAVENOUS; SUBCUTANEOUS
Refills: 0 | Status: DISCONTINUED | OUTPATIENT
Start: 2019-06-09 | End: 2019-06-12

## 2019-06-09 RX ORDER — POTASSIUM CHLORIDE 20 MEQ
20 PACKET (EA) ORAL ONCE
Refills: 0 | Status: COMPLETED | OUTPATIENT
Start: 2019-06-09 | End: 2019-06-09

## 2019-06-09 RX ADMIN — Medication 650 MILLIGRAM(S): at 23:05

## 2019-06-09 RX ADMIN — LEVETIRACETAM 420 MILLIGRAM(S): 250 TABLET, FILM COATED ORAL at 17:24

## 2019-06-09 RX ADMIN — Medication 650 MILLIGRAM(S): at 05:19

## 2019-06-09 RX ADMIN — ATORVASTATIN CALCIUM 20 MILLIGRAM(S): 80 TABLET, FILM COATED ORAL at 21:36

## 2019-06-09 RX ADMIN — HEPARIN SODIUM 5000 UNIT(S): 5000 INJECTION INTRAVENOUS; SUBCUTANEOUS at 21:36

## 2019-06-09 RX ADMIN — HEPARIN SODIUM 5000 UNIT(S): 5000 INJECTION INTRAVENOUS; SUBCUTANEOUS at 14:08

## 2019-06-09 RX ADMIN — Medication 650 MILLIGRAM(S): at 17:24

## 2019-06-09 RX ADMIN — Medication 20 MILLIEQUIVALENT(S): at 11:42

## 2019-06-09 RX ADMIN — CHLORHEXIDINE GLUCONATE 1 APPLICATION(S): 213 SOLUTION TOPICAL at 05:30

## 2019-06-09 RX ADMIN — Medication 650 MILLIGRAM(S): at 11:43

## 2019-06-09 RX ADMIN — PANTOPRAZOLE SODIUM 40 MILLIGRAM(S): 20 TABLET, DELAYED RELEASE ORAL at 05:20

## 2019-06-09 RX ADMIN — LEVETIRACETAM 420 MILLIGRAM(S): 250 TABLET, FILM COATED ORAL at 05:19

## 2019-06-09 RX ADMIN — MEMANTINE HYDROCHLORIDE 10 MILLIGRAM(S): 10 TABLET ORAL at 11:43

## 2019-06-09 RX ADMIN — TAMSULOSIN HYDROCHLORIDE 0.4 MILLIGRAM(S): 0.4 CAPSULE ORAL at 21:36

## 2019-06-09 RX ADMIN — ATENOLOL 25 MILLIGRAM(S): 25 TABLET ORAL at 05:19

## 2019-06-09 RX ADMIN — AMLODIPINE BESYLATE 2.5 MILLIGRAM(S): 2.5 TABLET ORAL at 05:19

## 2019-06-09 RX ADMIN — Medication 650 MILLIGRAM(S): at 05:31

## 2019-06-09 RX ADMIN — FLUOROMETHOLONE 1 DROP(S): 1 SOLUTION/ DROPS OPHTHALMIC at 11:43

## 2019-06-09 RX ADMIN — HEPARIN SODIUM 5000 UNIT(S): 5000 INJECTION INTRAVENOUS; SUBCUTANEOUS at 05:19

## 2019-06-09 RX ADMIN — Medication 1 DROP(S): at 11:43

## 2019-06-09 NOTE — PHYSICAL THERAPY INITIAL EVALUATION ADULT - SPECIFY REASON(S)
Pt is lethargic in bed, upon arousal patient is agitated and presents with confusion. Not willing to participate Will f/u.

## 2019-06-09 NOTE — PROGRESS NOTE ADULT - ASSESSMENT
A/P:  ASCENCION LANGLEY is a 84yMale HD5 s/p fall, +HT, ?LOC    Plan:   -no further trauma workup at this time  -f/u SNF placement  -pain control  -regular diet  -dispo planning

## 2019-06-09 NOTE — PROGRESS NOTE ADULT - SUBJECTIVE AND OBJECTIVE BOX
GENERAL SURGERY PROGRESS NOTE     PAMELAPALMIRA ASCENCION  [address] [place] [address] day :5d  Surgical Attending: Herrera Monzon  Overnight events: No acute events o/n.  Patient remains afebrile, HDS.    T(F): 97.1 (06-08-19 @ 23:00), Max: 99.7 (06-08-19 @ 15:45)  HR: 65 (06-09-19 @ 08:48) (65 - 80)  BP: 132/74 (06-09-19 @ 08:48) (122/68 - 190/87)  ABP: --  ABP(mean): --  RR: 18 (06-09-19 @ 08:48) (18 - 18)  SpO2: 94% (06-09-19 @ 08:51) (88% - 95%)      06-08-19 @ 07:01  -  06-09-19 @ 07:00  --------------------------------------------------------  IN:    Oral Fluid: 520 mL  Total IN: 520 mL    OUT:    Voided: 150 mL  Total OUT: 150 mL    Total NET: 370 mL       GI proph:  pantoprazole    Tablet 40 milliGRAM(s) Oral before breakfast    AC/ proph: heparin  Injectable 5000 Unit(s) SubCutaneous every 8 hours    ABx:     PHYSICAL EXAM:  GENERAL: NAD, well-appearing  CHEST/LUNG: Clear to auscultation bilaterally  HEART: Regular rate and rhythm  ABDOMEN: Soft, Nontender, Nondistended;         LABS  Labs:  CAPILLARY BLOOD GLUCOSE                              13.9   13.98 )-----------( 212      ( 08 Jun 2019 20:46 )             40.8       Auto Neutrophil %: 75.6 % (06-08-19 @ 20:46)  Auto Immature Granulocyte %: 1.2 % (06-08-19 @ 20:46)    06-08    133<L>  |  100  |  22<H>  ----------------------------<  126<H>  3.7   |  20  |  1.0      Calcium, Total Serum: 8.4 mg/dL (06-08-19 @ 20:46)

## 2019-06-10 LAB
AMMONIA BLD-MCNC: 30 UMOL/L — SIGNIFICANT CHANGE UP (ref 11–55)
ANION GAP SERPL CALC-SCNC: 15 MMOL/L — HIGH (ref 7–14)
BASOPHILS # BLD AUTO: 0.02 K/UL — SIGNIFICANT CHANGE UP (ref 0–0.2)
BASOPHILS # BLD AUTO: 0.04 K/UL — SIGNIFICANT CHANGE UP (ref 0–0.2)
BASOPHILS NFR BLD AUTO: 0.1 % — SIGNIFICANT CHANGE UP (ref 0–1)
BASOPHILS NFR BLD AUTO: 0.3 % — SIGNIFICANT CHANGE UP (ref 0–1)
BUN SERPL-MCNC: 44 MG/DL — HIGH (ref 10–20)
CALCIUM SERPL-MCNC: 8.1 MG/DL — LOW (ref 8.5–10.1)
CHLORIDE SERPL-SCNC: 105 MMOL/L — SIGNIFICANT CHANGE UP (ref 98–110)
CO2 SERPL-SCNC: 19 MMOL/L — SIGNIFICANT CHANGE UP (ref 17–32)
CREAT SERPL-MCNC: 2 MG/DL — HIGH (ref 0.7–1.5)
EOSINOPHIL # BLD AUTO: 0.08 K/UL — SIGNIFICANT CHANGE UP (ref 0–0.7)
EOSINOPHIL # BLD AUTO: 0.15 K/UL — SIGNIFICANT CHANGE UP (ref 0–0.7)
EOSINOPHIL NFR BLD AUTO: 0.5 % — SIGNIFICANT CHANGE UP (ref 0–8)
EOSINOPHIL NFR BLD AUTO: 1 % — SIGNIFICANT CHANGE UP (ref 0–8)
GAS PNL BLDA: SIGNIFICANT CHANGE UP
GLUCOSE BLDC GLUCOMTR-MCNC: 172 MG/DL — HIGH (ref 70–99)
GLUCOSE BLDC GLUCOMTR-MCNC: 185 MG/DL — HIGH (ref 70–99)
GLUCOSE SERPL-MCNC: 109 MG/DL — HIGH (ref 70–99)
HCT VFR BLD CALC: 38.2 % — LOW (ref 42–52)
HCT VFR BLD CALC: 41 % — LOW (ref 42–52)
HGB BLD-MCNC: 12.6 G/DL — LOW (ref 14–18)
HGB BLD-MCNC: 13.6 G/DL — LOW (ref 14–18)
IMM GRANULOCYTES NFR BLD AUTO: 1.1 % — HIGH (ref 0.1–0.3)
IMM GRANULOCYTES NFR BLD AUTO: 1.4 % — HIGH (ref 0.1–0.3)
LACTATE SERPL-SCNC: 1.2 MMOL/L — SIGNIFICANT CHANGE UP (ref 0.5–2.2)
LYMPHOCYTES # BLD AUTO: 1.25 K/UL — SIGNIFICANT CHANGE UP (ref 1.2–3.4)
LYMPHOCYTES # BLD AUTO: 1.27 K/UL — SIGNIFICANT CHANGE UP (ref 1.2–3.4)
LYMPHOCYTES # BLD AUTO: 7.9 % — LOW (ref 20.5–51.1)
LYMPHOCYTES # BLD AUTO: 8.6 % — LOW (ref 20.5–51.1)
MAGNESIUM SERPL-MCNC: 2.6 MG/DL — HIGH (ref 1.8–2.4)
MCHC RBC-ENTMCNC: 28.8 PG — SIGNIFICANT CHANGE UP (ref 27–31)
MCHC RBC-ENTMCNC: 29.1 PG — SIGNIFICANT CHANGE UP (ref 27–31)
MCHC RBC-ENTMCNC: 33 G/DL — SIGNIFICANT CHANGE UP (ref 32–37)
MCHC RBC-ENTMCNC: 33.2 G/DL — SIGNIFICANT CHANGE UP (ref 32–37)
MCV RBC AUTO: 87.4 FL — SIGNIFICANT CHANGE UP (ref 80–94)
MCV RBC AUTO: 87.8 FL — SIGNIFICANT CHANGE UP (ref 80–94)
MONOCYTES # BLD AUTO: 1.4 K/UL — HIGH (ref 0.1–0.6)
MONOCYTES # BLD AUTO: 1.46 K/UL — HIGH (ref 0.1–0.6)
MONOCYTES NFR BLD AUTO: 9.2 % — SIGNIFICANT CHANGE UP (ref 1.7–9.3)
MONOCYTES NFR BLD AUTO: 9.5 % — HIGH (ref 1.7–9.3)
NEUTROPHILS # BLD AUTO: 11.64 K/UL — HIGH (ref 1.4–6.5)
NEUTROPHILS # BLD AUTO: 12.89 K/UL — HIGH (ref 1.4–6.5)
NEUTROPHILS NFR BLD AUTO: 79.4 % — HIGH (ref 42.2–75.2)
NEUTROPHILS NFR BLD AUTO: 81 % — HIGH (ref 42.2–75.2)
NRBC # BLD: 0 /100 WBCS — SIGNIFICANT CHANGE UP (ref 0–0)
NRBC # BLD: 0 /100 WBCS — SIGNIFICANT CHANGE UP (ref 0–0)
PHOSPHATE SERPL-MCNC: 6.4 MG/DL — HIGH (ref 2.1–4.9)
PLATELET # BLD AUTO: 211 K/UL — SIGNIFICANT CHANGE UP (ref 130–400)
PLATELET # BLD AUTO: 216 K/UL — SIGNIFICANT CHANGE UP (ref 130–400)
POTASSIUM SERPL-MCNC: 4.4 MMOL/L — SIGNIFICANT CHANGE UP (ref 3.5–5)
POTASSIUM SERPL-SCNC: 4.4 MMOL/L — SIGNIFICANT CHANGE UP (ref 3.5–5)
RBC # BLD: 4.37 M/UL — LOW (ref 4.7–6.1)
RBC # BLD: 4.67 M/UL — LOW (ref 4.7–6.1)
RBC # FLD: 14.2 % — SIGNIFICANT CHANGE UP (ref 11.5–14.5)
RBC # FLD: 14.2 % — SIGNIFICANT CHANGE UP (ref 11.5–14.5)
SODIUM SERPL-SCNC: 139 MMOL/L — SIGNIFICANT CHANGE UP (ref 135–146)
WBC # BLD: 14.69 K/UL — HIGH (ref 4.8–10.8)
WBC # BLD: 15.9 K/UL — HIGH (ref 4.8–10.8)
WBC # FLD AUTO: 14.69 K/UL — HIGH (ref 4.8–10.8)
WBC # FLD AUTO: 15.9 K/UL — HIGH (ref 4.8–10.8)

## 2019-06-10 PROCEDURE — 71045 X-RAY EXAM CHEST 1 VIEW: CPT | Mod: 26

## 2019-06-10 PROCEDURE — 70450 CT HEAD/BRAIN W/O DYE: CPT | Mod: 26

## 2019-06-10 PROCEDURE — 99231 SBSQ HOSP IP/OBS SF/LOW 25: CPT

## 2019-06-10 PROCEDURE — 76770 US EXAM ABDO BACK WALL COMP: CPT | Mod: 26

## 2019-06-10 PROCEDURE — 99233 SBSQ HOSP IP/OBS HIGH 50: CPT

## 2019-06-10 RX ORDER — LEVETIRACETAM 250 MG/1
500 TABLET, FILM COATED ORAL
Refills: 0 | Status: DISCONTINUED | OUTPATIENT
Start: 2019-06-10 | End: 2019-06-10

## 2019-06-10 RX ORDER — ACETAMINOPHEN 500 MG
650 TABLET ORAL ONCE
Refills: 0 | Status: COMPLETED | OUTPATIENT
Start: 2019-06-10 | End: 2019-06-10

## 2019-06-10 RX ORDER — LEVETIRACETAM 250 MG/1
500 TABLET, FILM COATED ORAL EVERY 12 HOURS
Refills: 0 | Status: DISCONTINUED | OUTPATIENT
Start: 2019-06-10 | End: 2019-06-14

## 2019-06-10 RX ADMIN — ATENOLOL 25 MILLIGRAM(S): 25 TABLET ORAL at 05:05

## 2019-06-10 RX ADMIN — HEPARIN SODIUM 5000 UNIT(S): 5000 INJECTION INTRAVENOUS; SUBCUTANEOUS at 14:20

## 2019-06-10 RX ADMIN — Medication 650 MILLIGRAM(S): at 05:05

## 2019-06-10 RX ADMIN — TAMSULOSIN HYDROCHLORIDE 0.4 MILLIGRAM(S): 0.4 CAPSULE ORAL at 21:17

## 2019-06-10 RX ADMIN — Medication 650 MILLIGRAM(S): at 23:12

## 2019-06-10 RX ADMIN — LEVETIRACETAM 420 MILLIGRAM(S): 250 TABLET, FILM COATED ORAL at 05:05

## 2019-06-10 RX ADMIN — Medication 1 DROP(S): at 12:36

## 2019-06-10 RX ADMIN — Medication 650 MILLIGRAM(S): at 05:06

## 2019-06-10 RX ADMIN — SODIUM CHLORIDE 75 MILLILITER(S): 9 INJECTION INTRAMUSCULAR; INTRAVENOUS; SUBCUTANEOUS at 17:20

## 2019-06-10 RX ADMIN — MEMANTINE HYDROCHLORIDE 10 MILLIGRAM(S): 10 TABLET ORAL at 12:35

## 2019-06-10 RX ADMIN — HEPARIN SODIUM 5000 UNIT(S): 5000 INJECTION INTRAVENOUS; SUBCUTANEOUS at 05:05

## 2019-06-10 RX ADMIN — Medication 650 MILLIGRAM(S): at 14:25

## 2019-06-10 RX ADMIN — FLUOROMETHOLONE 1 DROP(S): 1 SOLUTION/ DROPS OPHTHALMIC at 12:36

## 2019-06-10 RX ADMIN — Medication 650 MILLIGRAM(S): at 17:22

## 2019-06-10 RX ADMIN — SODIUM CHLORIDE 75 MILLILITER(S): 9 INJECTION INTRAMUSCULAR; INTRAVENOUS; SUBCUTANEOUS at 00:06

## 2019-06-10 RX ADMIN — Medication 650 MILLIGRAM(S): at 23:13

## 2019-06-10 RX ADMIN — LEVETIRACETAM 420 MILLIGRAM(S): 250 TABLET, FILM COATED ORAL at 17:29

## 2019-06-10 RX ADMIN — AMLODIPINE BESYLATE 2.5 MILLIGRAM(S): 2.5 TABLET ORAL at 05:05

## 2019-06-10 RX ADMIN — PANTOPRAZOLE SODIUM 40 MILLIGRAM(S): 20 TABLET, DELAYED RELEASE ORAL at 05:05

## 2019-06-10 RX ADMIN — ATORVASTATIN CALCIUM 20 MILLIGRAM(S): 80 TABLET, FILM COATED ORAL at 21:17

## 2019-06-10 RX ADMIN — Medication 650 MILLIGRAM(S): at 12:35

## 2019-06-10 NOTE — PROGRESS NOTE ADULT - SUBJECTIVE AND OBJECTIVE BOX
Called regarding pt with MS change and increase in bleeding on Head CT. Pt earlier was rapid response due to unresponsiveness. Stat HCT showed < from: CT Head No Cont (06.10.19 @ 16:00) >  1.  Interval enlargement of a right frontoparietal hemorrhagic contusion   with increased mass effect upon the anterior horn of the right lateral   ventricle. There is new right to left midline shift of approximately 5 mm.    2.  Slight interval increase in size of a small amount of layering blood  within the lateral ventricles.    3.  Stable subdural hematomas over the right frontal temporal convexity   and left cerebellopontine angle cistern.    4.  Overall stable appearance of subarachnoid hemorrhage within the right   frontal and temporallobe sulci.    5.  No definite evidence of new parenchymal infarction.    6.  Stable left occipital extra-axial scalp hematoma. Decreased soft   tissue edema within the left parietal scalp. Stable mild diastasis of the   left lambdoid suture.    < end of copied text >    On exam, pt is awake and alert. Following commands, A&Ox1 person only. POWERS - good strength, follows complex commands i.e.. gives thumbs up bilaterally.    Case D/W Dr. Knapp. No Neurosurgical intervention at this time. Pt is also DNR/DNI. Recommend Neurology eval to rule out seizure

## 2019-06-10 NOTE — PROVIDER CONTACT NOTE (OTHER) - ASSESSMENT
pt in bed sleeping. trying to slide to the bottom of the bed with a rock and roll belt on.
/81 OK 72 RR25 O2 sat 95% on 2L

## 2019-06-10 NOTE — PROGRESS NOTE ADULT - ASSESSMENT
85 YO M w/PMHx of Dementia, unsteady gait/falls, HTN, DLD, BPH, Glaucoma, was  BIBEMS, after unwitnessed fall (+HT, -LOC, -AC) and was found to have subdural/subarachnoid hemorrhage, parietal/occipital extracalvarial hematoma and hemorrhagic of the frontal, temporal and cerebellum. Hospital course was complicated by worsening leukocytosis, mental status and ALPHONSO w/Cr (0.8 to 1.7), transferred to medicine for medial optimization.        #S/P unwitnessed fall/SDH R frontotemporal/scattered SAH/small layering IVH/hemorrhagic contusion of R. frontal, temporal lobes, left cerebellum/nondisplaced diastatic fracture:  - Neurosx signed off, no interventions  - Trauma sx on board, no additional   - Worsening mental status: repeat CT head stat    #AMS: worse  - Stat CT head  - Monitor vitals  - f/u labs    #ALPHONSO worsening  - Cr 2.0  - Bladder scan stat  - US Kidneys/Bladder  - Cont IV hydration  - Strict Is&Os, Weight QD  - Avoid nephrotoxic meds    #Leukocytosis unknown etiology  - No clear signs of infection  - Sepsis w/u  - CXR    #BPH  - Cont Tamsulosin 0.4 qhs    #HTN: controlled  - Cont Amlodipine/Atenolol    #DLD  - Cont Lipitor    #DVT ppx: heparin  #Activity: increase as tolerated  #Diet: DASH/TLC  #Dispo: Acute  #DNR/DNI 85 YO M w/PMHx of Dementia, unsteady gait/falls, HTN, DLD, BPH, Glaucoma, was  BIBEMS, after unwitnessed fall (+HT, -LOC, -AC) and was found to have subdural/subarachnoid hemorrhage, parietal/occipital extracalvarial hematoma and hemorrhagic of the frontal, temporal and cerebellum. Hospital course was complicated by worsening leukocytosis, mental status and ALPHONSO w/Cr (0.8 to 1.7), transferred to medicine for medial optimization.        #S/P unwitnessed fall/SDH R frontotemporal/scattered SAH/small layering IVH/hemorrhagic contusion of R. frontal, temporal lobes, left cerebellum/nondisplaced diastatic fracture:  - Neurosx signed off, no interventions  - Neuro: cont keppra, f/u outpatient in 4 weeks  - Trauma sx on board, no additional   - Worsening mental status: repeat CT head stat    #AMS: worse  - Stat CT head  - Monitor vitals  - f/u labs    #ALPHONSO worsening  - Cr 2.0  - Bladder scan stat  - US Kidneys/Bladder  - Cont IV hydration  - Strict Is&Os, Weight QD  - Avoid nephrotoxic meds    #Leukocytosis unknown etiology  - No clear signs of infection  - Sepsis w/u  - CXR    #BPH  - Cont Tamsulosin 0.4 qhs    #HTN: controlled  - Cont Amlodipine/Atenolol    #DLD  - Cont Lipitor    #Dementia:   - Cont current meds    #DVT ppx: heparin  #Activity: increase as tolerated  #Diet: DASH/TLC  #Dispo: Acute  #DNR/DNI 85 YO M w/PMHx of Dementia, unsteady gait/falls, HTN, DLD, BPH, Glaucoma, was  BIBEMS, after unwitnessed fall (+HT, -LOC, -AC) and was found to have subdural/subarachnoid hemorrhage, parietal/occipital extracalvarial hematoma and hemorrhagic of the frontal, temporal and cerebellum. Hospital course was complicated by worsening leukocytosis, mental status and ALPHONSO w/Cr (0.8 to 1.7), transferred to medicine for medial optimization.      Rapid response was called for unresponsiveness, vitals were normal and pt was started on O2 by NC and sent for stat CT head: worsening intraparencymal hemorrhage w/R to L small shift (difficult to estimate due to pt's curved midline),   increasing mass effect. Pt likely will need stat decompression by Neurosx. Recall neuro sx stat !      #S/P unwitnessed fall/SDH R frontotemporal/scattered SAH/small layering IVH/hemorrhagic contusion of R. frontal, temporal lobes, left cerebellum/nondisplaced diastatic fracture: worsening  - Neurosx signed off, no interventions  - Neuro: cont keppra, f/u outpatient in 4 weeks  - Trauma sx on board, no additional   - Worsening mental status: repeat CT head stat  - EEG: no seizure activity/abnormality in same region as SDH  - Syncope w/u: tele, carotid doppler, Echo (sclerotic aortic valve)  - Physiatry: STR in STR  - PT eval pending improvement  - NeroSx recall stat: Called [phone] but sbd hung up w/o saying a word. I have called back multiple times and its busy  - Medical attending aware    #AMS: worse  - Stat CT head  - Monitor vitals  - f/u labs    #ALPHONSO worsening  - Cr 2.0  - Bladder scan stat  - US Kidneys/Bladder  - Cont IV hydration  - Strict Is&Os, Weight QD  - Avoid nephrotoxic meds    #Leukocytosis unknown etiology  - No clear signs of infection  - Sepsis w/u  - CXR    #BPH  - Cont Tamsulosin 0.4 qhs    #HTN: controlled  - Cont Amlodipine/Atenolol    #DLD  - Cont Lipitor    #Dementia:   - Cont current meds    #DVT ppx: heparin  #Activity: increase as tolerated  #Diet: DASH/TLC  #Dispo: Acute  #DNR/DNI 85 YO M w/PMHx of Dementia, unsteady gait/falls, HTN, DLD, BPH, Glaucoma, was  BIBEMS, after unwitnessed fall (+HT, -LOC, -AC) and was found to have subdural/subarachnoid hemorrhage, parietal/occipital extracalvarial hematoma and hemorrhagic of the frontal, temporal and cerebellum. Hospital course was complicated by worsening leukocytosis, mental status and ALPHONSO w/Cr (0.8 to 1.7), transferred to medicine for medial optimization.      Rapid response was called for unresponsiveness, vitals were normal and pt was started on O2 by NC and sent for stat CT head: worsening intraparencymal hemorrhage w/R to L small shift (difficult to estimate due to pt's curved midline),   increasing mass effect. Pt likely will need stat decompression by Neurosx. Recall neuro sx stat !      #S/P unwitnessed fall/SDH R frontotemporal/scattered SAH/small layering IVH/hemorrhagic contusion of R. frontal, temporal lobes, left cerebellum/nondisplaced diastatic fracture: worsening  - Neurosx signed off, no interventions  - Neuro: cont keppra, f/u outpatient in 4 weeks  - Trauma sx on board, no additional   - Worsening mental status: repeat CT head stat  - EEG: no seizure activity/abnormality in same region as SDH  - Syncope w/u: tele, carotid doppler, Echo (sclerotic aortic valve)  - Physiatry: STR in STR  - PT eval pending improvement  - NeroSx recall stat: No sx intervention, likely due to seizure.  - Neurology recalled for possible seizure      #AMS vs. Seizure: worse, pt unresponsive  - Stat CT head: worse w/inreased bleeding and a new 5 mm midline shift  - Monitor vitals  - f/u labs    #ALPHONSO worsening  - Cr 2.0  - Bladder scan stat  - US Kidneys/Bladder  - Cont IV hydration  - Strict Is&Os, Weight QD  - Avoid nephrotoxic meds  - Nephro apprec, recs noted    #Leukocytosis unknown etiology  - No clear signs of infection  - Sepsis w/u  - CXR    #BPH  - Cont Tamsulosin 0.4 qhs    #HTN: controlled  - Cont Amlodipine/Atenolol    #DLD  - Cont Lipitor    #Dementia:   - Cont current meds    #DVT ppx: heparin  #Activity: increase as tolerated  #Diet: DASH/TLC  #Dispo: Acute  #DNR/DNI

## 2019-06-10 NOTE — PROGRESS NOTE ADULT - SUBJECTIVE AND OBJECTIVE BOX
KORINGRAYSONAN  84y Male    INTERVAL HPI/OVERNIGHT EVENTS:    Pt now transferred to medicine for ALPHONSO and leukocytosis.  Case discussed with RN, daughter Amaris at bedside and PGY 1 who assumed care of the pt.  He is now more awake and alert but not talking.  Said 2 phrases today per his daughter.  He was lethargic in the morning.  Ate most of his lunch.  No respiratory distress at this time.  Had BM now - incontinent - soft stool, no diarrhea  He had a wet bed today per RN.    T(F): 98.7 (06-10-19 @ 07:30), Max: 98.7 (06-10-19 @ 07:30)  HR: 68 (06-10-19 @ 07:30) (60 - 72)  BP: 146/83 (06-10-19 @ 07:30) (137/63 - 163/83)  RR: 23 (06-10-19 @ 07:30) (19 - 25)  SpO2: 95% (06-10-19 @ 07:30) (94% - 95%) on 2L NC  I&O's Summary    09 Jun 2019 07:01  -  10 Chato 2019 07:00  --------------------------------------------------------  IN: 1100 mL / OUT: 0 mL / NET: 1100 mL      PHYSICAL EXAM:  GENERAL: NAD in a chair  HEAD:  Normocephalic  EYES:  conjunctiva and sclera clear, PERRL  Ears: left hearing aide, right one was lost  NECK: Supple  NERVOUS SYSTEM:  awake, alert, not talking, denies pain when asked  not following commands  eating lunch with assistance  CHEST/LUNG: Coarse BS b/l  HEART: Regular rate and rhythm  ABDOMEN: distended, firm, NT  EXTREMITIES:   No edema  SKIN: No rashes     Consultant(s) Notes Reviewed:  [x ] YES  [ ] NO  Care Discussed with Consultants/Other Providers [ x] YES  [ ] NO    MEDICATIONS  (STANDING):  acetaminophen   Tablet  650 milliGRAM(s) Oral every 6 hours  amLODIPine   Tablet 2.5 milliGRAM(s) Oral daily  ATENolol  Tablet 25 milliGRAM(s) Oral daily  atorvastatin 20 milliGRAM(s) Oral at bedtime  chlorhexidine 4% Liquid 1 Application(s) Topical <User Schedule>  fluorometholone 0.1% Suspension 1 Drop(s) Both EYES daily  heparin  Injectable 5000 Unit(s) SubCutaneous every 8 hours  levETIRAcetam 500 milliGRAM(s) Oral two times a day  memantine 10 milliGRAM(s) Oral daily  pantoprazole    Tablet 40 milliGRAM(s) Oral before breakfast  sodium chloride 0.9%. 1000 milliLiter(s) (75 mL/Hr) IV Continuous <Continuous>  tamsulosin 0.4 milliGRAM(s) Oral at bedtime  timolol 0.5% Solution 1 Drop(s) Both EYES daily    MEDICATIONS  (PRN):      LABS:                        13.6   15.90 )-----------( 216      ( 10 Chato 2019 11:39 )             41.0     06-10    139  |  105  |  44<H>  ----------------------------<  109<H>  4.4   |  19  |  2.0<H>    Ca    8.1<L>      10 Chato 2019 11:39  Phos  6.4     06-10  Mg     2.6     06-10            Case discussed with resident    Care discussed with pt's family

## 2019-06-10 NOTE — PROGRESS NOTE ADULT - SUBJECTIVE AND OBJECTIVE BOX
DAILY PROGRESS NOTE  ===========================================================    Patient Information:  ASCENCION LANGLEY  /  84y  /  Male  /  MRN#: [id number]    Hospital Day: 6d     |:::::::::::::::::::::::::::| SUBJECTIVE |:::::::::::::::::::::::::::|    OVERNIGHT EVENTS:   TODAY: Patient was seen today at bedside. Review of systems is otherwise negative.    |:::::::::::::::::::::::::::| OBJECTIVE |:::::::::::::::::::::::::::|    VITAL SIGNS: Last 24 Hours  T(C): 37.1 (10 Chato 2019 07:30), Max: 37.1 (10 Chato 2019 07:30)  T(F): 98.7 (10 Chato 2019 07:30), Max: 98.7 (10 Chato 2019 07:30)  HR: 68 (10 Chato 2019 07:30) (60 - 72)  BP: 146/83 (10 Chato 2019 07:30) (137/63 - 163/83)  BP(mean): --  RR: 23 (10 Chato 2019 07:30) (19 - 25)  SpO2: 95% (10 Chato 2019 07:30) (94% - 95%)    06-09-19 @ 07:01  -  06-10-19 @ 07:00  --------------------------------------------------------  IN: 1100 mL / OUT: 0 mL / NET: 1100 mL      PHYSICAL EXAM:  GENERAL: Awake, alert; NAD.  HEENT: Head NC/AT; Conjunctivae clear, Sclera anicteric; Oral mucosa moist.  CARDIO: Regular rate; Regular rhythm; S1 & S2.  RESP: No rales or rhonchi appreciated.  GI: Soft; NT/ND; +BS; No guarding; No rebound tenderness.  EXT: No edema in UE and LE.  NEURO: Nonfocal.  SKIN: No lesions noted.  THIS IS A TEMPLATE    LAB RESULTS:                        13.6   15.90 )-----------( 216      ( 10 Chato 2019 11:39 )             41.0     06-10    139  |  105  |  44<H>  ----------------------------<  109<H>  4.4   |  19  |  2.0<H>    Ca    8.1<L>      10 Chato 2019 11:39  Phos  6.4     06-10  Mg     2.6     06-10                  MICROBIOLOGY:        RADIOLOGY:    CT Head: 06/04/19    1.  Large left parietal/occipital extracalvarial hematoma. Slight   widening of the left lambdoid suture suspicious for nondisplaced   diastatic fracture.    2.  Subdural hemorrhage in the right frontotemporal convexity measuring   up to 8 mm in width and within the left cerebellopontine angle cistern   measuring 8 mm in width.    3.  Hemorrhagic contusions in the right frontal and temporal lobes and   left cerebellum.      4.  Scattered subarachnoid hemorrhage and small layering intraventricular   hemorrhage.    CT Head: 06/05/19    1.  Subdural hemorrhage over the right frontal and temporal convexities   and left cerebellopontine angle cistern, not significantly changed since   the prior study.    2.  Stable right frontal, right temporal and left cerebellar contusions.     3.  No midline shift. Stable ventricular size.    4.  Scattered subarachnoid hemorrhage and small intraventricular   hemorrhage are again noted.     5.  Redemonstration of large left parietal/occipital extracalvarial   hematoma and soft tissue swelling with slight widening of the left   lambdoid suture.    6.  Visualized paranasal sinuses and bilateral mastoid air cells are   well-aerated.          ALLERGIES:  No Known Allergies    HOME MEDICATIONS:  AMLODIPINE   TAB 2.5MG:  (04 Jun 2019 23:38)  ATENOLOL     TAB 25MG:  (04 Jun 2019 23:38)  FLUOROMETHOL ELLA 0.1% OP:  (04 Jun 2019 23:38)  MEMANTINE    TAB HCL 10MG:  (04 Jun 2019 23:38)  ROSUVASTATIN TAB 5MG:  (04 Jun 2019 23:38)  TAMSULOSIN   CAP 0.4MG:  (04 Jun 2019 23:38)  TIMOLOL MAL  SOL 0.5% OP:  (04 Jun 2019 23:38)    =========================================================== DAILY PROGRESS NOTE  ===========================================================    Patient Information:  ASCENCION LANGLEY  /  84y  /  Male  /  MRN#: [id number]    Hospital Day: 6d     |:::::::::::::::::::::::::::| SUBJECTIVE |:::::::::::::::::::::::::::|    OVERNIGHT EVENTS:   TODAY: Patient was seen today at bedside. Pt was lethargic, unresponsive, but voluntarily moving all extremities. He was not responding to questions. Unable to do ROS.    |:::::::::::::::::::::::::::| OBJECTIVE |:::::::::::::::::::::::::::|    VITAL SIGNS: Last 24 Hours  T(C): 37.1 (10 Chato 2019 07:30), Max: 37.1 (10 Chato 2019 07:30)  T(F): 98.7 (10 Chato 2019 07:30), Max: 98.7 (10 Chato 2019 07:30)  HR: 68 (10 Chato 2019 07:30) (60 - 72)  BP: 146/83 (10 Chato 2019 07:30) (137/63 - 163/83)  BP(mean): --  RR: 23 (10 Chato 2019 07:30) (19 - 25)  SpO2: 95% (10 Chato 2019 07:30) (94% - 95%)    06-09-19 @ 07:01  -  06-10-19 @ 07:00  --------------------------------------------------------  IN: 1100 mL / OUT: 0 mL / NET: 1100 mL      PHYSICAL EXAM:  GENERAL: Patient was lying in bed, not responding, moving extremities voluntatily but not quite following commands.  HEENT: Head NC/AT; Conjunctivae clear, Sclera anicteric; Pupils were normal size and reactive to light. Oral mucosa moist.  CARDIO: Regular rate; Regular rhythm; S1 & S2.  RESP: Coarse b/l  GI: Distended, firm; +BS; No guarding; No rebound tenderness.  EXT: No edema in UE and LE.  NEURO: Moving extremities voluntarily but not following command, not really responding, difficult to assess  SKIN: No lesions noted.    LAB RESULTS:                        13.6   15.90 )-----------( 216      ( 10 Chato 2019 11:39 )             41.0     06-10    139  |  105  |  44<H>  ----------------------------<  109<H>  4.4   |  19  |  2.0<H>    Ca    8.1<L>      10 Chato 2019 11:39  Phos  6.4     06-10  Mg     2.6     06-10                  MICROBIOLOGY:        RADIOLOGY:    CT Head: 06/04/19    1.  Large left parietal/occipital extracalvarial hematoma. Slight   widening of the left lambdoid suture suspicious for nondisplaced   diastatic fracture.    2.  Subdural hemorrhage in the right frontotemporal convexity measuring   up to 8 mm in width and within the left cerebellopontine angle cistern   measuring 8 mm in width.    3.  Hemorrhagic contusions in the right frontal and temporal lobes and   left cerebellum.      4.  Scattered subarachnoid hemorrhage and small layering intraventricular   hemorrhage.    CT Head: 06/05/19    1.  Subdural hemorrhage over the right frontal and temporal convexities   and left cerebellopontine angle cistern, not significantly changed since   the prior study.    2.  Stable right frontal, right temporal and left cerebellar contusions.     3.  No midline shift. Stable ventricular size.    4.  Scattered subarachnoid hemorrhage and small intraventricular   hemorrhage are again noted.     5.  Redemonstration of large left parietal/occipital extracalvarial   hematoma and soft tissue swelling with slight widening of the left   lambdoid suture.    6.  Visualized paranasal sinuses and bilateral mastoid air cells are   well-aerated.          ALLERGIES:  No Known Allergies    HOME MEDICATIONS:  AMLODIPINE   TAB 2.5MG:  (04 Jun 2019 23:38)  ATENOLOL     TAB 25MG:  (04 Jun 2019 23:38)  FLUOROMETHOL ELLA 0.1% OP:  (04 Jun 2019 23:38)  MEMANTINE    TAB HCL 10MG:  (04 Jun 2019 23:38)  ROSUVASTATIN TAB 5MG:  (04 Jun 2019 23:38)  TAMSULOSIN   CAP 0.4MG:  (04 Jun 2019 23:38)  TIMOLOL MAL  SOL 0.5% OP:  (04 Jun 2019 23:38)    =========================================================== DAILY PROGRESS NOTE  ===========================================================    Patient Information:  ASCENCION LANGLEY  /  84y  /  Male  /  MRN#: [id number]    Hospital Day: 6d     |:::::::::::::::::::::::::::| SUBJECTIVE |:::::::::::::::::::::::::::|    OVERNIGHT EVENTS:   TODAY: Patient was seen today at bedside. Pt was lethargic, unresponsive, but voluntarily moving all extremities. He was not responding to questions. Unable to do ROS.    |:::::::::::::::::::::::::::| OBJECTIVE |:::::::::::::::::::::::::::|    VITAL SIGNS: Last 24 Hours  T(C): 37.1 (10 Chato 2019 07:30), Max: 37.1 (10 Chato 2019 07:30)  T(F): 98.7 (10 Chato 2019 07:30), Max: 98.7 (10 Chato 2019 07:30)  HR: 68 (10 Chato 2019 07:30) (60 - 72)  BP: 146/83 (10 Chato 2019 07:30) (137/63 - 163/83)  BP(mean): --  RR: 23 (10 Chato 2019 07:30) (19 - 25)  SpO2: 95% (10 Chato 2019 07:30) (94% - 95%)    06-09-19 @ 07:01  -  06-10-19 @ 07:00  --------------------------------------------------------  IN: 1100 mL / OUT: 0 mL / NET: 1100 mL      PHYSICAL EXAM:  GENERAL: Patient was lying in bed, not responding, moving extremities voluntatily but not quite following commands.  HEENT: Head NC/Traumatic/fractures; Conjunctivae clear, Sclera anicteric; Pupils were normal size and reactive to light. Oral mucosa moist.  CARDIO: Regular rate; Regular rhythm; S1 & S2.  RESP: Coarse b/l  GI: Distended, firm; +BS; No guarding; No rebound tenderness.  EXT: No edema in UE and LE.  NEURO: Moving extremities voluntarily but not following command, not really responding, difficult to assess  SKIN: No lesions noted.    LAB RESULTS:                        13.6   15.90 )-----------( 216      ( 10 Chato 2019 11:39 )             41.0     06-10    139  |  105  |  44<H>  ----------------------------<  109<H>  4.4   |  19  |  2.0<H>    Ca    8.1<L>      10 Chato 2019 11:39  Phos  6.4     06-10  Mg     2.6     06-10                  MICROBIOLOGY:        RADIOLOGY:    CT Head: 06/04/19    1.  Large left parietal/occipital extracalvarial hematoma. Slight   widening of the left lambdoid suture suspicious for nondisplaced   diastatic fracture.    2.  Subdural hemorrhage in the right frontotemporal convexity measuring   up to 8 mm in width and within the left cerebellopontine angle cistern   measuring 8 mm in width.    3.  Hemorrhagic contusions in the right frontal and temporal lobes and   left cerebellum.      4.  Scattered subarachnoid hemorrhage and small layering intraventricular   hemorrhage.    CT Head: 06/05/19    1.  Subdural hemorrhage over the right frontal and temporal convexities   and left cerebellopontine angle cistern, not significantly changed since   the prior study.    2.  Stable right frontal, right temporal and left cerebellar contusions.     3.  No midline shift. Stable ventricular size.    4.  Scattered subarachnoid hemorrhage and small intraventricular   hemorrhage are again noted.     5.  Redemonstration of large left parietal/occipital extracalvarial   hematoma and soft tissue swelling with slight widening of the left   lambdoid suture.    6.  Visualized paranasal sinuses and bilateral mastoid air cells are   well-aerated.          ALLERGIES:  No Known Allergies    HOME MEDICATIONS:  AMLODIPINE   TAB 2.5MG:  (04 Jun 2019 23:38)  ATENOLOL     TAB 25MG:  (04 Jun 2019 23:38)  FLUOROMETHOL ELLA 0.1% OP:  (04 Jun 2019 23:38)  MEMANTINE    TAB HCL 10MG:  (04 Jun 2019 23:38)  ROSUVASTATIN TAB 5MG:  (04 Jun 2019 23:38)  TAMSULOSIN   CAP 0.4MG:  (04 Jun 2019 23:38)  TIMOLOL MAL  SOL 0.5% OP:  (04 Jun 2019 23:38)    ===========================================================

## 2019-06-10 NOTE — CONSULT NOTE ADULT - SUBJECTIVE AND OBJECTIVE BOX
NEPHROLOGY CONSULTATION NOTE    Patient lethargic and not answering questions. hx taken from chart.  84y old male, BIBEMS, s/p unwitnessed fall (+HT, ?LOC, -AC); patient states he fell in his house, in his kitchen. No LOC. Noted by daughter to have bruising to head. Daughter called her father this morning states that he was acting strange. He said he was tired and went back to sleep. This was at 10am and very unusual for the pt as per daughter because pt rises early in morning and does not oversleep. Daughter then drove from [place] to her fathers home and noted pt has large contusion to his scalp. Daughter then brought pt to the ED for evaluation No nausea, no vomiting. No seizure activity. No blood thinners. No recent illness, no fever, no chest pain or shortness of breath. No abdominal pain. (19)    Today: as per RN, pt was a bit lethargic and having respiratory distress, started on CPAP. later Pt taken off from CPAP.    PAST MEDICAL & SURGICAL HISTORY:    Allergies:  No Known Allergies    Home Medications:  AMLODIPINE   TAB 2.5MG:  (2019 23:38)  ATENOLOL     TAB 25MG:  (2019 23:38)  FLUOROMETHOL ELLA 0.1% OP:  (2019 23:38)  MEMANTINE    TAB HCL 10MG:  (2019 23:38)  ROSUVASTATIN TAB 5MG:  (2019 23:38)  TAMSULOSIN   CAP 0.4MG:  (2019 23:38)  TIMOLOL MAL  SOL 0.5% OP:  (2019 23:38)    Hospital Medications:   MEDICATIONS  (STANDING):  acetaminophen   Tablet .. 650 milliGRAM(s) Oral every 6 hours  amLODIPine   Tablet 2.5 milliGRAM(s) Oral daily  ATENolol  Tablet 25 milliGRAM(s) Oral daily  atorvastatin 20 milliGRAM(s) Oral at bedtime  chlorhexidine 4% Liquid 1 Application(s) Topical <User Schedule>  fluorometholone 0.1% Suspension 1 Drop(s) Both EYES daily  heparin  Injectable 5000 Unit(s) SubCutaneous every 8 hours  levETIRAcetam 500 milliGRAM(s) Oral two times a day  memantine 10 milliGRAM(s) Oral daily  pantoprazole    Tablet 40 milliGRAM(s) Oral before breakfast  sodium chloride 0.9%. 1000 milliLiter(s) (75 mL/Hr) IV Continuous <Continuous>  tamsulosin 0.4 milliGRAM(s) Oral at bedtime  timolol 0.5% Solution 1 Drop(s) Both EYES daily      SOCIAL HISTORY:  Denies ETOH,Smoking,   FAMILY HISTORY:        REVIEW OF SYSTEMS:    All other review of systems is negative unless indicated above.    VITALS:  T(F): 98.7 (06-10-19 @ 07:30), Max: 98.7 (06-10-19 @ 07:30)  HR: 68 (06-10-19 @ 07:30)  BP: 146/83 (06-10-19 @ 07:30)  RR: 23 (06-10-19 @ 07:30)  SpO2: 95% (06-10-19 @ 07:30)     @ 07:01  -  06-10 @ 07:00  --------------------------------------------------------  IN: 1100 mL / OUT: 0 mL / NET: 1100 mL            I&O's Detail    2019 07:01  -  10 Chato 2019 07:00  --------------------------------------------------------  IN:    Oral Fluid: 500 mL    sodium chloride 0.9%.: 600 mL  Total IN: 1100 mL    OUT:  Total OUT: 0 mL    Total NET: 1100 mL            PHYSICAL EXAM:  Constitutional: NAD  Respiratory: CTAB, no wheezes, rales or rhonchi  Cardiovascular: S1, S2, RRR  Gastrointestinal: BS+, soft, NT/ND  Extremities: No peripheral edema  Neurological: A/O x 3  : No de la fuente.     Vascular Access:    LABS:  06-10    139  |  105  |  44<H>  ----------------------------<  109<H>  4.4   |  19  |  2.0<H>    Ca    8.1<L>      10 Chato 2019 11:39  Phos  6.4     06-10  Mg     2.6     06-10    Albumin, Serum: 4.0 g/dL (19 @ 16:40)    Creatinine Trend: 2.0 <--, 1.7 <--, 1.0 <--, 0.9 <--, 0.8 <--, 1.0 <--, 0.9 <--, 0.9 <--                        13.6   15.90 )-----------( 216      ( 10 Chato 2019 11:39 )             41.0     Blood Gas Profile - Arterial (06.10.19 @ 14:54)    pH, Arterial: 7.46: 50% Venti mask.    pCO2, Arterial: 25 mmHg    pO2, Arterial: 180 mmHg    HCO3, Arterial: 18 mmoL/L    Base Excess, Arterial: -4.4 mmoL/L    Oxygen Saturation, Arterial: 100 %      Urine Studies:  Urinalysis Basic - ( 2019 18:55 )    Color: Yellow / Appearance: Clear / S.020 / pH:   Gluc:  / Ketone: 40  / Bili: Negative / Urobili: 0.2 mg/dL   Blood:  / Protein: 30 mg/dL / Nitrite: Negative   Leuk Esterase: Negative / RBC:  / WBC    Sq Epi:  / Non Sq Epi: Occasional /HPF / Bacteria: Few /HPF      RADIOLOGY & ADDITIONAL STUDIES:  < from: VA Duplex Carotid, Bilat (19 @ 13:56) >  Impression:    20-39% stenosis of the right internal carotid artery.    20-39% stenosis of the left internal carotid artery.    < end of copied text >    < from: Transthoracic Echocardiogram (19 @ 11:02) >  Summary:   1. LV Ejection Fraction by Pal's Method with a biplane EF of 65 %.   2. Mildly increased LV wall thickness.   3. Sclerotic aortic valve with decreased opening.    < end of copied text >    < from: Xray Chest 1 View- PORTABLE-Routine (19 @ 05:45) >  Impression:      No radiographic evidence of acute cardiopulmonary disease.    < end of copied text >    < from: CT Head No Cont (19 @ 02:21) >  IMPRESSION:    1.  Subdural hemorrhage over the right frontal and temporal convexities   and left cerebellopontine angle cistern, not significantly changed since   the prior study.    2.  Stable right frontal, right temporal and left cerebellar contusions.     3.  No midline shift. Stable ventricular size.    4.  Scattered subarachnoid hemorrhage and small intraventricular   hemorrhage are again noted.     5.  Redemonstration of large left parietal/occipital extracalvarial   hematoma and soft tissue swelling with slight widening of the left   lambdoid suture.    6.  Visualized paranasal sinuses and bilateral mastoid air cells are   well-aerated.    < end of copied text > NEPHROLOGY CONSULTATION NOTE    Patient lethargic and not answering questions. hx taken from chart.  84y old male, BIBEMS, s/p unwitnessed fall (+HT, ?LOC, -AC); patient states he fell in his house, in his kitchen. No LOC. Noted by daughter to have bruising to head. Daughter called her father this morning states that he was acting strange. He said he was tired and went back to sleep. This was at 10am and very unusual for the pt as per daughter because pt rises early in morning and does not oversleep. Daughter then drove from [place] to her fathers home and noted pt has large contusion to his scalp. Daughter then brought pt to the ED for evaluation No nausea, no vomiting. No seizure activity. No blood thinners. No recent illness, no fever, no chest pain or shortness of breath. No abdominal pain. (19)    Today: as per RN, pt was a bit lethargic and having respiratory distress, started on CPAP. later Pt taken off from CPAP.    PAST MEDICAL & SURGICAL HISTORY:    Allergies:  No Known Allergies    Home Medications:  AMLODIPINE   TAB 2.5MG:  (2019 23:38)  ATENOLOL     TAB 25MG:  (2019 23:38)  FLUOROMETHOL ELLA 0.1% OP:  (2019 23:38)  MEMANTINE    TAB HCL 10MG:  (2019 23:38)  ROSUVASTATIN TAB 5MG:  (2019 23:38)  TAMSULOSIN   CAP 0.4MG:  (2019 23:38)  TIMOLOL MAL  SOL 0.5% OP:  (2019 23:38)    Hospital Medications:   MEDICATIONS  (STANDING):  acetaminophen   Tablet .. 650 milliGRAM(s) Oral every 6 hours  amLODIPine   Tablet 2.5 milliGRAM(s) Oral daily  ATENolol  Tablet 25 milliGRAM(s) Oral daily  atorvastatin 20 milliGRAM(s) Oral at bedtime  fluorometholone 0.1% Suspension 1 Drop(s) Both EYES daily  heparin  Injectable 5000 Unit(s) SubCutaneous every 8 hours  levETIRAcetam 500 milliGRAM(s) Oral two times a day  memantine 10 milliGRAM(s) Oral daily  pantoprazole    Tablet 40 milliGRAM(s) Oral before breakfast  sodium chloride 0.9%. 1000 milliLiter(s) (75 mL/Hr) IV Continuous <Continuous>  tamsulosin 0.4 milliGRAM(s) Oral at bedtime  timolol 0.5% Solution 1 Drop(s) Both EYES daily      SOCIAL HISTORY:  Denies ETOH,Smoking,   FAMILY HISTORY:        REVIEW OF SYSTEMS:    All other review of systems is negative unless indicated above.    VITALS:  T(F): 98.7 (06-10-19 @ 07:30), Max: 98.7 (06-10-19 @ 07:30)  HR: 68 (06-10-19 @ 07:30)  BP: 146/83 (06-10-19 @ 07:30)  RR: 23 (06-10-19 @ 07:30)  SpO2: 95% (06-10-19 @ 07:30)     @ 07:01  -  06-10 @ 07:00  --------------------------------------------------------  IN: 1100 mL / OUT: 0 mL / NET: 1100 mL            I&O's Detail    2019 07:01  -  10 Chato 2019 07:00  --------------------------------------------------------  IN:    Oral Fluid: 500 mL    sodium chloride 0.9%.: 600 mL  Total IN: 1100 mL    OUT:  Total OUT: 0 mL    Total NET: 1100 mL            PHYSICAL EXAM:  Constitutional: NAD  Respiratory: CTAB, no wheezes, rales or rhonchi  Cardiovascular: S1, S2, RRR  Gastrointestinal: BS+, soft, NT/ND  Extremities: No peripheral edema  Neurological: A/O x 3  : No de la fuente.     Vascular Access:    LABS:  06-10    139  |  105  |  44<H>  ----------------------------<  109<H>  4.4   |  19  |  2.0<H>    Ca    8.1<L>      10 Chato 2019 11:39  Phos  6.4     06-10  Mg     2.6     06-10    Albumin, Serum: 4.0 g/dL (19 @ 16:40)    Creatinine Trend: 2.0 <--, 1.7 <--, 1.0 <--, 0.9 <--, 0.8 <--, 1.0 <--, 0.9 <--, 0.9 <--                        13.6   15.90 )-----------( 216      ( 10 Chato 2019 11:39 )             41.0     Blood Gas Profile - Arterial (06.10.19 @ 14:54)    pH, Arterial: 7.46: 50% Venti mask.    pCO2, Arterial: 25 mmHg    pO2, Arterial: 180 mmHg    HCO3, Arterial: 18 mmoL/L    Base Excess, Arterial: -4.4 mmoL/L    Oxygen Saturation, Arterial: 100 %      Urine Studies:  Urinalysis Basic - ( 2019 18:55 )    Color: Yellow / Appearance: Clear / S.020 / pH:   Gluc:  / Ketone: 40  / Bili: Negative / Urobili: 0.2 mg/dL   Blood:  / Protein: 30 mg/dL / Nitrite: Negative   Leuk Esterase: Negative / RBC:  / WBC    Sq Epi:  / Non Sq Epi: Occasional /HPF / Bacteria: Few /HPF      RADIOLOGY & ADDITIONAL STUDIES:  < from: VA Duplex Carotid, Bilat (19 @ 13:56) >  Impression:    20-39% stenosis of the right internal carotid artery.    20-39% stenosis of the left internal carotid artery.    < end of copied text >    < from: Transthoracic Echocardiogram (19 @ 11:02) >  Summary:   1. LV Ejection Fraction by Pal's Method with a biplane EF of 65 %.   2. Mildly increased LV wall thickness.   3. Sclerotic aortic valve with decreased opening.    < end of copied text >    < from: Xray Chest 1 View- PORTABLE-Routine (19 @ 05:45) >  Impression:      No radiographic evidence of acute cardiopulmonary disease.    < end of copied text >    < from: CT Head No Cont (19 @ 02:21) >  IMPRESSION:    1.  Subdural hemorrhage over the right frontal and temporal convexities   and left cerebellopontine angle cistern, not significantly changed since   the prior study.    2.  Stable right frontal, right temporal and left cerebellar contusions.     3.  No midline shift. Stable ventricular size.    4.  Scattered subarachnoid hemorrhage and small intraventricular   hemorrhage are again noted.     5.  Redemonstration of large left parietal/occipital extracalvarial   hematoma and soft tissue swelling with slight widening of the left   lambdoid suture.    6.  Visualized paranasal sinuses and bilateral mastoid air cells are   well-aerated.    < end of copied text >

## 2019-06-10 NOTE — CONSULT NOTE ADULT - ASSESSMENT
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 Pt not back to baseline for this hospitalization. Neurosurgery is following the patient--no intervention. Medicine team considering palliative care consult. Pt is DNR/DNI    Plan:  continue keppra  keep mag >2  seizure precautions  VEEG

## 2019-06-10 NOTE — CONSULT NOTE ADULT - SUBJECTIVE AND OBJECTIVE BOX
HPI:  84y old male, BIBEMS, s/p unwitnessed fall (+HT, ?LOC, -AC); patient states he fell in his house, in his kitchen. No LOC. Noted by daughter to have bruising to head. Daughter called her father this morning states that he was acting strange. He said he was tired and went back to sleep. This was at 10am and very unusual for the pt as per daughter because pt rises early in morning and does not oversleep. Daughter then drove from [place] to her fathers home and noted pt has large contusion to his scalp. Daughter then brought pt to the ED for evaluation No nausea, no vomiting. No seizure activity. No blood thinners. No recent illness, no fever, no chest pain or shortness of breath. No abdominal pain.    Initial CTH in ED (+) for  Subdural hemorrhage in the right frontotemporal convexity and within the left cerebellopontine angle cistern, hemorrhagic contusions in the right frontal and temporal lobes and left cerebellum, as well as scattered subarachnoid hemorrhage and small layering intraventricular hemorrhage.    Patient had episode of unresponsiveness earlier today. Now returned to baseline prior to event. Repeat CTH revealed Interval enlargement of the right frontoparietal hemorrhagic contusion with increased mass effect upon the anterior horn of the right lateral   ventricle and a new right to left midline shift of approximately 5 mm and slight interval increase in size of a small amount of layering blood  within the lateral ventricles. The patient has been evaluated by neurosurgery and it has been decided that there will be no neurosurgical interventions. Pt is DNR/DNI. Primary team considering Palliative care consult.    Neurology is being called to r/o seizure.      PAST MEDICAL & SURGICAL HISTORY:  HTN, HLD, BPH, Glaucoma    Home Medications:  AMLODIPINE   TAB 2.5MG:  (04 Jun 2019 23:38)  ATENOLOL     TAB 25MG:  (04 Jun 2019 23:38)  FLUOROMETHOL ELLA 0.1% OP:  (04 Jun 2019 23:38)  MEMANTINE    TAB HCL 10MG:  (04 Jun 2019 23:38)  ROSUVASTATIN TAB 5MG:  (04 Jun 2019 23:38)  TAMSULOSIN   CAP 0.4MG:  (04 Jun 2019 23:38)  TIMOLOL MAL  SOL 0.5% OP:  (04 Jun 2019 23:38)    Neuro Exam:  Orientation: oriented to person, oriented to place  Attention: following commands but poor attention  PERRL, EOMI  no facial droop  Moving all four extremities equally  upgoing plantars b/l    Allergies    No Known Allergies    Intolerances      MEDICATIONS  (STANDING):  acetaminophen   Tablet .. 650 milliGRAM(s) Oral every 6 hours  amLODIPine   Tablet 2.5 milliGRAM(s) Oral daily  ATENolol  Tablet 25 milliGRAM(s) Oral daily  atorvastatin 20 milliGRAM(s) Oral at bedtime  chlorhexidine 4% Liquid 1 Application(s) Topical <User Schedule>  fluorometholone 0.1% Suspension 1 Drop(s) Both EYES daily  levETIRAcetam  IVPB 500 milliGRAM(s) IV Intermittent every 12 hours  memantine 10 milliGRAM(s) Oral daily  pantoprazole    Tablet 40 milliGRAM(s) Oral before breakfast  sodium chloride 0.9%. 1000 milliLiter(s) (75 mL/Hr) IV Continuous <Continuous>  tamsulosin 0.4 milliGRAM(s) Oral at bedtime  timolol 0.5% Solution 1 Drop(s) Both EYES daily    MEDICATIONS  (PRN):      LABS:                        13.6   15.90 )-----------( 216      ( 10 Chato 2019 11:39 )             41.0     06-10    139  |  105  |  44<H>  ----------------------------<  109<H>  4.4   |  19  |  2.0<H>    Ca    8.1<L>      10 Chato 2019 11:39  Phos  6.4     06-10  Mg     2.6     06-10      Neuro Imaging:    < from: CT Head No Cont (06.10.19 @ 16:00) >  1.  Interval enlargement of a right frontoparietal hemorrhagic contusion   with increased mass effect upon the anterior horn of the right lateral   ventricle. There is new right to left midline shift of approximately 5 mm.    2.  Slight interval increase in size of a small amount of layering blood  within the lateral ventricles.    3.  Stable subdural hematomas over the right frontal temporal convexity   and left cerebellopontine angle cistern.    4.  Overall stable appearance of subarachnoid hemorrhage within the right   frontal and temporallobe sulci.    5.  No definite evidence of new parenchymal infarction.    6.  Stable left occipital extra-axial scalp hematoma. Decreased soft   tissue edema within the left parietal scalp. Stable mild diastasis of the   left lambdoid suture.    < end of copied text >    < from: EEG (06.06.19 @ 11:00) >  GENERALIZED SLOWING  Borderline generalized slowing      FOCAL SLOWING  Right frontotemporal focalslowing      AREA OF FOCAL SLOWING  As above      BREACH ARTIFACT  As above      Hyper Ventilation  Was not performed        Photic Stimulation  Was not performed      EPILEPTIFORM ACTIVITY  No epileptiform activity      TYPE  As above  As above      EVENTS  No events reported or recorded      IMPRESSIONS  This is an abnormal EEG due to the presence of borderline to mild   generalized and also right frontotemporal focal slowing    < end of copied text >

## 2019-06-10 NOTE — PROGRESS NOTE ADULT - ASSESSMENT
83 y/o man with PMH of mild dementia (lives alone at home and independent in ADL's and still driving), HTN, s/p cataract removal, COLTEN on CPAP, BPH, dyslipidemia, hearing impaired (uses hearing aids b/l), and recent falls was brought to the ER for s/p fall and altered mental status. He was admitted to SICU and is now downgraded to the floor.    1. s/p unwitnessed fall (?syncope) with resultant SDH, contusions, SAH and small IVH and large left parietal/occipital hematoma  followed by Neurosurgery who recommended no neurosurgical intervention   repeat CT scan of head STAT today - recall NS if any changes  fall precautions  on keppra for seizure prophylaxis   EEG report reviewed - no seizure activity, has abnormality in same region as SDH - neurology eval noted  had syncopal workup including ECHO, carotid doppler, unit monitoring (was on tele monitor)  check orthostatics when able  seen by rehab  physical therapy eval when mental status improves  SNF placement on discharge    2. Altered mental status/lethargy - likely multifactorial due to SDH, contusions in pt with mild dementia, now on keppra and not receiving CPAP at night  neuro checks  reorient frequently  repeat CT scan of head    3. COLTEN - son brought in CPAP machine from home  CPAP QHS    4. ALPHONSO - rule out prerenal (pt eating and drinking well now so less likely), postrenal (urinary retention in pt with BPH)  bladder US at bedside - straight cath if PVR > 200 - de la fuente if significant urine in bladder  official renal/bladder US  on tamulosin  avoid nephrotoxic meds  on IVF    5. Leukocytosis - now increasing - rule out infection although pt afebrile  check CXR, blood cultures, UA  no diarrhea    6. HTN - continue current management    6. Dyslipidemia on statin    8. Dementia - reorient frequently    9. DVT prophylaxis - on heparin SQ    10. DNR/DNI  Overall guarded prognosis      PROGRESS NOTE HANDOFF    Pending: CT scan of head, renal/bladder US, UA, urine studies, CXR, blood cultures    Family discussion: yes, with both daughters    Disposition: SNF placement 83 y/o man with PMH of mild dementia (lives alone at home and independent in ADL's and still driving), HTN, s/p cataract removal, COLTEN on CPAP, BPH, dyslipidemia, hearing impaired (uses hearing aids b/l), and recent falls was brought to the ER for s/p fall and altered mental status. He was admitted to SICU and is now downgraded to the floor.    1. s/p unwitnessed fall (?syncope) with resultant SDH, contusions, SAH and small IVH and large left parietal/occipital hematoma  followed by Neurosurgery who recommended no neurosurgical intervention   repeat CT scan of head STAT today - recall NS if any changes  fall precautions  on keppra for seizure prophylaxis   EEG report reviewed - no seizure activity, has abnormality in same region as SDH - neurology eval noted  had syncopal workup including ECHO, carotid doppler, unit monitoring (was on tele monitor)  check orthostatics when able  seen by rehab  physical therapy eval when mental status improves  SNF placement on discharge    2. Altered mental status/lethargy - likely multifactorial due to SDH, contusions in pt with mild dementia, now on keppra and not receiving CPAP at night  neuro checks  reorient frequently  repeat CT scan of head    3. COLTEN - son brought in CPAP machine from home  CPAP QHS    4. ALPHONSO - rule out prerenal (pt eating and drinking well now so less likely), postrenal (urinary retention in pt with BPH)  bladder US at bedside - straight cath if PVR > 200 - de la fuente if significant urine in bladder  official renal/bladder US  on tamulosin  avoid nephrotoxic meds  on IVF    5. Leukocytosis - now increasing - rule out infection although pt afebrile  check CXR, blood cultures, UA  no diarrhea    6. HTN - continue current management    6. Dyslipidemia on statin    8. Dementia - reorient frequently    9. DVT prophylaxis - on heparin SQ    10. DNR/DNI  Overall guarded prognosis      PROGRESS NOTE HANDOFF    Pending: CT scan of head, renal/bladder US, UA, urine studies, CXR, blood cultures    Family discussion: yes, with both daughters    Disposition: SNF placement      Addendum:     Pt had rapid response while awaiting CT scan of head. Per RN, he was unresponsive and then became more responsive after about 10 minutes.     < from: CT Head No Cont (06.10.19 @ 16:00) >    FINDINGS/  IMPRESSION:    1.  Interval enlargement of a right frontoparietal hemorrhagic contusion   with increased mass effect upon the anterior horn of the right lateral   ventricle. There is new right to left midline shift of approximately 5 mm.    2.  Slight interval increase in size of a small amount of layering blood  within the lateral ventricles.    3.  Stable subdural hematomas over the right frontal temporal convexity   and left cerebellopontine angle cistern.    4.  Overall stable appearance of subarachnoid hemorrhage within the right   frontal and temporallobe sulci.    5.  No definite evidence of new parenchymal infarction.    6.  Stable left occipital extra-axial scalp hematoma. Decreased soft   tissue edema within the left parietal scalp. Stable mild diastasis of the   left lambdoid suture.    Dr. Tree Drake discussed preliminary findings with NAN ARAIZA on   6/10/2019 4:32 PM with readback.    < end of copied text >    Pt is now awake, alert and follows some commands.   Abdomen remains distended - [place] ordered.   Renal eval appreciated.   Case discussed with intern, RN, surgery attending and NS PA today.  NS PA reviewed case with attending who reviewed the CT scan.   No plan for NS intervention.   NS recommends to keep Na 145 - pt already on NS.  Pt is DNR/DNI.  Pt may have had a seizure.   Continue Keppra 500mg IV q12hr (renal dose) - increase dose if renal function improves.  Neurology f/u.  EEG ordered.  Pt with overall very poor prognosis and can decompensate at any time.   Currently, he is hemodynamically stable on the medical floor.   Case discussed with pt's daughter Angeline Vázquez in detail today who is aware of the poor prognosis.  Will discuss with family re: palliative care if no improvement.

## 2019-06-10 NOTE — CHART NOTE - NSCHARTNOTEFT_GEN_A_CORE
GENERAL SURGERY FLOOR TRANSFER NOTE    84y [id number] transferred to medicine from trauma service.    SUBJECTIVE: patient is a 84 year old male originally admitted s/p fall with +HT and SDH and SAH. Neurosurgery was monitoring his head bleeds which originally worsened and then improved prior to being downgraded from the ICU. He was cleared from a trauma perspective but during his syncope workup he was noted to have EEG changes that indicated an acute toxic-metabolic cause on top of his baseline dementia. Over the weekend, despite continued care, his leukocytosis worsened and he developed an ALPHONSO with his Creatinine going from 0.8 to 1.7. He waxes and wanes at baseline but his mental status has worsened over the past 2 days. He is cleared with no additional traumatic injuries and is being transferred to medicine for continued medial optimization       SUBDURAL HEMATOMA DUE TO CONCUSSION WITH LOC, SEQUELA;FALL    No Known Allergies    acetaminophen   Tablet .. 650 milliGRAM(s) Oral every 6 hours  amLODIPine   Tablet 2.5 milliGRAM(s) Oral daily  ATENolol  Tablet 25 milliGRAM(s) Oral daily  atorvastatin 20 milliGRAM(s) Oral at bedtime  chlorhexidine 4% Liquid 1 Application(s) Topical <User Schedule>  fluorometholone 0.1% Suspension 1 Drop(s) Both EYES daily  heparin  Injectable 5000 Unit(s) SubCutaneous every 8 hours  levETIRAcetam  IVPB 500 milliGRAM(s) IV Intermittent every 12 hours  memantine 10 milliGRAM(s) Oral daily  pantoprazole    Tablet 40 milliGRAM(s) Oral before breakfast  sodium chloride 0.9%. 1000 milliLiter(s) IV Continuous <Continuous>  tamsulosin 0.4 milliGRAM(s) Oral at bedtime  timolol 0.5% Solution 1 Drop(s) Both EYES daily      OBJECTIVE:    T(C): 37.1 (06-10-19 @ 07:30), Max: 37.1 (06-10-19 @ 07:30)  HR: 68 (06-10-19 @ 07:30) (60 - 72)  BP: 146/83 (06-10-19 @ 07:30) (137/63 - 163/83)  RR: 23 (06-10-19 @ 07:30) (19 - 25)  SpO2: 95% (06-10-19 @ 07:30) (94% - 95%)  Wt(kg): --      I&O's Summary    09 Jun 2019 07:01  -  10 Chato 2019 07:00  --------------------------------------------------------  IN: 1100 mL / OUT: 0 mL / NET: 1100 mL                        14.0   15.63 )-----------( 226      ( 09 Jun 2019 20:54 )             42.2       06-09    135  |  99  |  36<H>  ----------------------------<  145<H>  4.2   |  19  |  1.7<H>    Ca    8.5      09 Jun 2019 20:54  Phos  5.2     06-09  Mg     2.2     06-09        MEDICATIONS  (STANDING):  acetaminophen   Tablet .. 650 milliGRAM(s) Oral every 6 hours  amLODIPine   Tablet 2.5 milliGRAM(s) Oral daily  ATENolol  Tablet 25 milliGRAM(s) Oral daily  atorvastatin 20 milliGRAM(s) Oral at bedtime  chlorhexidine 4% Liquid 1 Application(s) Topical <User Schedule>  fluorometholone 0.1% Suspension 1 Drop(s) Both EYES daily  heparin  Injectable 5000 Unit(s) SubCutaneous every 8 hours  levETIRAcetam  IVPB 500 milliGRAM(s) IV Intermittent every 12 hours  memantine 10 milliGRAM(s) Oral daily  pantoprazole    Tablet 40 milliGRAM(s) Oral before breakfast  sodium chloride 0.9%. 1000 milliLiter(s) (75 mL/Hr) IV Continuous <Continuous>  tamsulosin 0.4 milliGRAM(s) Oral at bedtime  timolol 0.5% Solution 1 Drop(s) Both EYES daily    Basic Metabolic Panel: 11:00 (06-10-19 @ 10:10)  Complete Blood Count + Automated Diff: 11:00 (06-10-19 @ 10:10)  Magnesium, Serum: 11:00 (06-10-19 @ 10:10)  Phosphorus Level, Serum: 11:00 (06-10-19 @ 10:10) GENERAL SURGERY FLOOR TRANSFER NOTE    84y [id number] transferred to medicine from trauma service.    SUBJECTIVE: patient is a 84 year old male originally admitted s/p fall with +HT and SDH and SAH. Neurosurgery was monitoring his head bleeds which originally worsened and then improved prior to being downgraded from the ICU. He was cleared from a trauma perspective but during his syncope workup he was noted to have EEG changes that indicated an acute toxic-metabolic cause on top of his baseline dementia. Over the weekend, despite continued care, his leukocytosis worsened and he developed an ALPHONSO with his Creatinine going from 0.8 to 1.7. He waxes and wanes at baseline but his mental status has worsened over the past 2 days. He is cleared with no additional traumatic injuries and is being transferred to medicine for continued medial optimization       Patient signed out to chief medical resident [phone]    SUBDURAL HEMATOMA DUE TO CONCUSSION WITH LOC, SEQUELA;FALL    No Known Allergies    acetaminophen   Tablet .. 650 milliGRAM(s) Oral every 6 hours  amLODIPine   Tablet 2.5 milliGRAM(s) Oral daily  ATENolol  Tablet 25 milliGRAM(s) Oral daily  atorvastatin 20 milliGRAM(s) Oral at bedtime  chlorhexidine 4% Liquid 1 Application(s) Topical <User Schedule>  fluorometholone 0.1% Suspension 1 Drop(s) Both EYES daily  heparin  Injectable 5000 Unit(s) SubCutaneous every 8 hours  levETIRAcetam  IVPB 500 milliGRAM(s) IV Intermittent every 12 hours  memantine 10 milliGRAM(s) Oral daily  pantoprazole    Tablet 40 milliGRAM(s) Oral before breakfast  sodium chloride 0.9%. 1000 milliLiter(s) IV Continuous <Continuous>  tamsulosin 0.4 milliGRAM(s) Oral at bedtime  timolol 0.5% Solution 1 Drop(s) Both EYES daily      OBJECTIVE:    T(C): 37.1 (06-10-19 @ 07:30), Max: 37.1 (06-10-19 @ 07:30)  HR: 68 (06-10-19 @ 07:30) (60 - 72)  BP: 146/83 (06-10-19 @ 07:30) (137/63 - 163/83)  RR: 23 (06-10-19 @ 07:30) (19 - 25)  SpO2: 95% (06-10-19 @ 07:30) (94% - 95%)  Wt(kg): --      I&O's Summary    09 Jun 2019 07:01  -  10 Chato 2019 07:00  --------------------------------------------------------  IN: 1100 mL / OUT: 0 mL / NET: 1100 mL                        14.0   15.63 )-----------( 226      ( 09 Jun 2019 20:54 )             42.2       06-09    135  |  99  |  36<H>  ----------------------------<  145<H>  4.2   |  19  |  1.7<H>    Ca    8.5      09 Jun 2019 20:54  Phos  5.2     06-09  Mg     2.2     06-09        MEDICATIONS  (STANDING):  acetaminophen   Tablet .. 650 milliGRAM(s) Oral every 6 hours  amLODIPine   Tablet 2.5 milliGRAM(s) Oral daily  ATENolol  Tablet 25 milliGRAM(s) Oral daily  atorvastatin 20 milliGRAM(s) Oral at bedtime  chlorhexidine 4% Liquid 1 Application(s) Topical <User Schedule>  fluorometholone 0.1% Suspension 1 Drop(s) Both EYES daily  heparin  Injectable 5000 Unit(s) SubCutaneous every 8 hours  levETIRAcetam  IVPB 500 milliGRAM(s) IV Intermittent every 12 hours  memantine 10 milliGRAM(s) Oral daily  pantoprazole    Tablet 40 milliGRAM(s) Oral before breakfast  sodium chloride 0.9%. 1000 milliLiter(s) (75 mL/Hr) IV Continuous <Continuous>  tamsulosin 0.4 milliGRAM(s) Oral at bedtime  timolol 0.5% Solution 1 Drop(s) Both EYES daily    Basic Metabolic Panel: 11:00 (06-10-19 @ 10:10)  Complete Blood Count + Automated Diff: 11:00 (06-10-19 @ 10:10)  Magnesium, Serum: 11:00 (06-10-19 @ 10:10)  Phosphorus Level, Serum: 11:00 (06-10-19 @ 10:10)

## 2019-06-10 NOTE — CONSULT NOTE ADULT - ASSESSMENT
Patient with ALPHONSO presented to hospital for unwitnessed fall and subdural hematoma on CT head  unknown PMH    # ALPHONSO   - due to ?ATN due to rapid fluctuation in BP.   - check urine creatinine, Na, Urea, Pr/Cr ratio   - Monitor I/O   - Repeat BMP, trend creatinine   - Patient dry on exam, ok to continue IVF with NS   - Repeat CXR performed, does not seem changed from last CXR, official read pending.   - BP noted, near goal. keep meds current for now, if SBP persistently > 150 switch amlodipine to nifedipine xl 30 mg q daily.   - check renal bladder sono   - avoid nephrotoxins and hypotension   - no need for RRT at the moment.   - subdural hematoma: followed by neurosurgery, notes appreciated.   - will follow

## 2019-06-10 NOTE — CHART NOTE - NSCHARTNOTEFT_GEN_A_CORE
Progress Note: Surgery  Patient: ASCENCION LANGLEY , 84y (1935)Male   MRN: [id number]  Location: [address]  Visit: 06-04-19 Inpatient  Date: 06-10-19 @ 19:48    Events over 24h: Rapid response called on patient for decreased responsiveness. After seeing the patient the medicine team ordered a CT head that showed expansion of right frontoparietal hemorrhagic contusion and increased mass effect. Neurosurgery was recalled and said that there is no acute surgical indication and should be worked up by Neurology for possible seizure activity. Patient was reevaluated by the trauma team as per the request of Dr. Clemente. No acute changes noted patient has returned to his baseline A+Ox1, no additional injuries found on physical exam. Patient remains cleared from trauma service.     Vitals: T(F): 98.3 (06-10-19 @ 16:15), Max: 98.7 (06-10-19 @ 07:30)  HR: 67 (06-10-19 @ 16:15)  BP: 127/59 (06-10-19 @ 16:15) (127/59 - 162/81)  RR: 20 (06-10-19 @ 16:15)  SpO2: 95% (06-10-19 @ 07:30)    Diet: Diet, DASH/TLC:   Sodium & Cholesterol Restricted (06-05-19 @ 08:59)    Medications: [Standing]  acetaminophen   Tablet .. 650 milliGRAM(s) Oral every 6 hours  amLODIPine   Tablet 2.5 milliGRAM(s) Oral daily  ATENolol  Tablet 25 milliGRAM(s) Oral daily  atorvastatin 20 milliGRAM(s) Oral at bedtime  chlorhexidine 4% Liquid 1 Application(s) Topical <User Schedule>  fluorometholone 0.1% Suspension 1 Drop(s) Both EYES daily  levETIRAcetam  IVPB 500 milliGRAM(s) IV Intermittent every 12 hours  memantine 10 milliGRAM(s) Oral daily  pantoprazole    Tablet 40 milliGRAM(s) Oral before breakfast  sodium chloride 0.9%. 1000 milliLiter(s) (75 mL/Hr) IV Continuous <Continuous>  tamsulosin 0.4 milliGRAM(s) Oral at bedtime  timolol 0.5% Solution 1 Drop(s) Both EYES daily    Medications:[PRN]    Labs:                        13.6   15.90 )-----------( 216      ( 10 Chato 2019 11:39 )             41.0     06-10    139  |  105  |  44<H>  ----------------------------<  109<H>  4.4   |  19  |  2.0<H>    Ca    8.1<L>      10 Chato 2019 11:39  Phos  6.4     06-10  Mg     2.6     06-10    ABG - ( 10 Chato 2019 14:54 )  pH: 7.46  /  pCO2: 25    /  pO2: 180   / HCO3: 18    / Base Excess: -4.4  /  SaO2: 100       Imaging:  < from: CT Head No Cont (06.10.19 @ 16:00) >    IMPRESSION:    1.  Interval enlargement of a right frontoparietal hemorrhagic contusion   with increased mass effect upon the anterior horn of the right lateral   ventricle. There is new right to left midline shift of approximately 5 mm.    2.  Slight interval increase in size of a small amount of layering blood  within the lateral ventricles.    3.  Stable subdural hematomas over the right frontal temporal convexity   and left cerebellopontine angle cistern.    4.  Overall stable appearance of subarachnoid hemorrhage within the right   frontal and temporallobe sulci.    5.  No definite evidence of new parenchymal infarction.    6.  Stable left occipital extra-axial scalp hematoma. Decreased soft   tissue edema within the left parietal scalp. Stable mild diastasis of the   left lambdoid suture.    < end of copied text >    Date/Time: 06-10-19 @ 19:48

## 2019-06-10 NOTE — CONSULT NOTE ADULT - ATTENDING COMMENTS
I was Physically Present for the key portions of the evaluation   I agree with the above History  , Physical examination Assessment and plan   I have Reviewed , Modified or appended where appropriate.  Please check A and P as above   1- Phuong rule out ATN / obst component  check renal bladder sono  insert de la fuente   continue NS for now   avoid nephrotoxins  repeat BMP  no need for RRT  repeat CT scan has SDH     will follow

## 2019-06-11 LAB
ALBUMIN SERPL ELPH-MCNC: 3 G/DL — LOW (ref 3.5–5.2)
ALP SERPL-CCNC: 26 U/L — LOW (ref 30–115)
ALT FLD-CCNC: 51 U/L — HIGH (ref 0–41)
ANION GAP SERPL CALC-SCNC: 16 MMOL/L — HIGH (ref 7–14)
AST SERPL-CCNC: 31 U/L — SIGNIFICANT CHANGE UP (ref 0–41)
BASOPHILS # BLD AUTO: 0.03 K/UL — SIGNIFICANT CHANGE UP (ref 0–0.2)
BASOPHILS NFR BLD AUTO: 0.2 % — SIGNIFICANT CHANGE UP (ref 0–1)
BILIRUB SERPL-MCNC: 0.7 MG/DL — SIGNIFICANT CHANGE UP (ref 0.2–1.2)
BLD GP AB SCN SERPL QL: SIGNIFICANT CHANGE UP
BUN SERPL-MCNC: 33 MG/DL — HIGH (ref 10–20)
CALCIUM SERPL-MCNC: 8.2 MG/DL — LOW (ref 8.5–10.1)
CHLORIDE SERPL-SCNC: 106 MMOL/L — SIGNIFICANT CHANGE UP (ref 98–110)
CO2 SERPL-SCNC: 23 MMOL/L — SIGNIFICANT CHANGE UP (ref 17–32)
CREAT SERPL-MCNC: 1.1 MG/DL — SIGNIFICANT CHANGE UP (ref 0.7–1.5)
EOSINOPHIL # BLD AUTO: 0.29 K/UL — SIGNIFICANT CHANGE UP (ref 0–0.7)
EOSINOPHIL NFR BLD AUTO: 2.2 % — SIGNIFICANT CHANGE UP (ref 0–8)
GLUCOSE SERPL-MCNC: 130 MG/DL — HIGH (ref 70–99)
HCT VFR BLD CALC: 37.5 % — LOW (ref 42–52)
HGB BLD-MCNC: 12.4 G/DL — LOW (ref 14–18)
IMM GRANULOCYTES NFR BLD AUTO: 1.4 % — HIGH (ref 0.1–0.3)
LYMPHOCYTES # BLD AUTO: 1.22 K/UL — SIGNIFICANT CHANGE UP (ref 1.2–3.4)
LYMPHOCYTES # BLD AUTO: 9.2 % — LOW (ref 20.5–51.1)
MCHC RBC-ENTMCNC: 29.4 PG — SIGNIFICANT CHANGE UP (ref 27–31)
MCHC RBC-ENTMCNC: 33.1 G/DL — SIGNIFICANT CHANGE UP (ref 32–37)
MCV RBC AUTO: 88.9 FL — SIGNIFICANT CHANGE UP (ref 80–94)
MONOCYTES # BLD AUTO: 1.06 K/UL — HIGH (ref 0.1–0.6)
MONOCYTES NFR BLD AUTO: 8 % — SIGNIFICANT CHANGE UP (ref 1.7–9.3)
NEUTROPHILS # BLD AUTO: 10.48 K/UL — HIGH (ref 1.4–6.5)
NEUTROPHILS NFR BLD AUTO: 79 % — HIGH (ref 42.2–75.2)
NRBC # BLD: 0 /100 WBCS — SIGNIFICANT CHANGE UP (ref 0–0)
PHOSPHATE SERPL-MCNC: 3.7 MG/DL — SIGNIFICANT CHANGE UP (ref 2.1–4.9)
PLATELET # BLD AUTO: 230 K/UL — SIGNIFICANT CHANGE UP (ref 130–400)
POTASSIUM SERPL-MCNC: 3.8 MMOL/L — SIGNIFICANT CHANGE UP (ref 3.5–5)
POTASSIUM SERPL-SCNC: 3.8 MMOL/L — SIGNIFICANT CHANGE UP (ref 3.5–5)
PROT SERPL-MCNC: 5.4 G/DL — LOW (ref 6–8)
RBC # BLD: 4.22 M/UL — LOW (ref 4.7–6.1)
RBC # FLD: 14.3 % — SIGNIFICANT CHANGE UP (ref 11.5–14.5)
SODIUM SERPL-SCNC: 145 MMOL/L — SIGNIFICANT CHANGE UP (ref 135–146)
WBC # BLD: 13.26 K/UL — HIGH (ref 4.8–10.8)
WBC # FLD AUTO: 13.26 K/UL — HIGH (ref 4.8–10.8)

## 2019-06-11 PROCEDURE — 99233 SBSQ HOSP IP/OBS HIGH 50: CPT

## 2019-06-11 RX ORDER — NIFEDIPINE 30 MG
10 TABLET, EXTENDED RELEASE 24 HR ORAL THREE TIMES A DAY
Refills: 0 | Status: DISCONTINUED | OUTPATIENT
Start: 2019-06-11 | End: 2019-06-11

## 2019-06-11 RX ORDER — NIFEDIPINE 30 MG
30 TABLET, EXTENDED RELEASE 24 HR ORAL DAILY
Refills: 0 | Status: DISCONTINUED | OUTPATIENT
Start: 2019-06-11 | End: 2019-06-11

## 2019-06-11 RX ORDER — NIFEDIPINE 30 MG
10 TABLET, EXTENDED RELEASE 24 HR ORAL THREE TIMES A DAY
Refills: 0 | Status: DISCONTINUED | OUTPATIENT
Start: 2019-06-11 | End: 2019-06-12

## 2019-06-11 RX ADMIN — CHLORHEXIDINE GLUCONATE 1 APPLICATION(S): 213 SOLUTION TOPICAL at 07:23

## 2019-06-11 RX ADMIN — Medication 1 DROP(S): at 11:50

## 2019-06-11 RX ADMIN — AMLODIPINE BESYLATE 2.5 MILLIGRAM(S): 2.5 TABLET ORAL at 05:26

## 2019-06-11 RX ADMIN — LEVETIRACETAM 420 MILLIGRAM(S): 250 TABLET, FILM COATED ORAL at 17:20

## 2019-06-11 RX ADMIN — Medication 10 MILLIGRAM(S): at 21:25

## 2019-06-11 RX ADMIN — LEVETIRACETAM 420 MILLIGRAM(S): 250 TABLET, FILM COATED ORAL at 05:26

## 2019-06-11 RX ADMIN — Medication 650 MILLIGRAM(S): at 05:26

## 2019-06-11 RX ADMIN — Medication 650 MILLIGRAM(S): at 11:51

## 2019-06-11 RX ADMIN — MEMANTINE HYDROCHLORIDE 10 MILLIGRAM(S): 10 TABLET ORAL at 11:51

## 2019-06-11 RX ADMIN — FLUOROMETHOLONE 1 DROP(S): 1 SOLUTION/ DROPS OPHTHALMIC at 11:50

## 2019-06-11 RX ADMIN — Medication 650 MILLIGRAM(S): at 17:20

## 2019-06-11 RX ADMIN — ATORVASTATIN CALCIUM 20 MILLIGRAM(S): 80 TABLET, FILM COATED ORAL at 21:10

## 2019-06-11 RX ADMIN — SODIUM CHLORIDE 75 MILLILITER(S): 9 INJECTION INTRAMUSCULAR; INTRAVENOUS; SUBCUTANEOUS at 07:46

## 2019-06-11 RX ADMIN — TAMSULOSIN HYDROCHLORIDE 0.4 MILLIGRAM(S): 0.4 CAPSULE ORAL at 21:10

## 2019-06-11 RX ADMIN — ATENOLOL 25 MILLIGRAM(S): 25 TABLET ORAL at 05:26

## 2019-06-11 RX ADMIN — SODIUM CHLORIDE 75 MILLILITER(S): 9 INJECTION INTRAMUSCULAR; INTRAVENOUS; SUBCUTANEOUS at 17:19

## 2019-06-11 RX ADMIN — PANTOPRAZOLE SODIUM 40 MILLIGRAM(S): 20 TABLET, DELAYED RELEASE ORAL at 07:46

## 2019-06-11 RX ADMIN — Medication 650 MILLIGRAM(S): at 23:58

## 2019-06-11 NOTE — DIETITIAN INITIAL EVALUATION ADULT. - ETIOLOGY
Refill Request    lamoTRIgine (LAMICTAL) 100 MG tablet    [facility]   [phone]  
worsening mental status

## 2019-06-11 NOTE — DIETITIAN INITIAL EVALUATION ADULT. - NS AS NUTRI INTERV MEALS SNACK
Other (specify)/When medically feasible, SLP to reevaluate pt for appropriate diet texture/consistency--diet order to be consistent with SLP recs

## 2019-06-11 NOTE — PROVIDER CONTACT NOTE (OTHER) - SITUATION
Pt has very heavy breathing. Lung sound wheezing on inspiration. Respiration 25, saturating at 95% on 2L oxygen however pt is confused and sat goes down when pt keeps pulling the oxygen tubing off.
During Rn's morning assessment RN noted pt to be lethargic and pupils were 2mm instead of 3mm like yesterday. when pt was awake he fell asleep during conversation, but he does follow commands
I see that pt is NPO except meds. we have been crushing pt's meds and mixing them in pudding. can we still do this?
Informed MD that straight cath resulted in 700cc of urine
RN went to assess pt and pt c/o difficulty clearing throat pt did look slightly distressed RN checked pulse ox and pt is 94% on room air. RN elevated HOB and gave pt Incentive spirometer to help break
can you please renew Rosa
pt is NPO, RN provided teaching and education to family as well as speech and swallow therapist on this plan of care earlier in the shift. despite this teaching and education pt's family gave him
pt is noted to be less lethargic than he was previously pulse ox is now 94% on rom air
pt noted to have 3 loose BM's this shift. pt is not on any stool softeners or antibiotics. are you concerned for C.Diff?
pt's family is currently at the bedside and has some questions/concerns about the pt. they are asking to speak with a doctor.
bp 191/91 hr 71

## 2019-06-11 NOTE — PROVIDER CONTACT NOTE (OTHER) - REASON
Lee Renewal
NPO status
Straight cath result
change in mental status
family noncompliance
family questions/concerns
loose BM
pt c/o unable to clear mucous from throat
update on change on MS
High BP
Respiration

## 2019-06-11 NOTE — DIETITIAN INITIAL EVALUATION ADULT. - PHYSICAL APPEARANCE
well nourished/BMI: 26.9, IBW: 154#, UBW: ~180#, denies any recent wt loss. ecchymosis and no edema noted.

## 2019-06-11 NOTE — DIETITIAN INITIAL EVALUATION ADULT. - FEEDING SKILL
Per daughter, pt has been consuming 25-50% of his meal trays. Agreeable to trying an oral supplement for adequate nutrition./total assistance

## 2019-06-11 NOTE — PROGRESS NOTE ADULT - ASSESSMENT
Pt seen, examined. Awake but not oriented , gets asleep in the middle of exam, denies headache but answers sometimes were not much appropriate.  Neck supple.  Speech fluent, followed a few simple commands.   Left arm drops faster than right, when both elevated. Gait - deferred.  Repeat HCT from 6/10/19 - worsening of right fr-temporal contusion edema w/ mass effect; still right fr-temp. SDH, unchanged SAH, left scalp occipital hematoma.  Seen by NS as follow up after repeat HCT done - no intervention.  On Keppra 500 mg BID for seizure prophylaxis  Routine EEG a few days ago - no spikes    A/P. S/p fall. Traumatic SDH, SAH, right fr-parietal contusion.   Worsening of mass effect compared to HCT results on 6/5/19.  Fluctuating mental status.   - suggest Epilepsy consult /[id number] to exclude ongoing complex partial seizures  - recommend one dose of Mannitol (if BP not low) to decrease brain edema - consider from 30 to 50 g IV once then will reevaluate; Watch BP  - cont. Keppra 500 mg BID   Please, call if any questions

## 2019-06-11 NOTE — PROGRESS NOTE ADULT - ASSESSMENT
85 YO M w/PMHx of Dementia, unsteady gait/falls, HTN, DLD, BPH, Glaucoma, was  BIBEMS, after unwitnessed fall (+HT, -LOC, -AC) and was found to have subdural/subarachnoid hemorrhage, parietal/occipital extracalvarial hematoma and hemorrhagic of the frontal, temporal and cerebellum. Hospital course was complicated by worsening leukocytosis, mental status and ALPHONSO w/Cr (0.8 to 1.7), transferred to medicine for medial optimization.      Rapid response was called for unresponsiveness, vitals were normal and pt was started on O2 by NC and sent for stat CT head: worsening intraparencymal hemorrhage w/R to L small shift (difficult to estimate due to pt's curved midline),   increasing mass effect. Pt likely will need stat decompression by Neurosx. Recall neuro sx stat !      #S/P unwitnessed fall/SDH R frontotemporal/scattered SAH/small layering IVH/hemorrhagic contusion of R. frontal, temporal lobes, left cerebellum/nondisplaced diastatic fracture: worsening  - Neurosx signed off, no interventions  - Neuro: cont keppra, f/u outpatient in 4 weeks  - Trauma sx on board, no additional   - Worsening mental status: repeat CT head stat  - EEG: no seizure activity/abnormality in same region as SDH  - Syncope w/u: tele, carotid doppler, Echo (sclerotic aortic valve)  - Physiatry: STR in STR  - PT eval pending improvement  - NeroSx recall stat: No sx intervention, likely due to seizure.  - Neurology recalled for possible seizure      #AMS vs. Seizure: worse, pt unresponsive  - Stat CT head: worse w/inreased bleeding and a new 5 mm midline shift  - Monitor vitals  - f/u labs    #ALPHONSO worsening  - Cr 2.0  - Bladder scan stat  - US Kidneys/Bladder  - Cont IV hydration  - Strict Is&Os, Weight QD  - Avoid nephrotoxic meds  - Nephro apprec, recs noted    #Leukocytosis unknown etiology  - No clear signs of infection  - Sepsis w/u  - CXR    #BPH  - Cont Tamsulosin 0.4 qhs    #HTN: controlled  - Cont Amlodipine/Atenolol    #DLD  - Cont Lipitor    #Dementia:   - Cont current meds    #DVT ppx: heparin  #Activity: increase as tolerated  #Diet: DASH/TLC  #Dispo: Acute  #DNR/DNI 83 YO M DNR;/DNI, w/PMHx of Dementia, unsteady gait/falls, HTN, DLD, BPH, Glaucoma, was  BIBEMS, after unwitnessed fall (+HT, -LOC, -AC) and was found to have subdural/subarachnoid hemorrhage, parietal/occipital extracalvarial hematoma and hemorrhagic of the frontal, temporal and cerebellum. Hospital course was complicated by worsening leukocytosis, mental status and ALPHONSO w/Cr (0.8 to 1.7), transferred to medicine for medial optimization.      Rapid response was called for unresponsiveness, vitals were normal and pt was started on O2 by NC and sent for stat CT head: worsening intraparencymal hemorrhage w/R to L small shift or 5 mm  increasing mass effect.  Recall neuro sx stat !      #S/P unwitnessed fall/SDH R frontotemporal/scattered SAH/small layering IVH/hemorrhagic contusion of R. frontal, temporal lobes, left cerebellum/nondisplaced diastatic fracture: worsening  - Neurosx signed off, no interventions  - Neuro: cont keppra, f/u outpatient in 4 weeks  - Trauma sx on board, no additional   - Worsening mental status: repeat CT head stat  - EEG: no seizure activity/abnormality in same region as SDH  - Syncope w/u: tele, carotid doppler, Echo (sclerotic aortic valve)  - Physiatry: STR in STR  - PT eval pending improvement  - NeroSx recall stat: No sx intervention, likely due to seizure.  - Neurology recalled for possible seizure      #AMS vs. Seizure: worse, pt unresponsive  - Stat CT head: worse w/inreased bleeding and a new 5 mm midline shift  - Monitor vitals  - f/u labs    #ALPHONSO worsening  - Cr 2.0  - Bladder scan stat  - Lee placed  - US Kidneys/Bladder  - Cont IV hydration  - Strict Is&Os, Weight QD  - Avoid nephrotoxic meds  - Nephro apprec, recs noted    #Leukocytosis unknown etiology: down trending  - No clear signs of infection except the lungs  - Sepsis w/u  - CXR no acute pathology    #BPH  - Cont Tamsulosin 0.4 qhs    #HTN: elevated  - Cont Amlodipine/Atenolol    #DLD  - Cont Lipitor    #Dementia:   - Cont current meds    #DVT ppx: heparin  #Activity: increase as tolerated  #Diet: DASH/TLC  #Dispo: Acute  #DNR/DNI 85 YO M DNR;/DNI, w/PMHx of Dementia, unsteady gait/falls, HTN, DLD, BPH, Glaucoma, was  BIBEMS, after unwitnessed fall (+HT, -LOC, -AC) and was found to have subdural/subarachnoid hemorrhage, parietal/occipital extracalvarial hematoma and frontal/temporal/cerebellar hemorrhages. He was downgraded from SICU and transferred to Medicine for medical optimization after hospital course was complicated by worsening leukocytosis, mental status and ALPHONSO on 06/10/19  Rapid response was called for unresponsiveness on 06/10/19, vitals were normal, stat CT head: worsening intraparencymal hemorrhage w/R to L small shift of 5 mm, increasing mass effect. Neurosx was recalled stat and they suspected seizure and recommended neuro recall. Neuro recs video EEG, Mannitol and cont. Keppra    #S/P unwitnessed fall/SDH/SAH/Frontal and Temporal hemorrhagic Contusion/Diastatic Nondisplaced Fracture of Cerebellun: worsening  - Neurosx: no interventions, likely seizure  - Neuro: VEEG, Mannitol 1x, cont keppra, f/u outpatient in 4 weeks  - CT head 06/10/19: Worsening hemorrhage w/midline shift of 5 mm  - EEG: no seizure activity/abnormality in same region as SDH  - Syncope w/u: negative  - Physiatry: STR in STR  - PT eval pending improvement    #AMS vs. Seizure: worse  - CT head worse w/midline shift  - Monitor vitals    #ALPHONSO resolving  - Cr 2.0->1.1  - [place] functioning  - US Kidneys/Bladder: unremarkable  - Cont IV hydration  - Strict Is&Os, Weight QD  - Avoid nephrotoxic meds  - Nephro following    #Leukocytosis unknown etiology: down trending  - Sepsis w/u negative so far  - CXR no acute pathology per official read    #BPH  - Cont Tamsulosin 0.4 qhs  - Cont de la fu[place]    #HTN: stable  - Cont Amlodipine/Atenolol  - Monitor BP    #DLD  - Cont Lipitor    #Dementia:   - Cont current meds    #DVT ppx: SCD  #Activity: ambulate w/assistance  #Diet: DASH/TLC  #Dispo: Acute  #DNR/DNI

## 2019-06-11 NOTE — PROGRESS NOTE ADULT - SUBJECTIVE AND OBJECTIVE BOX
DAILY PROGRESS NOTE  ===========================================================    Patient Information:  ASCENCION LANGLEY  /  84y  /  Male  /  MRN#: [id number]    Hospital Day: 7d     |:::::::::::::::::::::::::::| SUBJECTIVE |:::::::::::::::::::::::::::|    OVERNIGHT EVENTS: Rapid response yesterday for unresponsiveness, had stat CT head->worsening ICH->Neurosx: no intervention, likely seizure->improved  TODAY: Patient was seen today at bedside.     |:::::::::::::::::::::::::::| OBJECTIVE |:::::::::::::::::::::::::::|    VITAL SIGNS: Last 24 Hours  T(C): 37.1 (11 Jun 2019 00:00), Max: 37.1 (11 Jun 2019 00:00)  T(F): 98.8 (11 Jun 2019 00:00), Max: 98.8 (11 Jun 2019 00:00)  HR: 75 (11 Jun 2019 05:59) (67 - 75)  BP: 167/71 (11 Jun 2019 05:59) (127/59 - 167/71)  BP(mean): --  ABP: --  ABP(mean): --  RR: 18 (11 Jun 2019 00:00) (18 - 20)  SpO2: 97% (11 Jun 2019 05:59) (97% - 97%)      PHYSICAL EXAM:  GENERAL: Patient was lying in bed,   HEENT: Head ; Conjunctivae clear, Sclera anicteric; Pupils were normal size and reactive to light. Oral mucosa moist.  CARDIO: Regular rate; Regular rhythm; S1 & S2.  RESP: Coarse b/l  GI: Distended, firm; +BS; No guarding; No rebound tenderness.  EXT: No edema in UE and LE.  NEURO: Moving extremities voluntarily but not following command, not really responding, difficult to assess  SKIN: No lesions noted.    LAB RESULTS:                        13.6   15.90 )-----------( 216      ( 10 Chato 2019 11:39 )             41.0     06-10    139  |  105  |  44<H>  ----------------------------<  109<H>  4.4   |  19  |  2.0<H>    Ca    8.1<L>      10 Chato 2019 11:39  Phos  6.4     06-10  Mg     2.6     06-10                  MICROBIOLOGY:        RADIOLOGY:    CT Head: 06/04/19    1.  Large left parietal/occipital extracalvarial hematoma. Slight   widening of the left lambdoid suture suspicious for nondisplaced   diastatic fracture.    2.  Subdural hemorrhage in the right frontotemporal convexity measuring   up to 8 mm in width and within the left cerebellopontine angle cistern   measuring 8 mm in width.    3.  Hemorrhagic contusions in the right frontal and temporal lobes and   left cerebellum.      4.  Scattered subarachnoid hemorrhage and small layering intraventricular   hemorrhage.    CT Head: 06/05/19    1.  Subdural hemorrhage over the right frontal and temporal convexities   and left cerebellopontine angle cistern, not significantly changed since   the prior study.    2.  Stable right frontal, right temporal and left cerebellar contusions.     3.  No midline shift. Stable ventricular size.    4.  Scattered subarachnoid hemorrhage and small intraventricular   hemorrhage are again noted.     5.  Redemonstration of large left parietal/occipital extracalvarial   hematoma and soft tissue swelling with slight widening of the left   lambdoid suture.    6.  Visualized paranasal sinuses and bilateral mastoid air cells are   well-aerated.          ALLERGIES:  No Known Allergies    HOME MEDICATIONS:  AMLODIPINE   TAB 2.5MG:  (04 Jun 2019 23:38)  ATENOLOL     TAB 25MG:  (04 Jun 2019 23:38)  FLUOROMETHOL ELLA 0.1% OP:  (04 Jun 2019 23:38)  MEMANTINE    TAB HCL 10MG:  (04 Jun 2019 23:38)  ROSUVASTATIN TAB 5MG:  (04 Jun 2019 23:38)  TAMSULOSIN   CAP 0.4MG:  (04 Jun 2019 23:38)  TIMOLOL MAL  SOL 0.5% OP:  (04 Jun 2019 23:38)    =========================================================== DAILY PROGRESS NOTE  ===========================================================    Patient Information:  ASCENCION LANGLEY  /  84y  /  Male  /  MRN#: [id number]    Hospital Day: 7d     |:::::::::::::::::::::::::::| SUBJECTIVE |:::::::::::::::::::::::::::|    OVERNIGHT EVENTS: Rapid response yesterday for unresponsiveness, had stat CT head->worsening ICH->Neurosx: no intervention, likely seizure->improved  TODAY: Patient was seen today at bedside.     |:::::::::::::::::::::::::::| OBJECTIVE |:::::::::::::::::::::::::::|    VITAL SIGNS: Last 24 Hours  T(C): 37.1 (11 Jun 2019 00:00), Max: 37.1 (11 Jun 2019 00:00)  T(F): 98.8 (11 Jun 2019 00:00), Max: 98.8 (11 Jun 2019 00:00)  HR: 75 (11 Jun 2019 05:59) (67 - 75)  BP: 167/71 (11 Jun 2019 05:59) (127/59 - 167/71)  BP(mean): --  ABP: --  ABP(mean): --  RR: 18 (11 Jun 2019 00:00) (18 - 20)  SpO2: 97% (11 Jun 2019 05:59) (97% - 97%)      PHYSICAL EXAM:  GENERAL: Patient was lying in bed, on O2 by NC  HEENT: Head NC/Traumatic/Fractures ; Conjunctivae clear, Sclera anicteric; Pupils were normal size and reactive to light. Oral mucosa moist.  CARDIO: Regular rate; Regular rhythm; S1 & S2.  RESP: Coarse b/l  GI: Distended, firm; +BS; No guarding; No rebound tenderness.  EXT: No edema in UE and LE.  NEURO: Moving extremities voluntarily but not following command, not really responding, difficult to assess  SKIN: No lesions noted.    Lee catheter:     LAB RESULTS:                        13.6   15.90 )-----------( 216      ( 10 Chato 2019 11:39 )             41.0                           12.4   13.26 )-----------( 230      ( 11 Jun 2019 06:17 )             37.5       06-10    139  |  105  |  44<H>  ----------------------------<  109<H>  4.4   |  19  |  2.0<H>    Ca    8.1<L>      10 Chato 2019 11:39  Phos  6.4     06-10  Mg     2.6     06-10                  MICROBIOLOGY:        RADIOLOGY:    CT Head: 06/04/19    1.  Large left parietal/occipital extracalvarial hematoma. Slight   widening of the left lambdoid suture suspicious for nondisplaced   diastatic fracture.    2.  Subdural hemorrhage in the right frontotemporal convexity measuring   up to 8 mm in width and within the left cerebellopontine angle cistern   measuring 8 mm in width.    3.  Hemorrhagic contusions in the right frontal and temporal lobes and   left cerebellum.      4.  Scattered subarachnoid hemorrhage and small layering intraventricular   hemorrhage.    CT Head: 06/05/19    1.  Subdural hemorrhage over the right frontal and temporal convexities   and left cerebellopontine angle cistern, not significantly changed since   the prior study.    2.  Stable right frontal, right temporal and left cerebellar contusions.     3.  No midline shift. Stable ventricular size.    4.  Scattered subarachnoid hemorrhage and small intraventricular   hemorrhage are again noted.     5.  Redemonstration of large left parietal/occipital extracalvarial   hematoma and soft tissue swelling with slight widening of the left   lambdoid suture.    6.  Visualized paranasal sinuses and bilateral mastoid air cells are   well-aerated.          ALLERGIES:  No Known Allergies    HOME MEDICATIONS:  AMLODIPINE   TAB 2.5MG:  (04 Jun 2019 23:38)  ATENOLOL     TAB 25MG:  (04 Jun 2019 23:38)  FLUOROMETHOL ELLA 0.1% OP:  (04 Jun 2019 23:38)  MEMANTINE    TAB HCL 10MG:  (04 Jun 2019 23:38)  ROSUVASTATIN TAB 5MG:  (04 Jun 2019 23:38)  TAMSULOSIN   CAP 0.4MG:  (04 Jun 2019 23:38)  TIMOLOL MAL  SOL 0.5% OP:  (04 Jun 2019 23:38)    =========================================================== DAILY PROGRESS NOTE  ===========================================================    Patient Information:  ASCENCION LANGLEY  /  84y  /  Male  /  MRN#: [id number]    Hospital Day: 7d     |:::::::::::::::::::::::::::| SUBJECTIVE |:::::::::::::::::::::::::::|    OVERNIGHT EVENTS: Rapid response yesterday for unresponsiveness, stat CT head->worsening ICH w/5 mm midline shift->Neurosx: no intervention, likely seizure->improved, Neuro: epilepsy consult/24 hr VEEG/Mannitol 1x/cont Keppra    TODAY: Patient was seen today at bedside. He remains confused, disoriented, not following command, moving all extremities, minimally verbal. ROS is very unreliable as pt is confused.    |:::::::::::::::::::::::::::| OBJECTIVE |:::::::::::::::::::::::::::|    VITAL SIGNS: Last 24 Hours    T(C): 36.6 (11 Jun 2019 08:05), Max: 37.1 (11 Jun 2019 00:00)  T(F): 97.9 (11 Jun 2019 08:05), Max: 98.8 (11 Jun 2019 00:00)  HR: 70 (11 Jun 2019 08:05) (67 - 75)  BP: 137/75 (11 Jun 2019 08:05) (127/59 - 167/71)  BP(mean): --  ABP: --  ABP(mean): --  RR: 20 (11 Jun 2019 08:05) (18 - 20)  SpO2: 97% (11 Jun 2019 07:56) (97% - 97%)    PHYSICAL EXAM:  GENERAL: Patient was lying in bed, on O2 by NC, sat 97%, awake, confused, disoriented, A&Ox0  HEENT: Head NC/Traumatic/Fractures ; Conjunctivae clear, Sclera anicteric; Pupils were normal size and reactive to light. Oral mucosa moist.  CARDIO: Regular rate; Regular rhythm; S1 & S2.  RESP: Coarse b/l  GI: Distended, firm; +BS; No guarding; No rebound tenderness.  EXT: No edema in UE and LE.  NEURO: Moving extremities voluntarily but not following command, responding inappropriately   SKIN: Ecchymosis in UEs    Lee catheter: draining clear yellow urine w/o blood    LAB RESULTS:                        13.6   15.90 )-----------( 216      ( 10 Chato 2019 11:39 )             41.0                           12.4   13.26 )-----------( 230      ( 11 Jun 2019 06:17 )             37.5       06-10    139  |  105  |  44<H>  ----------------------------<  109<H>  4.4   |  19  |  2.0<H>    Ca    8.1<L>      10 Chato 2019 11:39  Phos  6.4     06-10  Mg     2.6     06-10    06-11    145  |  106  |  33<H>  ----------------------------<  130<H>  3.8   |  23  |  1.1    Ca    8.2<L>      11 Jun 2019 06:17  Phos  3.7     06-11  Mg     2.6     06-10    TPro  5.4<L>  /  Alb  3.0<L>  /  TBili  0.7  /  DBili  x   /  AST  31  /  ALT  51<H>  /  AlkPhos  26<L>  06-11                MICROBIOLOGY:        RADIOLOGY:    CT Head: 06/04/19    1.  Large left parietal/occipital extracalvarial hematoma. Slight   widening of the left lambdoid suture suspicious for nondisplaced   diastatic fracture.    2.  Subdural hemorrhage in the right frontotemporal convexity measuring   up to 8 mm in width and within the left cerebellopontine angle cistern   measuring 8 mm in width.    3.  Hemorrhagic contusions in the right frontal and temporal lobes and   left cerebellum.      4.  Scattered subarachnoid hemorrhage and small layering intraventricular   hemorrhage.    CT Head: 06/05/19    1.  Subdural hemorrhage over the right frontal and temporal convexities   and left cerebellopontine angle cistern, not significantly changed since   the prior study.    2.  Stable right frontal, right temporal and left cerebellar contusions.     3.  No midline shift. Stable ventricular size.    4.  Scattered subarachnoid hemorrhage and small intraventricular   hemorrhage are again noted.     5.  Redemonstration of large left parietal/occipital extracalvarial   hematoma and soft tissue swelling with slight widening of the left   lambdoid suture.    6.  Visualized paranasal sinuses and bilateral mastoid air cells are   well-aerated.    CT Head 06/10/19    1.  Interval enlargement of a right frontoparietal hemorrhagic contusion   with increased mass effect upon the anterior horn of the right lateral   ventricle. There is new right to left midline shift of approximately 5 mm.    2.  Slight interval increase in size of a small amount of layering blood  within the lateral ventricles.    3.  Stable subdural hematomas over the right frontal temporal convexity   and left cerebellopontine angle cistern.    4.  Overall stable appearance of subarachnoid hemorrhage within the right   frontal and temporallobe sulci.    5.  No definite evidence of new parenchymal infarction.    6.  Stable left occipital extra-axial scalp hematoma. Decreased soft   tissue edema within the left parietal scalp. Stable mild diastasis of the   left lambdoid suture.        ALLERGIES:  No Known Allergies    HOME MEDICATIONS:  AMLODIPINE   TAB 2.5MG:  (04 Jun 2019 23:38)  ATENOLOL     TAB 25MG:  (04 Jun 2019 23:38)  FLUOROMETHOL ELLA 0.1% OP:  (04 Jun 2019 23:38)  MEMANTINE    TAB HCL 10MG:  (04 Jun 2019 23:38)  ROSUVASTATIN TAB 5MG:  (04 Jun 2019 23:38)  TAMSULOSIN   CAP 0.4MG:  (04 Jun 2019 23:38)  TIMOLOL MAL  SOL 0.5% OP:  (04 Jun 2019 23:38)    ===========================================================

## 2019-06-11 NOTE — PROGRESS NOTE ADULT - SUBJECTIVE AND OBJECTIVE BOX
Neurology consult    ASCENCION LANGLEY84yMale    HPI:  84y m  TRAUMA ACTIVATION LEVEL:  Trauma alert    MECHANISM OF INJURY: s/p unwitnessed fall (+HT, ?LOC, -AC)     [] Blunt               [] MVC	                       [X] Fall	                 [] Pedestrian Struck	     [] Motorcycle    [] Assault                       [] Bicycle collision  [] Sports injury     [] Penetrating    [] Gun Shot Wound    [] Stab Wound    GCS: 	15  E: 4	V: 5	M: 6    HPI:   84y old male, BIBEMS, s/p unwitnessed fall (+HT, ?LOC, -AC); patient states he fell in his house, in his kitchen. No LOC. Noted by daughter to have bruising to head. Daughter called her father this morning states that he was acting strange. He said he was tired and went back to sleep. This was at 10am and very unusual for the pt as per daughter because pt rises early in morning and does not oversleep. Daughter then drove from [place] to her fathers home and noted pt has large contusion to his scalp. Daughter then brought pt to the ED for evaluation No nausea, no vomiting. No seizure activity. No blood thinners. No recent illness, no fever, no chest pain or shortness of breath. No abdominal pain.    PAST MEDICAL & SURGICAL HISTORY:  HTN, HLD, BPH, Glaucoma    Home Meds: Home Medications:  AMLODIPINE   TAB 2.5MG:  (04 Jun 2019 23:38)  ATENOLOL     TAB 25MG:  (04 Jun 2019 23:38)  FLUOROMETHOL ELLA 0.1% OP:  (04 Jun 2019 23:38)  MEMANTINE    TAB HCL 10MG:  (04 Jun 2019 23:38)  ROSUVASTATIN TAB 5MG:  (04 Jun 2019 23:38)  TAMSULOSIN   CAP 0.4MG:  (04 Jun 2019 23:38)  TIMOLOL MAL  SOL 0.5% OP:  (04 Jun 2019 23:38)    Allergies: AllergiesNo Known Allergies    Advanced Directives: Presumed Full Code (05 Jun 2019 00:06)          MEDICATIONS    acetaminophen   Tablet .. 650 milliGRAM(s) Oral every 6 hours  ATENolol  Tablet 25 milliGRAM(s) Oral daily  atorvastatin 20 milliGRAM(s) Oral at bedtime  chlorhexidine 4% Liquid 1 Application(s) Topical <User Schedule>  fluorometholone 0.1% Suspension 1 Drop(s) Both EYES daily  levETIRAcetam  IVPB 500 milliGRAM(s) IV Intermittent every 12 hours  memantine 10 milliGRAM(s) Oral daily  NIFEdipine XL 30 milliGRAM(s) Oral daily  pantoprazole    Tablet 40 milliGRAM(s) Oral before breakfast  sodium chloride 0.9%. 1000 milliLiter(s) IV Continuous <Continuous>  tamsulosin 0.4 milliGRAM(s) Oral at bedtime  timolol 0.5% Solution 1 Drop(s) Both EYES daily      PAST MEDICAL & SURGICAL HISTORY:       Family history: No history of dementia, strokes, or seizures   FAMILY HISTORY:    SOCIAL HISTORY --     Allergies    No Known Allergies    Intolerances            Vital Signs Last 24 Hrs  T(C): 36.6 (11 Jun 2019 08:05), Max: 37.1 (11 Jun 2019 00:00)  T(F): 97.9 (11 Jun 2019 08:05), Max: 98.8 (11 Jun 2019 00:00)  HR: 70 (11 Jun 2019 08:05) (67 - 75)  BP: 137/75 (11 Jun 2019 08:05) (127/59 - 167/71)  BP(mean): --  RR: 20 (11 Jun 2019 08:05) (18 - 20)  SpO2: 97% (11 Jun 2019 07:56) (97% - 97%)    SUBDURAL HEMATOMA DUE TO CONCUSSION WITH LOC, SEQUELA;FALL  ^POSS STROKE LAST NIGHT  Yes  Handoff  MEWS Score  Subdural hematoma due to concussion, with loss of consciousness, sequela  Subdural hematoma due to concussion, with loss of consciousness, sequela  POSS STROKE LAST NIGHT  Subarachnoid hemorrhage, traumatic  Fall         LABS:  CBC Full  -  ( 11 Jun 2019 06:17 )  WBC Count : 13.26 K/uL  RBC Count : 4.22 M/uL  Hemoglobin : 12.4 g/dL        06-11    145  |  106  |  33<H>  ----------------------------<  130<H>  3.8   |  23  |  1.1    Ca    8.2<L>      11 Jun 2019 06:17

## 2019-06-11 NOTE — DIETITIAN INITIAL EVALUATION ADULT. - ENERGY NEEDS
Calories: 4347-2159 kcal/day (MSJ x 1-1.2 AF)--advanced age considered  Protein: 72-88 gm/day (0.9-1.1 gm/kg CBW)  Fluid: 1 ml/kcal

## 2019-06-11 NOTE — DIETITIAN INITIAL EVALUATION ADULT. - OTHER INFO
Pt BIBEMS, after unwitnessed fall (+HT, -LOC, -AC) and was found to have subdural/subarachnoid hemorrhage, parietal/occipital extracalvarial hematoma and frontal/temporal/cerebellar hemorrhages.  He was downgraded from SICU and transferred to Medicine for medical optimization after hospital course was complicated by worsening leukocytosis, mental status and ALPHONSO on 06/10. Neurosx: no interventions, likely seizure. PT eval pending improvement. Reason for Assessment: LOS

## 2019-06-11 NOTE — PROVIDER CONTACT NOTE (OTHER) - ACTION/TREATMENT ORDERED:
MD aware, no further intervention at this time
MD made aware
MD will come up and assess pt.
provider stated meds can be mixed with a small amount of pudding and he must be supervised.
provider stated that she is not concerned about c/diff at this time and to continue to monitor and document
provider stated that she will come and speak with family
provider to renew Lee
provider aware and assessed. pt at bedside. no further intervention at this time
provider aware. no further intervention at this time
provider stated to keep pt on 3L and she will come to assess pt with team

## 2019-06-11 NOTE — DIETITIAN INITIAL EVALUATION ADULT. - FACTORS AFF FOOD INTAKE
daughter has recently noticed that pt has been pocketing food in his cheeks and does better with liquids. Pt needs 1:1 feeds. no GI distress noted, LBM 6/10; Per SLP recs on 6/11--PO trials not appropriate at this time 2' pt unable to sustain arousal > 2-3 seconds. NPO w/ alt means of nutrition/hydration./other (specify)

## 2019-06-12 LAB
ALBUMIN SERPL ELPH-MCNC: 3 G/DL — LOW (ref 3.5–5.2)
ALP SERPL-CCNC: 24 U/L — LOW (ref 30–115)
ALT FLD-CCNC: 57 U/L — HIGH (ref 0–41)
ANION GAP SERPL CALC-SCNC: 13 MMOL/L — SIGNIFICANT CHANGE UP (ref 7–14)
AST SERPL-CCNC: 35 U/L — SIGNIFICANT CHANGE UP (ref 0–41)
BASOPHILS # BLD AUTO: 0.04 K/UL — SIGNIFICANT CHANGE UP (ref 0–0.2)
BASOPHILS NFR BLD AUTO: 0.4 % — SIGNIFICANT CHANGE UP (ref 0–1)
BILIRUB SERPL-MCNC: 0.8 MG/DL — SIGNIFICANT CHANGE UP (ref 0.2–1.2)
BUN SERPL-MCNC: 19 MG/DL — SIGNIFICANT CHANGE UP (ref 10–20)
CALCIUM SERPL-MCNC: 8.1 MG/DL — LOW (ref 8.5–10.1)
CHLORIDE SERPL-SCNC: 107 MMOL/L — SIGNIFICANT CHANGE UP (ref 98–110)
CO2 SERPL-SCNC: 21 MMOL/L — SIGNIFICANT CHANGE UP (ref 17–32)
CREAT SERPL-MCNC: 0.7 MG/DL — SIGNIFICANT CHANGE UP (ref 0.7–1.5)
EOSINOPHIL # BLD AUTO: 0.4 K/UL — SIGNIFICANT CHANGE UP (ref 0–0.7)
EOSINOPHIL NFR BLD AUTO: 3.6 % — SIGNIFICANT CHANGE UP (ref 0–8)
GLUCOSE SERPL-MCNC: 119 MG/DL — HIGH (ref 70–99)
HCT VFR BLD CALC: 36.9 % — LOW (ref 42–52)
HGB BLD-MCNC: 12.2 G/DL — LOW (ref 14–18)
IMM GRANULOCYTES NFR BLD AUTO: 1.2 % — HIGH (ref 0.1–0.3)
LEVETIRACETAM SERPL-MCNC: 16.8 MCG/ML — SIGNIFICANT CHANGE UP (ref 12–46)
LYMPHOCYTES # BLD AUTO: 1.58 K/UL — SIGNIFICANT CHANGE UP (ref 1.2–3.4)
LYMPHOCYTES # BLD AUTO: 14 % — LOW (ref 20.5–51.1)
MAGNESIUM SERPL-MCNC: 1.9 MG/DL — SIGNIFICANT CHANGE UP (ref 1.8–2.4)
MCHC RBC-ENTMCNC: 29.3 PG — SIGNIFICANT CHANGE UP (ref 27–31)
MCHC RBC-ENTMCNC: 33.1 G/DL — SIGNIFICANT CHANGE UP (ref 32–37)
MCV RBC AUTO: 88.5 FL — SIGNIFICANT CHANGE UP (ref 80–94)
MONOCYTES # BLD AUTO: 0.99 K/UL — HIGH (ref 0.1–0.6)
MONOCYTES NFR BLD AUTO: 8.8 % — SIGNIFICANT CHANGE UP (ref 1.7–9.3)
NEUTROPHILS # BLD AUTO: 8.12 K/UL — HIGH (ref 1.4–6.5)
NEUTROPHILS NFR BLD AUTO: 72 % — SIGNIFICANT CHANGE UP (ref 42.2–75.2)
NRBC # BLD: 0 /100 WBCS — SIGNIFICANT CHANGE UP (ref 0–0)
PHOSPHATE SERPL-MCNC: 3 MG/DL — SIGNIFICANT CHANGE UP (ref 2.1–4.9)
PLATELET # BLD AUTO: 213 K/UL — SIGNIFICANT CHANGE UP (ref 130–400)
POTASSIUM SERPL-MCNC: 3.6 MMOL/L — SIGNIFICANT CHANGE UP (ref 3.5–5)
POTASSIUM SERPL-SCNC: 3.6 MMOL/L — SIGNIFICANT CHANGE UP (ref 3.5–5)
PROCALCITONIN SERPL-MCNC: 0.16 NG/ML — HIGH (ref 0.02–0.1)
PROT SERPL-MCNC: 5.3 G/DL — LOW (ref 6–8)
RBC # BLD: 4.17 M/UL — LOW (ref 4.7–6.1)
RBC # FLD: 14 % — SIGNIFICANT CHANGE UP (ref 11.5–14.5)
SODIUM SERPL-SCNC: 141 MMOL/L — SIGNIFICANT CHANGE UP (ref 135–146)
WBC # BLD: 11.26 K/UL — HIGH (ref 4.8–10.8)
WBC # FLD AUTO: 11.26 K/UL — HIGH (ref 4.8–10.8)

## 2019-06-12 PROCEDURE — 95951: CPT | Mod: 26

## 2019-06-12 PROCEDURE — 99497 ADVNCD CARE PLAN 30 MIN: CPT | Mod: 25

## 2019-06-12 PROCEDURE — 99233 SBSQ HOSP IP/OBS HIGH 50: CPT

## 2019-06-12 PROCEDURE — 99221 1ST HOSP IP/OBS SF/LOW 40: CPT

## 2019-06-12 RX ORDER — SODIUM CHLORIDE 9 MG/ML
1000 INJECTION, SOLUTION INTRAVENOUS
Refills: 0 | Status: DISCONTINUED | OUTPATIENT
Start: 2019-06-12 | End: 2019-06-14

## 2019-06-12 RX ORDER — LABETALOL HCL 100 MG
100 TABLET ORAL
Refills: 0 | Status: DISCONTINUED | OUTPATIENT
Start: 2019-06-12 | End: 2019-06-13

## 2019-06-12 RX ORDER — DILTIAZEM HCL 120 MG
180 CAPSULE, EXT RELEASE 24 HR ORAL DAILY
Refills: 0 | Status: DISCONTINUED | OUTPATIENT
Start: 2019-06-12 | End: 2019-06-12

## 2019-06-12 RX ORDER — PANTOPRAZOLE SODIUM 20 MG/1
40 TABLET, DELAYED RELEASE ORAL DAILY
Refills: 0 | Status: DISCONTINUED | OUTPATIENT
Start: 2019-06-12 | End: 2019-06-14

## 2019-06-12 RX ADMIN — ATORVASTATIN CALCIUM 20 MILLIGRAM(S): 80 TABLET, FILM COATED ORAL at 21:43

## 2019-06-12 RX ADMIN — Medication 650 MILLIGRAM(S): at 12:31

## 2019-06-12 RX ADMIN — Medication 650 MILLIGRAM(S): at 17:17

## 2019-06-12 RX ADMIN — LEVETIRACETAM 420 MILLIGRAM(S): 250 TABLET, FILM COATED ORAL at 05:55

## 2019-06-12 RX ADMIN — Medication 10 MILLIGRAM(S): at 05:54

## 2019-06-12 RX ADMIN — SODIUM CHLORIDE 75 MILLILITER(S): 9 INJECTION, SOLUTION INTRAVENOUS at 17:17

## 2019-06-12 RX ADMIN — Medication 100 MILLIGRAM(S): at 17:14

## 2019-06-12 RX ADMIN — ATENOLOL 25 MILLIGRAM(S): 25 TABLET ORAL at 05:53

## 2019-06-12 RX ADMIN — Medication 1 DROP(S): at 12:11

## 2019-06-12 RX ADMIN — CHLORHEXIDINE GLUCONATE 1 APPLICATION(S): 213 SOLUTION TOPICAL at 10:07

## 2019-06-12 RX ADMIN — FLUOROMETHOLONE 1 DROP(S): 1 SOLUTION/ DROPS OPHTHALMIC at 12:11

## 2019-06-12 RX ADMIN — TAMSULOSIN HYDROCHLORIDE 0.4 MILLIGRAM(S): 0.4 CAPSULE ORAL at 21:43

## 2019-06-12 RX ADMIN — SODIUM CHLORIDE 75 MILLILITER(S): 9 INJECTION INTRAMUSCULAR; INTRAVENOUS; SUBCUTANEOUS at 08:31

## 2019-06-12 RX ADMIN — LEVETIRACETAM 420 MILLIGRAM(S): 250 TABLET, FILM COATED ORAL at 17:24

## 2019-06-12 RX ADMIN — MEMANTINE HYDROCHLORIDE 10 MILLIGRAM(S): 10 TABLET ORAL at 12:11

## 2019-06-12 RX ADMIN — Medication 650 MILLIGRAM(S): at 12:11

## 2019-06-12 RX ADMIN — PANTOPRAZOLE SODIUM 40 MILLIGRAM(S): 20 TABLET, DELAYED RELEASE ORAL at 10:08

## 2019-06-12 RX ADMIN — Medication 650 MILLIGRAM(S): at 05:54

## 2019-06-12 NOTE — PROGRESS NOTE ADULT - ATTENDING COMMENTS
awaiting neuro for dispo
head bleed   NS evaluation sicu Care
head bleed non operative management   continue SICU Care
pt continues to have waxing waning mental status  awaiting placement
s/p fall with head bleed   stable   rehab eval   dispo planning
Agree with resident's note, HPI, PE, assessment and plan.  Pt was seen and examined independently.     #Progress Note Handoff  Pending clinical improvement  Consults: palliative f/u, SLP f/u  Family Discussion: discussed with sister in law at bedside, family hope for improvement at this point  Future Disposition: not known at this time
n: tbi with stable sdh sah on ct, waxing waning delerium  CAM positive  p: no diagnoses  htn on home meds  hld on statin  c: no dx  gi: no dx on DASH  ppi  senna  gu: retention de la fuente  started flowmax  plan de la fuente removal Friday  h: no dx  id: no dx  e: no dx, d/c finger sticks  ms: oob with assistance  all extremity xr read negative for acute fx    downgrade
n: sdh sah, skull fx, stable head ct per maricruz wong, neuro monitoring q1h  syncope assessment    p: no dx    c: ntc, htn at home, home meds    gi: risk of dysphagia, currently no restrictions    gu: urinary retention, de la fuente placed  continue flowmax  will keep de la fuente 48h    h: no dx    id: no dx    e: hyperlip on statin from home    ms: review plane films of extremities, but low suspicion based on exam  oobtc or ambulate, aat    over 30 minutes of diresct critical care provided to this patient
Patient seen and examined at bedside. He was lethargic. Able to track with eyes and verbalize minimally momentarily. According to sister at bedside, patient was coherent earlier in the day, therefore she was hopeful he was improving. Daughter (HCP) at the bedside as well. Explained to the sister and daughter that patient's mental status waxes and wanes. He has not improved neurologically, in fact imaging indicates worsening of his hemorrhage. Explained to the family that his prognosis is grave and a meaningful neurological recovery is unlikely. Daughter (HCP) in agreement. Discussed palliative care and daughter agreed. If video EEG is negative which is likely, then his mental status changes are due to his hemorrhage.     #Progress Note Handoff  Pending (specify):  Consults_Palliative care________, Tests__VEEG______, Test Results_______, Other_________  Family discussion: dw daughter (HCP) and sister at bedside   Disposition: Home___/SNF_x__/Other________/Unknown at this time________

## 2019-06-12 NOTE — PROGRESS NOTE ADULT - SUBJECTIVE AND OBJECTIVE BOX
DAILY PROGRESS NOTE  ===========================================================    Patient Information:  ASCENCION LANGLEY  /  84y  /  Male  /  MRN#: [id number]    Hospital Day: 8d     |:::::::::::::::::::::::::::| SUBJECTIVE |:::::::::::::::::::::::::::|    OVERNIGHT EVENTS: No overnight events    TODAY: Patient was seen today at bedside. He remains confused, disoriented, waxing and waning mental status, not following command, moving all extremities, minimally verbal. ROS unable to do    |:::::::::::::::::::::::::::| OBJECTIVE |:::::::::::::::::::::::::::|    VITAL SIGNS: Last 24 Hours  T(C): 36.1 (12 Jun 2019 09:18), Max: 37.2 (11 Jun 2019 22:30)  T(F): 97 (12 Jun 2019 09:18), Max: 99 (11 Jun 2019 22:30)  HR: 61 (12 Jun 2019 09:18) (61 - 73)  BP: 90/54 (12 Jun 2019 09:18) (90/54 - 174/74)  BP(mean): --  ABP: --  ABP(mean): --  RR: 20 (12 Jun 2019 09:18) (20 - 20)  SpO2: 93% (12 Jun 2019 10:00) (93% - 93%)    PHYSICAL EXAM:  GENERAL: Patient was lying in bed, on O2 by NC, sat 93%, awake, confused, disoriented, A&Ox0  HEENT: Head NC/Traumatic/Fractures ; Conjunctivae clear, Sclera anicteric; Pupils were normal size and reactive to light. Oral mucosa moist.  CARDIO: Regular rate; Regular rhythm; S1 & S2.  RESP: Coarse b/l  GI: Distended, firm; +BS; No guarding; No rebound tenderness.  EXT: No edema in UE and LE.  NEURO: Moving extremities voluntarily but not following command, responding inappropriately   SKIN: Ecchymosis in UEs    Lee catheter: draining clear yellow urine w/o blood    LAB RESULTS:                            12.2   11.26 )-----------( 213      ( 12 Jun 2019 06:23 )             36.9     06-12    141  |  107  |  19  ----------------------------<  119<H>  3.6   |  21  |  0.7    Ca    8.1<L>      12 Jun 2019 06:23  Phos  3.0     06-12  Mg     1.9     06-12    TPro  5.3<L>  /  Alb  3.0<L>  /  TBili  0.8  /  DBili  x   /  AST  35  /  ALT  57<H>  /  AlkPhos  24<L>  06-12    MICROBIOLOGY:        RADIOLOGY:    CT Head: 06/04/19    1.  Large left parietal/occipital extracalvarial hematoma. Slight   widening of the left lambdoid suture suspicious for nondisplaced   diastatic fracture.    2.  Subdural hemorrhage in the right frontotemporal convexity measuring   up to 8 mm in width and within the left cerebellopontine angle cistern   measuring 8 mm in width.    3.  Hemorrhagic contusions in the right frontal and temporal lobes and   left cerebellum.      4.  Scattered subarachnoid hemorrhage and small layering intraventricular   hemorrhage.    CT Head: 06/05/19    1.  Subdural hemorrhage over the right frontal and temporal convexities   and left cerebellopontine angle cistern, not significantly changed since   the prior study.    2.  Stable right frontal, right temporal and left cerebellar contusions.     3.  No midline shift. Stable ventricular size.    4.  Scattered subarachnoid hemorrhage and small intraventricular   hemorrhage are again noted.     5.  Redemonstration of large left parietal/occipital extracalvarial   hematoma and soft tissue swelling with slight widening of the left   lambdoid suture.    6.  Visualized paranasal sinuses and bilateral mastoid air cells are   well-aerated.    CT Head 06/10/19    1.  Interval enlargement of a right frontoparietal hemorrhagic contusion   with increased mass effect upon the anterior horn of the right lateral   ventricle. There is new right to left midline shift of approximately 5 mm.    2.  Slight interval increase in size of a small amount of layering blood  within the lateral ventricles.    3.  Stable subdural hematomas over the right frontal temporal convexity   and left cerebellopontine angle cistern.    4.  Overall stable appearance of subarachnoid hemorrhage within the right   frontal and temporallobe sulci.    5.  No definite evidence of new parenchymal infarction.    6.  Stable left occipital extra-axial scalp hematoma. Decreased soft   tissue edema within the left parietal scalp. Stable mild diastasis of the   left lambdoid suture.        ALLERGIES:  No Known Allergies    HOME MEDICATIONS:  AMLODIPINE   TAB 2.5MG:  (04 Jun 2019 23:38)  ATENOLOL     TAB 25MG:  (04 Jun 2019 23:38)  FLUOROMETHOL ELLA 0.1% OP:  (04 Jun 2019 23:38)  MEMANTINE    TAB HCL 10MG:  (04 Jun 2019 23:38)  ROSUVASTATIN TAB 5MG:  (04 Jun 2019 23:38)  TAMSULOSIN   CAP 0.4MG:  (04 Jun 2019 23:38)  TIMOLOL MAL  SOL 0.5% OP:  (04 Jun 2019 23:38)    ===========================================================

## 2019-06-12 NOTE — CONSULT NOTE ADULT - ASSESSMENT
84yMale being evaluated for goals of care / symptoms management     Patient is a 83 y/o male with PMH as above who was admitted s/p unwitnessed fall and was found to have subdural/subarachnoid hemorrhage, parietal/occipital extracalvarial hematoma and frontal/temporal/cerebellar hemorrhages. Patient's hospitalization was complicated by ALPHONSO, worsening leukocytosis, rapid response 2/2 unresponsiveness, repeat CT head showed worsening hemorrhage w/midline shift of 5 mm. Patient's mentation waxes and wanes, and at time of consult patient was arousable but not able to follow command. Palliative team spoke with daughter Angeline Vázquez on [phone] to discuss overall goals. Palliative NP reviewed clinical condition, and treatment options. Daughter (HCP)           Recommendations: 84yMale being evaluated for goals of care / symptoms management     Patient is a 85 y/o male with PMH as above who was admitted s/p unwitnessed fall and was found to have subdural/subarachnoid hemorrhage, parietal/occipital extracalvarial hematoma and frontal/temporal/cerebellar hemorrhages. Patient's hospitalization was complicated by ALPHONSO, worsening leukocytosis, rapid response 2/2 unresponsiveness, repeat CT head showed worsening hemorrhage w/midline shift of 5 mm. Patient's mentation waxes and wanes, and at time of consult patient was awake but not able to follow command. Palliative team spoke with daughter Angeline Vázquez on [phone] to discuss overall goals. Clinical condition was reviewed and treatment options discussed. Daughter (HCP) is aware of patient's prognosis and is hopeful that patient's status will improve back to baseline to participate in rehab, but she is also realistic that status might further decline or become new baseline and at that time will opt for hospice in a facility. Daughter requested to continue with ongoing medical management until next week Tuesday. Palliative team will continue to provide supportive care.           Recommendations:  Ongoing medical management   DNI/DNR  Will re-evaluate next week  We will f/u

## 2019-06-12 NOTE — PROGRESS NOTE ADULT - SUBJECTIVE AND OBJECTIVE BOX
Epilepsy Attending Note:     ASCENCION LANGLEY    84y Male  MRN [id number]    Vital Signs Last 24 Hrs  T(C): 36.1 (12 Jun 2019 09:18), Max: 37.2 (11 Jun 2019 22:30)  T(F): 97 (12 Jun 2019 09:18), Max: 99 (11 Jun 2019 22:30)  HR: 61 (12 Jun 2019 09:18) (61 - 73)  BP: 90/54 (12 Jun 2019 09:18) (90/54 - 174/74)  BP(mean): --  RR: 20 (12 Jun 2019 09:18) (20 - 20)  SpO2: --                          12.2   11.26 )-----------( 213      ( 12 Jun 2019 06:23 )             36.9       06-12    141  |  107  |  19  ----------------------------<  119<H>  3.6   |  21  |  0.7    Ca    8.1<L>      12 Jun 2019 06:23  Phos  3.0     06-12  Mg     1.9     06-12    TPro  5.3<L>  /  Alb  3.0<L>  /  TBili  0.8  /  DBili  x   /  AST  35  /  ALT  57<H>  /  AlkPhos  24<L>  06-12      MEDICATIONS  (STANDING):  acetaminophen   Tablet .. 650 milliGRAM(s) Oral every 6 hours  ATENolol  Tablet 25 milliGRAM(s) Oral daily  atorvastatin 20 milliGRAM(s) Oral at bedtime  chlorhexidine 4% Liquid 1 Application(s) Topical <User Schedule>  fluorometholone 0.1% Suspension 1 Drop(s) Both EYES daily  levETIRAcetam  IVPB 500 milliGRAM(s) IV Intermittent every 12 hours  memantine 10 milliGRAM(s) Oral daily  NIFEdipine IR 10 milliGRAM(s) Oral three times a day  pantoprazole   Suspension 40 milliGRAM(s) Oral daily  sodium chloride 0.9%. 1000 milliLiter(s) (75 mL/Hr) IV Continuous <Continuous>  tamsulosin 0.4 milliGRAM(s) Oral at bedtime  timolol 0.5% Solution 1 Drop(s) Both EYES daily    MEDICATIONS  (PRN):        VEEG in the last 24 hours:    Background - 8-9 Hz superimposed by small amount of low amplitude theta    Focal and generalized slowing - borderline generalized slowing                                               - mild right hemispheric focal slowing  	  Interictal activity - none    Events - none    Seizures - none    Impression: Abnormal VEEG due to above    Plan -   - finding discussed with Dr. Patel  - Mikie d/c VEEG

## 2019-06-12 NOTE — CONSULT NOTE ADULT - CONSULT REASON
ALPHONSO
Goals of Care/Symptoms Management
abn. EEG
eval for sz
q1h Neurochecks for SDH/SAH s/p fall
sdh
s/p Fall

## 2019-06-12 NOTE — PROGRESS NOTE ADULT - ASSESSMENT
83 YO M DNR;/DNI, w/PMHx of Dementia, unsteady gait/falls, HTN, DLD, BPH, Glaucoma, was  BIBEMS, after unwitnessed fall (+HT, -LOC, -AC) and was found to have subdural/subarachnoid hemorrhage, parietal/occipital extracalvarial hematoma and frontal/temporal/cerebellar hemorrhages. He was downgraded from SICU and transferred to Medicine for medical optimization after hospital course was complicated by worsening leukocytosis, mental status and ALPHONSO on 06/10/19    #S/P unwitnessed fall/SDH/SAH/Frontal and Temporal hemorrhagic Contusion/Diastatic Nondisplaced Fracture of Cerebellun: not improving  - Neurosx: no interventions, likely seizure  - Neuro on board  - VEEG: focal/generalized slowing  - CT head 06/10/19: Worsening hemorrhage w/midline shift of 5 mm  - Syncope w/u: negative  - Physiatry: STR in STR  - PT eval pending improvement  - Speech and swallow reeval    #AMS: not improving/waxing and waning  - CT head worse w/midline shift  - Monitor vitals    #ALPHONSO resolved  - Cr 2.0->1.1-0.7  - D/c de la fuente, TOV  - US Kidneys/Bladder: unremarkable  - Cont IV hydration  - Strict Is&Os, Weight QD  - Avoid nephrotoxic meds  - Nephro following    #Leukocytosis unknown etiology: down trending  - Sepsis w/u negative so far  - CXR no acute pathology per official read    #BPH  - Cont Tamsulosin 0.4 qhs  - D/c de la fuente, TOV    #HTN: Low BP  - Cont Amlodipine/Atenolol  - Monitor BP closely    #DLD  - Cont Lipitor    #Dementia:   - Cont current meds    #DVT ppx: SCD  #Activity: ambulate w/assistance  #Diet: NPO for now  #Dispo: Acute  #DNR/DNI 85 YO M DNR;/DNI, w/PMHx of Dementia, unsteady gait/falls, HTN, DLD, BPH, Glaucoma, was  BIBEMS, after unwitnessed fall (+HT, -LOC, -AC) and was found to have subdural/subarachnoid hemorrhage, parietal/occipital extracalvarial hematoma and frontal/temporal/cerebellar hemorrhages. He was downgraded from SICU and transferred to Medicine for medical optimization after hospital course was complicated by worsening leukocytosis, mental status and ALPHONSO on 06/10/19    #S/P unwitnessed fall/SDH/SAH/Frontal and Temporal hemorrhagic Contusion/Diastatic Nondisplaced Fracture of Cerebellun: not improving  - CT head worse w/midline shift, seen by NSx - no interventions planned  - pt is a high risk for sudden worsening of condition including death, high risk for seizure   - Neurosx: no interventions  - Neuro on board  - VEEG: focal/generalized slowing  - CT head 06/10/19: Worsening hemorrhage w/midline shift of 5 mm  - Syncope w/u: negative  - Physiatry: STR in STR  - PT eval pending improvement  - Speech and swallow reeval    #ALPHONSO resolved  - Cr 2.0->1.1-0.7  - D/c de la fuente, TOV  - US Kidneys/Bladder: unremarkable  - Cont IV hydration  - Strict Is&Os, Weight QD  - Avoid nephrotoxic meds  - Nephro following    #Leukocytosis unknown etiology: down trending  - Sepsis w/u negative so far  - CXR no acute pathology per official read    #BPH  - Cont Tamsulosin 0.4 qhs  - D/c de la fuente, TOV    #HTN: Low BP  - Cont Amlodipine/Atenolol  - Monitor BP closely    #DLD  - Cont Lipitor    #Dementia:   - Cont current meds    #DVT ppx: SCD  #Activity: ambulate w/assistance  #Diet: NPO for now  #Dispo: Acute  #DNR/DNI

## 2019-06-12 NOTE — CONSULT NOTE ADULT - SUBJECTIVE AND OBJECTIVE BOX
84yMale with diagnosis: SUBDURAL HEMATOMA DUE TO CONCUSSION WITH LOC, SEQUELA;FALL      Patient seen, sleeping/ arousable but not following command     PHYSICAL EXAM    T(C): , Max: 37.2 (22:30)  T(F): 97  HR: 64 (61 - 73)  BP: 129/65 (90/54 - 174/74)  RR: 20 (20 - 20)  SpO2: 93% (93% - 93%)                                    12.2   11.26 )-----------( 213      ( 12 Jun 2019 06:23 )             36.9                                                                                      06-12    141  |  107  |  19  ----------------------------<  119<H>  3.6   |  21  |  0.7    Ca    8.1<L>      12 Jun 2019 06:23  Phos  3.0     06-12  Mg     1.9     06-12    TPro  5.3<L>  /  Alb  3.0<L>  /  TBili  0.8  /  DBili  x   /  AST  35  /  ALT  57<H>  /  AlkPhos  24<L>  06-12                                                      MEDICATIONS  (STANDING):  acetaminophen   Tablet .. 650 milliGRAM(s) Oral every 6 hours  ATENolol  Tablet 25 milliGRAM(s) Oral daily  atorvastatin 20 milliGRAM(s) Oral at bedtime  chlorhexidine 4% Liquid 1 Application(s) Topical <User Schedule>  fluorometholone 0.1% Suspension 1 Drop(s) Both EYES daily  levETIRAcetam  IVPB 500 milliGRAM(s) IV Intermittent every 12 hours  memantine 10 milliGRAM(s) Oral daily  NIFEdipine IR 10 milliGRAM(s) Oral three times a day  pantoprazole   Suspension 40 milliGRAM(s) Oral daily  sodium chloride 0.9%. 1000 milliLiter(s) (75 mL/Hr) IV Continuous <Continuous>  tamsulosin 0.4 milliGRAM(s) Oral at bedtime  timolol 0.5% Solution 1 Drop(s) Both EYES daily    MEDICATIONS  (PRN): 84yMale with diagnosis: SUBDURAL HEMATOMA DUE TO CONCUSSION WITH LOC, SEQUELA;FALL      Patient seen, awake and sitting in a chair, not following command. NAD      PHYSICAL EXAM    T(C): , Max: 37.2 (22:30)  T(F): 97  HR: 64 (61 - 73)  BP: 129/65 (90/54 - 174/74)  RR: 20 (20 - 20)  SpO2: 93% (93% - 93%)                                    12.2   11.26 )-----------( 213      ( 12 Jun 2019 06:23 )             36.9                                                                                      06-12    141  |  107  |  19  ----------------------------<  119<H>  3.6   |  21  |  0.7    Ca    8.1<L>      12 Jun 2019 06:23  Phos  3.0     06-12  Mg     1.9     06-12    TPro  5.3<L>  /  Alb  3.0<L>  /  TBili  0.8  /  DBili  x   /  AST  35  /  ALT  57<H>  /  AlkPhos  24<L>  06-12                                                      MEDICATIONS  (STANDING):  acetaminophen   Tablet .. 650 milliGRAM(s) Oral every 6 hours  ATENolol  Tablet 25 milliGRAM(s) Oral daily  atorvastatin 20 milliGRAM(s) Oral at bedtime  chlorhexidine 4% Liquid 1 Application(s) Topical <User Schedule>  fluorometholone 0.1% Suspension 1 Drop(s) Both EYES daily  levETIRAcetam  IVPB 500 milliGRAM(s) IV Intermittent every 12 hours  memantine 10 milliGRAM(s) Oral daily  NIFEdipine IR 10 milliGRAM(s) Oral three times a day  pantoprazole   Suspension 40 milliGRAM(s) Oral daily  sodium chloride 0.9%. 1000 milliLiter(s) (75 mL/Hr) IV Continuous <Continuous>  tamsulosin 0.4 milliGRAM(s) Oral at bedtime  timolol 0.5% Solution 1 Drop(s) Both EYES daily    MEDICATIONS  (PRN):

## 2019-06-12 NOTE — CONSULT NOTE ADULT - CONSULT REQUESTED DATE/TIME
04-Jun-2019 23:41
08-Jun-2019 16:30
10-Chato-2019
10-Chato-2019 15:39
12-Jun-2019 12:28
07-Jun-2019 13:00
04-Jun-2019 20:58

## 2019-06-13 LAB
ALBUMIN SERPL ELPH-MCNC: 3 G/DL — LOW (ref 3.5–5.2)
ALP SERPL-CCNC: 24 U/L — LOW (ref 30–115)
ALT FLD-CCNC: 50 U/L — HIGH (ref 0–41)
ANION GAP SERPL CALC-SCNC: 11 MMOL/L — SIGNIFICANT CHANGE UP (ref 7–14)
AST SERPL-CCNC: 29 U/L — SIGNIFICANT CHANGE UP (ref 0–41)
BASOPHILS # BLD AUTO: 0.03 K/UL — SIGNIFICANT CHANGE UP (ref 0–0.2)
BASOPHILS NFR BLD AUTO: 0.3 % — SIGNIFICANT CHANGE UP (ref 0–1)
BILIRUB SERPL-MCNC: 0.8 MG/DL — SIGNIFICANT CHANGE UP (ref 0.2–1.2)
BUN SERPL-MCNC: 18 MG/DL — SIGNIFICANT CHANGE UP (ref 10–20)
CALCIUM SERPL-MCNC: 8 MG/DL — LOW (ref 8.5–10.1)
CHLORIDE SERPL-SCNC: 106 MMOL/L — SIGNIFICANT CHANGE UP (ref 98–110)
CO2 SERPL-SCNC: 23 MMOL/L — SIGNIFICANT CHANGE UP (ref 17–32)
CREAT SERPL-MCNC: 0.7 MG/DL — SIGNIFICANT CHANGE UP (ref 0.7–1.5)
EOSINOPHIL # BLD AUTO: 0.25 K/UL — SIGNIFICANT CHANGE UP (ref 0–0.7)
EOSINOPHIL NFR BLD AUTO: 2.5 % — SIGNIFICANT CHANGE UP (ref 0–8)
GLUCOSE SERPL-MCNC: 102 MG/DL — HIGH (ref 70–99)
HCT VFR BLD CALC: 35.7 % — LOW (ref 42–52)
HGB BLD-MCNC: 11.7 G/DL — LOW (ref 14–18)
IMM GRANULOCYTES NFR BLD AUTO: 1.1 % — HIGH (ref 0.1–0.3)
LYMPHOCYTES # BLD AUTO: 1.37 K/UL — SIGNIFICANT CHANGE UP (ref 1.2–3.4)
LYMPHOCYTES # BLD AUTO: 13.5 % — LOW (ref 20.5–51.1)
MAGNESIUM SERPL-MCNC: 2 MG/DL — SIGNIFICANT CHANGE UP (ref 1.8–2.4)
MCHC RBC-ENTMCNC: 29.1 PG — SIGNIFICANT CHANGE UP (ref 27–31)
MCHC RBC-ENTMCNC: 32.8 G/DL — SIGNIFICANT CHANGE UP (ref 32–37)
MCV RBC AUTO: 88.8 FL — SIGNIFICANT CHANGE UP (ref 80–94)
MONOCYTES # BLD AUTO: 0.84 K/UL — HIGH (ref 0.1–0.6)
MONOCYTES NFR BLD AUTO: 8.3 % — SIGNIFICANT CHANGE UP (ref 1.7–9.3)
NEUTROPHILS # BLD AUTO: 7.55 K/UL — HIGH (ref 1.4–6.5)
NEUTROPHILS NFR BLD AUTO: 74.3 % — SIGNIFICANT CHANGE UP (ref 42.2–75.2)
NRBC # BLD: 0 /100 WBCS — SIGNIFICANT CHANGE UP (ref 0–0)
PLATELET # BLD AUTO: 229 K/UL — SIGNIFICANT CHANGE UP (ref 130–400)
POTASSIUM SERPL-MCNC: 3.4 MMOL/L — LOW (ref 3.5–5)
POTASSIUM SERPL-SCNC: 3.4 MMOL/L — LOW (ref 3.5–5)
PROT SERPL-MCNC: 5.1 G/DL — LOW (ref 6–8)
RBC # BLD: 4.02 M/UL — LOW (ref 4.7–6.1)
RBC # FLD: 13.7 % — SIGNIFICANT CHANGE UP (ref 11.5–14.5)
SODIUM SERPL-SCNC: 140 MMOL/L — SIGNIFICANT CHANGE UP (ref 135–146)
WBC # BLD: 10.15 K/UL — SIGNIFICANT CHANGE UP (ref 4.8–10.8)
WBC # FLD AUTO: 10.15 K/UL — SIGNIFICANT CHANGE UP (ref 4.8–10.8)

## 2019-06-13 PROCEDURE — 99233 SBSQ HOSP IP/OBS HIGH 50: CPT

## 2019-06-13 PROCEDURE — 99232 SBSQ HOSP IP/OBS MODERATE 35: CPT

## 2019-06-13 PROCEDURE — 70450 CT HEAD/BRAIN W/O DYE: CPT | Mod: 26

## 2019-06-13 RX ORDER — MANNITOL
30 POWDER (GRAM) MISCELLANEOUS ONCE
Refills: 0 | Status: DISCONTINUED | OUTPATIENT
Start: 2019-06-13 | End: 2019-06-13

## 2019-06-13 RX ORDER — ACETAMINOPHEN 500 MG
650 TABLET ORAL EVERY 6 HOURS
Refills: 0 | Status: DISCONTINUED | OUTPATIENT
Start: 2019-06-13 | End: 2019-06-14

## 2019-06-13 RX ORDER — DILTIAZEM HCL 120 MG
30 CAPSULE, EXT RELEASE 24 HR ORAL EVERY 6 HOURS
Refills: 0 | Status: DISCONTINUED | OUTPATIENT
Start: 2019-06-13 | End: 2019-06-14

## 2019-06-13 RX ORDER — ACETAMINOPHEN 500 MG
650 TABLET ORAL EVERY 6 HOURS
Refills: 0 | Status: DISCONTINUED | OUTPATIENT
Start: 2019-06-13 | End: 2019-06-13

## 2019-06-13 RX ORDER — MANNITOL
30 POWDER (GRAM) MISCELLANEOUS ONCE
Refills: 0 | Status: COMPLETED | OUTPATIENT
Start: 2019-06-13 | End: 2019-06-13

## 2019-06-13 RX ORDER — KETOROLAC TROMETHAMINE 30 MG/ML
15 SYRINGE (ML) INJECTION ONCE
Refills: 0 | Status: DISCONTINUED | OUTPATIENT
Start: 2019-06-13 | End: 2019-06-13

## 2019-06-13 RX ADMIN — Medication 30 MILLIGRAM(S): at 17:09

## 2019-06-13 RX ADMIN — Medication 650 MILLIGRAM(S): at 00:14

## 2019-06-13 RX ADMIN — LEVETIRACETAM 420 MILLIGRAM(S): 250 TABLET, FILM COATED ORAL at 17:13

## 2019-06-13 RX ADMIN — FLUOROMETHOLONE 1 DROP(S): 1 SOLUTION/ DROPS OPHTHALMIC at 11:29

## 2019-06-13 RX ADMIN — ATORVASTATIN CALCIUM 20 MILLIGRAM(S): 80 TABLET, FILM COATED ORAL at 21:20

## 2019-06-13 RX ADMIN — Medication 1 DROP(S): at 11:29

## 2019-06-13 RX ADMIN — CHLORHEXIDINE GLUCONATE 1 APPLICATION(S): 213 SOLUTION TOPICAL at 06:00

## 2019-06-13 RX ADMIN — Medication 30 MILLIGRAM(S): at 11:28

## 2019-06-13 RX ADMIN — TAMSULOSIN HYDROCHLORIDE 0.4 MILLIGRAM(S): 0.4 CAPSULE ORAL at 21:20

## 2019-06-13 RX ADMIN — Medication 650 MILLIGRAM(S): at 06:00

## 2019-06-13 RX ADMIN — MEMANTINE HYDROCHLORIDE 10 MILLIGRAM(S): 10 TABLET ORAL at 11:29

## 2019-06-13 RX ADMIN — Medication 300 GRAM(S): at 21:21

## 2019-06-13 RX ADMIN — Medication 650 MILLIGRAM(S): at 17:36

## 2019-06-13 RX ADMIN — LEVETIRACETAM 420 MILLIGRAM(S): 250 TABLET, FILM COATED ORAL at 06:03

## 2019-06-13 RX ADMIN — Medication 100 MILLIGRAM(S): at 06:03

## 2019-06-13 NOTE — SWALLOW BEDSIDE ASSESSMENT ADULT - SWALLOW EVAL: CURRENT DIET
NPO w/ alternate means of nutrition/hydration
regular w/ thins
NPO w/ alternate means of nutrition/hydration

## 2019-06-13 NOTE — PROGRESS NOTE ADULT - ASSESSMENT
# s/p unwitnessed fall (?syncope) with resultant SDH, contusions, SAH and small IVH and large left parietal/occipital hematoma.  - mental status worse than yesterday, repeat CTH today with  no significant changes  - will give Mannitol x1, 30 mg IV, monitor BP, monitor electrolytes  - NSx for f/u  - Neurology eval apprecieted  - cont Keppra for seizure ppx  - fall precautions  - had syncopal workup including ECHO, carotid doppler, unit monitoring (was on tele monitor)  - pt is NPO, SLP re-evaluated today, recommended if pt is awake give PO diet - pureed with nectar thick    # Urinary retention - pt tries to pull the Lee out, will do TOV  # COLTEN - pt with altered mental status, would not start CPAP at this point    # Hearing impaired - use aids    # HTN - BP fluctuates, cont Cardizem short acting, monitor BP, keep -140    # Dyslipidemia on statin    # Continue current management for dementia and BPH    DVT prophylaxis - on heparin SQ    Prognosis is guarded, discussed with daughter and son, they wanted to see the progression and make a decision regarding hospice.    #Progress Note Handoff  Pending clinical stabilization  Consults: NSx f/u  Tests: none  Test Results: electrolytes  Family Discussion: ongoing  Future Disposition: not known at this time # s/p unwitnessed fall (?syncope) with resultant SDH, contusions, SAH and small IVH and large left parietal/occipital hematoma.  - mental status worse than yesterday, repeat CTH today with  no significant changes  - will give Mannitol x1, 30 mg IV, monitor BP, monitor electrolytes  - NSx for f/u  - Neurology eval apprecieted  - cont Keppra for seizure ppx  - fall precautions  - had syncopal workup including ECHO, carotid doppler, unit monitoring (was on tele monitor)  - pt is NPO, SLP re-evaluated today, recommended if pt is awake give PO diet - pureed with nectar thick    # Urinary retention - cont Lee  # COLTEN - pt with altered mental status, would not start CPAP at this point    # Hearing impaired - use aids    # HTN - BP fluctuates, cont Cardizem short acting, monitor BP, keep -140    # Dyslipidemia on statin    # Continue current management for dementia and BPH    DVT prophylaxis - on heparin SQ    Prognosis is guarded, discussed with daughter and son, they wanted to see the progression and make a decision regarding hospice.    #Progress Note Handoff  Pending clinical stabilization  Consults: NSx f/u  Tests: none  Test Results: electrolytes  Family Discussion: ongoing  Future Disposition: not known at this time

## 2019-06-13 NOTE — SWALLOW BEDSIDE ASSESSMENT ADULT - SWALLOW EVAL: RECOMMENDED DIET
NPO w/ alternate means of nutrition/hydration
NPO w/ alt means of nutrition/hydration
NPO w/ alternate means of nutrition/hydration

## 2019-06-13 NOTE — PROGRESS NOTE ADULT - ASSESSMENT
84yMale being evaluated for follow up    Patient's is noted to be more restless and confused today. Palliative team discussed with daughter Angeline HCP yesterday, and she wanted to continue with ongoing treatment  till next week Tuesday. Daughter will assume comfort measures/ hospice care in a facility at that time if no improvement in status.  Other siblings at bedside son and daughter) said if repeat ct head shows worsening edema, they will speak with Angeline (HCP) and might assume comfort measures sooner than next Tuesday. Palliative team will continue to provide supportive care           Recommendations:  Haldol concentrate 1mg q6hrs PRN Agitation  Ongoing medical mgt  DNI/DNR  Awaiting repeat CTH  we will f/u

## 2019-06-13 NOTE — SWALLOW BEDSIDE ASSESSMENT ADULT - SLP GENERAL OBSERVATIONS
Pt received laying in bed, asleep, repositioned upright however unable to be awoken/maintain arousal. +O2 via nasal cannula. Pt's sister in law and son at the bedside. Pt able to maintain arousal for a very short period of time, which he was presented with 2 teaspoons of applesauce, however pt fell back asleep right after. Pt is deemed not safe/appropriate for PO intake at this time.
pt received OOB to chair asleep arousable however unable to maintain arousal for more than 2-3 seconds at a time. +o2 via nc. as per PCA, pt ate most of lunch tray however +pocketing food. +sister-in-law Ronit at b/s.
Pt received laying in bed, repositioned upright however unable to be awoken/maintain arousal. +O2 via nasal cannula. Pt's sister in law at the bedside, who reported he was "wide awake" and was eating fine yesterday.

## 2019-06-13 NOTE — PROGRESS NOTE ADULT - ASSESSMENT
83 YO M DNR;/DNI, w/PMHx of Dementia, unsteady gait/falls, HTN, DLD, BPH, Glaucoma, was  BIBEMS, after unwitnessed fall (+HT, -LOC, -AC) and was found to have subdural/subarachnoid hemorrhage, parietal/occipital extracalvarial hematoma and frontal/temporal/cerebellar hemorrhages. He was downgraded from SICU and transferred to Medicine for medical optimization after hospital course was complicated by worsening leukocytosis, mental status and ALPHONSO on 06/10/19    #S/P unwitnessed fall/SDH/SAH/Frontal and Temporal hemorrhagic Contusion/Diastatic Nondisplaced Fracture of Cerebellun: not improving  - CT head worse w/midline shift, seen by NSx - no interventions planned  - pt is a high risk for sudden worsening of condition including death, high risk for seizure   - Neurosx: no interventions  - Neuro on board  - VEEG: focal/generalized slowing  - CT head 06/10/19: Worsening hemorrhage w/midline shift of 5 mm  - Syncope w/u: negative  - Physiatry: STR in STR  - PT eval pending improvement  - Speech and swallow reeval    #ALPHONSO resolved  - Cr 2.0->1.1-0.7  - D/c de la fuente, TOV  - US Kidneys/Bladder: unremarkable  - Cont IV hydration  - Strict Is&Os, Weight QD  - Avoid nephrotoxic meds  - Nephro following    #Leukocytosis unknown etiology: down trending  - Sepsis w/u negative so far  - CXR no acute pathology per official read    #BPH  - Cont Tamsulosin 0.4 qhs  - D/c de la fuente, TOV    #HTN: Low BP  - Cont Amlodipine/Atenolol  - Monitor BP closely    #DLD  - Cont Lipitor    #Dementia:   - Cont current meds    #DVT ppx: SCD  #Activity: ambulate w/assistance  #Diet: NPO for now  #Dispo: Acute  #DNR/DNI 83 YO M DNR;/DNI, w/PMHx of Dementia, unsteady gait/falls, HTN, DLD, BPH, Glaucoma, was  BIBEMS, after unwitnessed fall (+HT, -LOC, -AC) and was found to have subdural/subarachnoid hemorrhage, parietal/occipital extracalvarial hematoma and frontal/temporal/cerebellar hemorrhages. He was downgraded from SICU and transferred to Medicine for medical optimization after hospital course was complicated by worsening leukocytosis, mental status and ALPHONSO on 06/10/19    #S/P unwitnessed fall/SDH/SAH/Frontal and Temporal hemorrhagic Contusion/Diastatic Nondisplaced Fracture of Cerebellun: not improving  - CT head read pending  - Pt is a high risk for sudden worsening of condition including death, high risk for seizure   - Neuro apprec. Rpt CTH  - Syncope w/u: negative  - Physiatry: STR in STR  - Speech and swallow reeval pending    #ALPHONSO resolved  - Cr 0.7  - D/c de la fuente, TOV  - US Kidneys/Bladder: unremarkable  - Cont IV hydration  - Strict Is&Os, Weight QD  - Avoid nephrotoxic meds    #Leukocytosis resolved  - Sepsis w/u negative so far  - Repeat CXR     #BPH  - Cont Tamsulosin 0.4 qhs  - D/c de la fuente, TOV    #HTN: Low BP  - Cont Amlodipine/Atenolol  - Monitor BP closely    #DLD  - Cont Lipitor    #Dementia:   - Cont current meds    #DVT ppx: SCD  #Activity: ambulate w/assistance  #Diet: NPO for now  #Dispo: Acute  #DNR/DNI 85 YO M DNR;/DNI, w/PMHx of Dementia, unsteady gait/falls, HTN, DLD, BPH, Glaucoma, was  BIBEMS, after unwitnessed fall (+HT, -LOC, -AC) and was found to have subdural/subarachnoid hemorrhage, parietal/occipital extracalvarial hematoma and frontal/temporal/cerebellar hemorrhages. He was downgraded from SICU and transferred to Medicine for medical optimization after hospital course was complicated by worsening leukocytosis, mental status and ALPHONSO on 06/10/19    #S/P unwitnessed fall/SDH/SAH/Frontal and Temporal hemorrhagic Contusion/Diastatic Nondisplaced Fracture of Cerebellun: not improving: worse  - CT head stable  - Neurosurgery recalled for worsening mental status  - Pt is a high risk for sudden worsening of condition including death, high risk for seizure   - Neuro apprec. Rpt CTH  - Syncope w/u: negative  - Physiatry: STR in STR  - Speech and swallow apprec, recs noted    #ALPHONSO resolved  - Cr 0.7  - D/c de la fuente, TOV  - US Kidneys/Bladder: unremarkable  - Cont IV hydration  - Strict Is&Os, Weight QD  - Avoid nephrotoxic meds    #Leukocytosis resolved  - Sepsis w/u negative so far  - Repeat CXR     #BPH  - Cont Tamsulosin 0.4 qhs  - D/c de la fuente, TOV    #HTN: mildly elevated  - Cont Cardizem  - Monitor BP closely    #DLD  - Cont Lipitor    #Dementia:   - Cont current meds    #DVT ppx: SCD  #Activity: ambulate w/assistance  #Diet: NPO for now  #Dispo: Acute  #DNR/DNI

## 2019-06-13 NOTE — PROGRESS NOTE ADULT - ASSESSMENT
Pt seen, examined. Lethargic, opens eyes briefly on noxious stimuli. Neck - supple. NAJMA. Left arm drops slightly faster than right when both elevated.  DTR symmetric. Gait - deferred.    A/P. S/p unwitnessed fall. Traumatic right fr-temp SDH, right fr-par contusion, SAH.  Worsening of Mental Status, gradual  Last HCT on 6/10/19 - more mass effect  videoEEG - no nonconvulsive seizures  - suggest repeating Head CT w/o contrast to exclude more edema/hemorrhagic transformation  - still suggest Mannitol if BP is not low  - consider CXR r/o PNA  - cont. Keppra at current dose 500 mg BID (IV, not PO -  since ot's MS is worse (unless has NGT)  - depending of repeat HCT results - consider re-consult NS (for now - no intervention

## 2019-06-13 NOTE — PROGRESS NOTE ADULT - SUBJECTIVE AND OBJECTIVE BOX
84yMale with diagnosis: SUBDURAL HEMATOMA DUE TO CONCUSSION WITH LOC, SEQUELA;FALL      Patient seen, awake, confused and restless. Patient is not follow command.       PHYSICAL EXAM    General: Age appropriate male, awake and confused  Eye: PERRL  Lungs: Coarse b/l  Cardiac: S1S2 RRR  Abd: soft, non tender/distended  Ext: moves all ext.       T(C): , Max: 36.2 (23:00)  T(F): 97  HR: 70 (63 - 71)  BP: 138/68 (130/70 - 155/67)  RR: 20 (20 - 20)  SpO2: --                                    11.7   10.15 )-----------( 229      ( 13 Jun 2019 07:44 )             35.7                                                                                      06-13    140  |  106  |  18  ----------------------------<  102<H>  3.4<L>   |  23  |  0.7    Ca    8.0<L>      13 Jun 2019 07:44  Phos  3.0     06-12  Mg     2.0     06-13    TPro  5.1<L>  /  Alb  3.0<L>  /  TBili  0.8  /  DBili  x   /  AST  29  /  ALT  50<H>  /  AlkPhos  24<L>  06-13                                                      MEDICATIONS  (STANDING):  atorvastatin 20 milliGRAM(s) Oral at bedtime  chlorhexidine 4% Liquid 1 Application(s) Topical <User Schedule>  dextrose 5% + sodium chloride 0.9%. 1000 milliLiter(s) (75 mL/Hr) IV Continuous <Continuous>  diltiazem    Tablet 30 milliGRAM(s) Oral every 6 hours  fluorometholone 0.1% Suspension 1 Drop(s) Both EYES daily  levETIRAcetam  IVPB 500 milliGRAM(s) IV Intermittent every 12 hours  memantine 10 milliGRAM(s) Oral daily  pantoprazole   Suspension 40 milliGRAM(s) Oral daily  tamsulosin 0.4 milliGRAM(s) Oral at bedtime  timolol 0.5% Solution 1 Drop(s) Both EYES daily    MEDICATIONS  (PRN):  acetaminophen   Tablet .. 650 milliGRAM(s) Oral every 6 hours PRN Mild Pain (1 - 3)

## 2019-06-13 NOTE — SWALLOW BEDSIDE ASSESSMENT ADULT - SWALLOW EVAL: FUNCTIONAL LEVEL AT TIME OF EVAL
Pt w/ minimal arousal despite max multimodal cues.
Pt sleeping and w/ minimal arousal despite max multimodal cues.

## 2019-06-13 NOTE — SWALLOW BEDSIDE ASSESSMENT ADULT - SLP PERTINENT HISTORY OF CURRENT PROBLEM
pt admitted from home for unwitnessed fall (+HT, ?LOC, -AC); CTH -> SDH right frontotemporal, scattered SAH, small layering interventricular hemorrhage, Hemorrhagic contusion of Right frontal, temporal lobes, left cerebellum, nondisplaced diastatic fracture. Worsening of mass effect compared to HCT results on 6/5/19 w/ fluctuating mental status. neuro recs for epilepsy consult/24hr vEEG to exclude ongoing complex partial seizures; no neurosx intervention at this time.
pt admitted from home for unwitnessed fall (+HT, ?LOC, -AC); CTH -> SDH right frontotemporal, scattered SAH, small layering interventricular hemorrhage, Hemorrhagic contusion of Right frontal, temporal lobes, left cerebellum, nondisplaced diastatic fracture. Worsening of mass effect compared to HCT results on 6/5/19 w/ fluctuating mental status; no neurosx intervention at this time.
pt admitted from home for unwitnessed fall (+HT, ?LOC, -AC); CTH -> SDH right frontotemporal, scattered SAH, small layering interventricular hemorrhage, Hemorrhagic contusion of Right frontal, temporal lobes, left cerebellum, nondisplaced diastatic fracture. Worsening of mass effect compared to HCT results on 6/5/19 w/ fluctuating mental status; no neurosx intervention at this time.

## 2019-06-13 NOTE — PROGRESS NOTE ADULT - SUBJECTIVE AND OBJECTIVE BOX
Neurology consult    ASCENCION LANGLEY84yMale    HPI:  84y m  TRAUMA ACTIVATION LEVEL:  Trauma alert    MECHANISM OF INJURY: s/p unwitnessed fall (+HT, ?LOC, -AC)     [] Blunt               [] MVC	                       [X] Fall	                 [] Pedestrian Struck	     [] Motorcycle    [] Assault                       [] Bicycle collision  [] Sports injury     [] Penetrating    [] Gun Shot Wound    [] Stab Wound    GCS: 	15  E: 4	V: 5	M: 6    HPI:   84y old male, BIBEMS, s/p unwitnessed fall (+HT, ?LOC, -AC); patient states he fell in his house, in his kitchen. No LOC. Noted by daughter to have bruising to head. Daughter called her father this morning states that he was acting strange. He said he was tired and went back to sleep. This was at 10am and very unusual for the pt as per daughter because pt rises early in morning and does not oversleep. Daughter then drove from [place] to her fathers home and noted pt has large contusion to his scalp. Daughter then brought pt to the ED for evaluation No nausea, no vomiting. No seizure activity. No blood thinners. No recent illness, no fever, no chest pain or shortness of breath. No abdominal pain.    PAST MEDICAL & SURGICAL HISTORY:  HTN, HLD, BPH, Glaucoma    Home Meds: Home Medications:  AMLODIPINE   TAB 2.5MG:  (04 Jun 2019 23:38)  ATENOLOL     TAB 25MG:  (04 Jun 2019 23:38)  FLUOROMETHOL ELLA 0.1% OP:  (04 Jun 2019 23:38)  MEMANTINE    TAB HCL 10MG:  (04 Jun 2019 23:38)  ROSUVASTATIN TAB 5MG:  (04 Jun 2019 23:38)  TAMSULOSIN   CAP 0.4MG:  (04 Jun 2019 23:38)  TIMOLOL MAL  SOL 0.5% OP:  (04 Jun 2019 23:38)    Allergies: AllergiesNo Known Allergies    Advanced Directives: Presumed Full Code (05 Jun 2019 00:06)          MEDICATIONS    acetaminophen   Tablet .. 650 milliGRAM(s) Oral every 6 hours PRN  atorvastatin 20 milliGRAM(s) Oral at bedtime  chlorhexidine 4% Liquid 1 Application(s) Topical <User Schedule>  dextrose 5% + sodium chloride 0.9%. 1000 milliLiter(s) IV Continuous <Continuous>  diltiazem    Tablet 30 milliGRAM(s) Oral every 6 hours  fluorometholone 0.1% Suspension 1 Drop(s) Both EYES daily  levETIRAcetam  IVPB 500 milliGRAM(s) IV Intermittent every 12 hours  memantine 10 milliGRAM(s) Oral daily  pantoprazole   Suspension 40 milliGRAM(s) Oral daily  tamsulosin 0.4 milliGRAM(s) Oral at bedtime  timolol 0.5% Solution 1 Drop(s) Both EYES daily      PAST MEDICAL & SURGICAL HISTORY:       Family history: No history of dementia, strokes, or seizures   FAMILY HISTORY:    SOCIAL HISTORY --     Allergies    No Known Allergies    Intolerances            Vital Signs Last 24 Hrs  T(C): 36.1 (13 Jun 2019 07:30), Max: 36.2 (12 Jun 2019 23:00)  T(F): 97 (13 Jun 2019 07:30), Max: 97.1 (12 Jun 2019 23:00)  HR: 70 (13 Jun 2019 07:30) (63 - 71)  BP: 138/68 (13 Jun 2019 07:30) (129/65 - 155/67)  BP(mean): --  RR: 20 (13 Jun 2019 07:30) (20 - 20)  SpO2: --    SUBDURAL HEMATOMA DUE TO CONCUSSION WITH LOC, SEQUELA;FALL  ^POSS STROKE LAST NIGHT  Yes  Handoff  MEWS Score  Subdural hematoma due to concussion, with loss of consciousness, sequela  Subdural hematoma due to concussion, with loss of consciousness, sequela  POSS STROKE LAST NIGHT  Subarachnoid hemorrhage, traumatic  Fall       LABS:  CBC Full  -  ( 13 Jun 2019 07:44 )  WBC Count : 10.15 K/uL  RBC Count : 4.02 M/uL  Hemoglobin : 11.7 g/dL  Hematocrit : 35.7 %  Platelet Count - Automated : 229 K/uL        06-13    140  |  106  |  18  ----------------------------<  102<H>  3.4<L>   |  23  |  0.7

## 2019-06-13 NOTE — SWALLOW BEDSIDE ASSESSMENT ADULT - SWALLOW EVAL: DIAGNOSIS
PO trials not appropriate at this time 2' pt unable to sustain arousal > 2-3 seconds.
PO trials not appropriate on this date d/t pt unable to maintain adequate arousal
Pt w/ very little acceptance of PO intake and unable to maintain adequate arousal for PO trials on this date.

## 2019-06-13 NOTE — PROGRESS NOTE ADULT - SUBJECTIVE AND OBJECTIVE BOX
DAILY PROGRESS NOTE  ===========================================================    Patient Information:  ASCENCION LANGLEY  /  84y  /  Male  /  MRN#: [id number]    Hospital Day: 9d     |:::::::::::::::::::::::::::| SUBJECTIVE |:::::::::::::::::::::::::::|    OVERNIGHT EVENTS: No overnight events    TODAY: Patient was seen today at bedside. ROS unable to do    |:::::::::::::::::::::::::::| OBJECTIVE |:::::::::::::::::::::::::::|    VITAL SIGNS: Last 24 Hours  T(C): 36.2 (12 Jun 2019 23:00), Max: 36.2 (12 Jun 2019 23:00)  T(F): 97.1 (12 Jun 2019 23:00), Max: 97.1 (12 Jun 2019 23:00)  HR: 71 (12 Jun 2019 23:00) (61 - 71)  BP: 155/67 (12 Jun 2019 23:00) (90/54 - 155/67)  BP(mean): --  ABP: --  ABP(mean): --  RR: 20 (12 Jun 2019 23:00) (20 - 20)  SpO2: 93% (12 Jun 2019 10:00) (93% - 93%)      PHYSICAL EXAM:  GENERAL: Patient was lying in bed, on O2 by NC, sat 93%, awake, confused, disoriented, A&Ox0  HEENT: Head NC/Traumatic/Fractures ; Conjunctivae clear, Sclera anicteric; Pupils were normal size and reactive to light. Oral mucosa moist.  CARDIO: Regular rate; Regular rhythm; S1 & S2.  RESP: Coarse b/l  GI: Distended, firm; +BS; No guarding; No rebound tenderness.  EXT: No edema in UE and LE.  NEURO: Moving extremities voluntarily but not following command, responding inappropriately   SKIN: Ecchymosis in UEs    Lee catheter: draining clear yellow urine w/o blood    LAB RESULTS:                            12.2   11.26 )-----------( 213      ( 12 Jun 2019 06:23 )             36.9     06-12    141  |  107  |  19  ----------------------------<  119<H>  3.6   |  21  |  0.7    Ca    8.1<L>      12 Jun 2019 06:23  Phos  3.0     06-12  Mg     1.9     06-12    TPro  5.3<L>  /  Alb  3.0<L>  /  TBili  0.8  /  DBili  x   /  AST  35  /  ALT  57<H>  /  AlkPhos  24<L>  06-12    MICROBIOLOGY:        RADIOLOGY:    CT Head: 06/04/19    1.  Large left parietal/occipital extracalvarial hematoma. Slight   widening of the left lambdoid suture suspicious for nondisplaced   diastatic fracture.    2.  Subdural hemorrhage in the right frontotemporal convexity measuring   up to 8 mm in width and within the left cerebellopontine angle cistern   measuring 8 mm in width.    3.  Hemorrhagic contusions in the right frontal and temporal lobes and   left cerebellum.      4.  Scattered subarachnoid hemorrhage and small layering intraventricular   hemorrhage.    CT Head: 06/05/19    1.  Subdural hemorrhage over the right frontal and temporal convexities   and left cerebellopontine angle cistern, not significantly changed since   the prior study.    2.  Stable right frontal, right temporal and left cerebellar contusions.     3.  No midline shift. Stable ventricular size.    4.  Scattered subarachnoid hemorrhage and small intraventricular   hemorrhage are again noted.     5.  Redemonstration of large left parietal/occipital extracalvarial   hematoma and soft tissue swelling with slight widening of the left   lambdoid suture.    6.  Visualized paranasal sinuses and bilateral mastoid air cells are   well-aerated.    CT Head 06/10/19    1.  Interval enlargement of a right frontoparietal hemorrhagic contusion   with increased mass effect upon the anterior horn of the right lateral   ventricle. There is new right to left midline shift of approximately 5 mm.    2.  Slight interval increase in size of a small amount of layering blood  within the lateral ventricles.    3.  Stable subdural hematomas over the right frontal temporal convexity   and left cerebellopontine angle cistern.    4.  Overall stable appearance of subarachnoid hemorrhage within the right   frontal and temporallobe sulci.    5.  No definite evidence of new parenchymal infarction.    6.  Stable left occipital extra-axial scalp hematoma. Decreased soft   tissue edema within the left parietal scalp. Stable mild diastasis of the   left lambdoid suture.        ALLERGIES:  No Known Allergies    HOME MEDICATIONS:  AMLODIPINE   TAB 2.5MG:  (04 Jun 2019 23:38)  ATENOLOL     TAB 25MG:  (04 Jun 2019 23:38)  FLUOROMETHOL ELLA 0.1% OP:  (04 Jun 2019 23:38)  MEMANTINE    TAB HCL 10MG:  (04 Jun 2019 23:38)  ROSUVASTATIN TAB 5MG:  (04 Jun 2019 23:38)  TAMSULOSIN   CAP 0.4MG:  (04 Jun 2019 23:38)  TIMOLOL MAL  SOL 0.5% OP:  (04 Jun 2019 23:38)    =========================================================== DAILY PROGRESS NOTE  ===========================================================    Patient Information:  ASCENCION LANGLEY  /  84y  /  Male  /  MRN#: [id number]    Hospital Day: 9d     |:::::::::::::::::::::::::::| SUBJECTIVE |:::::::::::::::::::::::::::|    OVERNIGHT EVENTS: No overnight events    TODAY: Patient was seen today at bedside. He is more lethargic    |:::::::::::::::::::::::::::| OBJECTIVE |:::::::::::::::::::::::::::|    VITAL SIGNS: Last 24 Hours  T(C): 36.2 (12 Jun 2019 23:00), Max: 36.2 (12 Jun 2019 23:00)  T(F): 97.1 (12 Jun 2019 23:00), Max: 97.1 (12 Jun 2019 23:00)  HR: 71 (12 Jun 2019 23:00) (61 - 71)  BP: 155/67 (12 Jun 2019 23:00) (90/54 - 155/67)  BP(mean): --  ABP: --  ABP(mean): --  RR: 20 (12 Jun 2019 23:00) (20 - 20)  SpO2: 93% (12 Jun 2019 10:00) (93% - 93%)      PHYSICAL EXAM:  GENERAL: Patient was lying in bed, on O2 by NC, sat 93%, awake, confused, disoriented, A&Ox0  HEENT: Head NC/Traumatic/Fractures ; Conjunctivae clear, Sclera anicteric; Pupils were normal size and reactive to light. Oral mucosa moist.  CARDIO: Regular rate; Regular rhythm; S1 & S2.  RESP: Coarse b/l  GI: Distended, firm; +BS; No guarding; No rebound tenderness.  EXT: No edema in UE and LE.  NEURO: Moving extremities voluntarily but not following command, responding inappropriately   SKIN: Ecchymosis in UEs    Lee catheter: draining clear yellow urine w/o blood    LAB RESULTS:                            12.2   11.26 )-----------( 213      ( 12 Jun 2019 06:23 )             36.9     06-12    141  |  107  |  19  ----------------------------<  119<H>  3.6   |  21  |  0.7    Ca    8.1<L>      12 Jun 2019 06:23  Phos  3.0     06-12  Mg     1.9     06-12    TPro  5.3<L>  /  Alb  3.0<L>  /  TBili  0.8  /  DBili  x   /  AST  35  /  ALT  57<H>  /  AlkPhos  24<L>  06-12    MICROBIOLOGY:        RADIOLOGY:    CT Head: 06/04/19    1.  Large left parietal/occipital extracalvarial hematoma. Slight   widening of the left lambdoid suture suspicious for nondisplaced   diastatic fracture.    2.  Subdural hemorrhage in the right frontotemporal convexity measuring   up to 8 mm in width and within the left cerebellopontine angle cistern   measuring 8 mm in width.    3.  Hemorrhagic contusions in the right frontal and temporal lobes and   left cerebellum.      4.  Scattered subarachnoid hemorrhage and small layering intraventricular   hemorrhage.    CT Head: 06/05/19    1.  Subdural hemorrhage over the right frontal and temporal convexities   and left cerebellopontine angle cistern, not significantly changed since   the prior study.    2.  Stable right frontal, right temporal and left cerebellar contusions.     3.  No midline shift. Stable ventricular size.    4.  Scattered subarachnoid hemorrhage and small intraventricular   hemorrhage are again noted.     5.  Redemonstration of large left parietal/occipital extracalvarial   hematoma and soft tissue swelling with slight widening of the left   lambdoid suture.    6.  Visualized paranasal sinuses and bilateral mastoid air cells are   well-aerated.    CT Head 06/10/19    1.  Interval enlargement of a right frontoparietal hemorrhagic contusion   with increased mass effect upon the anterior horn of the right lateral   ventricle. There is new right to left midline shift of approximately 5 mm.    2.  Slight interval increase in size of a small amount of layering blood  within the lateral ventricles.    3.  Stable subdural hematomas over the right frontal temporal convexity   and left cerebellopontine angle cistern.    4.  Overall stable appearance of subarachnoid hemorrhage within the right   frontal and temporallobe sulci.    5.  No definite evidence of new parenchymal infarction.    6.  Stable left occipital extra-axial scalp hematoma. Decreased soft   tissue edema within the left parietal scalp. Stable mild diastasis of the   left lambdoid suture.        ALLERGIES:  No Known Allergies    HOME MEDICATIONS:  AMLODIPINE   TAB 2.5MG:  (04 Jun 2019 23:38)  ATENOLOL     TAB 25MG:  (04 Jun 2019 23:38)  FLUOROMETHOL ELLA 0.1% OP:  (04 Jun 2019 23:38)  MEMANTINE    TAB HCL 10MG:  (04 Jun 2019 23:38)  ROSUVASTATIN TAB 5MG:  (04 Jun 2019 23:38)  TAMSULOSIN   CAP 0.4MG:  (04 Jun 2019 23:38)  TIMOLOL MAL  SOL 0.5% OP:  (04 Jun 2019 23:38)    =========================================================== DAILY PROGRESS NOTE  ===========================================================    Patient Information:  ASCENCION LANGLEY  /  84y  /  Male  /  MRN#: [id number]    Hospital Day: 9d     |:::::::::::::::::::::::::::| SUBJECTIVE |:::::::::::::::::::::::::::|    OVERNIGHT EVENTS: No overnight events    TODAY: Patient was seen today at bedside. He is more lethargic and less responsive, agitated at times, almost nonverbal.    |:::::::::::::::::::::::::::| OBJECTIVE |:::::::::::::::::::::::::::|    VITAL SIGNS: Last 24 Hours  T(C): 36.2 (12 Jun 2019 23:00), Max: 36.2 (12 Jun 2019 23:00)  T(F): 97.1 (12 Jun 2019 23:00), Max: 97.1 (12 Jun 2019 23:00)  HR: 71 (12 Jun 2019 23:00) (61 - 71)  BP: 155/67 (12 Jun 2019 23:00) (90/54 - 155/67)  BP(mean): --  ABP: --  ABP(mean): --  RR: 20 (12 Jun 2019 23:00) (20 - 20)  SpO2: 93% (12 Jun 2019 10:00) (93% - 93%)      PHYSICAL EXAM:  GENERAL: Patient was lying in bed, on O2 by NC, sat 93%, awake, confused, disoriented, A&Ox0  HEENT: Head NC/Traumatic/Fractures ; Conjunctivae clear, Sclera anicteric; Pupils were normal size and reactive to light. Oral mucosa moist.  CARDIO: Regular rate; Regular rhythm; S1 & S2.  RESP: Coarse b/l  GI: Distended, firm; +BS; No guarding; No rebound tenderness.  EXT: No edema in UE and LE.  NEURO: Moving extremities voluntarily but not following command, responding inappropriately   SKIN: Ecchymosis in UEs    Lee catheter: draining clear yellow urine w/o blood    LAB RESULTS:                            12.2   11.26 )-----------( 213      ( 12 Jun 2019 06:23 )             36.9     06-12    141  |  107  |  19  ----------------------------<  119<H>  3.6   |  21  |  0.7    Ca    8.1<L>      12 Jun 2019 06:23  Phos  3.0     06-12  Mg     1.9     06-12    TPro  5.3<L>  /  Alb  3.0<L>  /  TBili  0.8  /  DBili  x   /  AST  35  /  ALT  57<H>  /  AlkPhos  24<L>  06-12    MICROBIOLOGY:        RADIOLOGY:    CT Head: 06/04/19    1.  Large left parietal/occipital extracalvarial hematoma. Slight   widening of the left lambdoid suture suspicious for nondisplaced   diastatic fracture.    2.  Subdural hemorrhage in the right frontotemporal convexity measuring   up to 8 mm in width and within the left cerebellopontine angle cistern   measuring 8 mm in width.    3.  Hemorrhagic contusions in the right frontal and temporal lobes and   left cerebellum.      4.  Scattered subarachnoid hemorrhage and small layering intraventricular   hemorrhage.    CT Head: 06/05/19    1.  Subdural hemorrhage over the right frontal and temporal convexities   and left cerebellopontine angle cistern, not significantly changed since   the prior study.    2.  Stable right frontal, right temporal and left cerebellar contusions.     3.  No midline shift. Stable ventricular size.    4.  Scattered subarachnoid hemorrhage and small intraventricular   hemorrhage are again noted.     5.  Redemonstration of large left parietal/occipital extracalvarial   hematoma and soft tissue swelling with slight widening of the left   lambdoid suture.    6.  Visualized paranasal sinuses and bilateral mastoid air cells are   well-aerated.    CT Head 06/10/19    1.  Interval enlargement of a right frontoparietal hemorrhagic contusion   with increased mass effect upon the anterior horn of the right lateral   ventricle. There is new right to left midline shift of approximately 5 mm.    2.  Slight interval increase in size of a small amount of layering blood  within the lateral ventricles.    3.  Stable subdural hematomas over the right frontal temporal convexity   and left cerebellopontine angle cistern.    4.  Overall stable appearance of subarachnoid hemorrhage within the right   frontal and temporallobe sulci.    5.  No definite evidence of new parenchymal infarction.    6.  Stable left occipital extra-axial scalp hematoma. Decreased soft   tissue edema within the left parietal scalp. Stable mild diastasis of the   left lambdoid suture.        ALLERGIES:  No Known Allergies    HOME MEDICATIONS:  AMLODIPINE   TAB 2.5MG:  (04 Jun 2019 23:38)  ATENOLOL     TAB 25MG:  (04 Jun 2019 23:38)  FLUOROMETHOL ELLA 0.1% OP:  (04 Jun 2019 23:38)  MEMANTINE    TAB HCL 10MG:  (04 Jun 2019 23:38)  ROSUVASTATIN TAB 5MG:  (04 Jun 2019 23:38)  TAMSULOSIN   CAP 0.4MG:  (04 Jun 2019 23:38)  TIMOLOL MAL  SOL 0.5% OP:  (04 Jun 2019 23:38)    ===========================================================

## 2019-06-13 NOTE — SWALLOW BEDSIDE ASSESSMENT ADULT - SWALLOW EVAL: ORAL MUSCULATURE
unable to assess due to poor participation/comprehension

## 2019-06-13 NOTE — PROGRESS NOTE ADULT - SUBJECTIVE AND OBJECTIVE BOX
ASCENCION LANGLEY    Patient is a 84y old  Male who presents with a chief complaint of Unwitnessed fall (13 Jun 2019 12:34)    HPI:  83 y/o man with PMH of mild dementia (lives alone at home and independent in ADL's and still driving), HTN, s/p cataract removal, COLTEN on CPAP, BPH, dyslipidemia, hearing impaired (uses hearing aids b/l), presented after fall and found to have SDH and SAH. Pt was admitted under trauma service to ICU. Pt downgraded to GMF on 6/10 and had worsening of mental status repeat CT showed worsening of hematoma and midline shift. NSx evaluated the pt, no interventions recommended.      INTERVAL HPI/OVERNIGHT EVENTS: pt was more calm and interactive yesterday, today pt is less responsive, looks restless.     ROS: unable to obtain    PHYSICAL EXAM:  T(C): 36.3, Max: 36.3 (06-13-19 @ 15:26)  HR: 72 (68 - 72)  BP: 154/90 (130/70 - 155/67)  RR: 20 (20 - 20)  SpO2: --    GENERAL: restless  PULMONARY/CHEST: No rales, rhonchi, wheezing  CARDIOVASC: Regular rate and rhythm; No murmurs  GI/ABDOMEN: Soft, Nontender, Nondistended; Bowel sounds present  : de la fuente catheter draining clear urine  EXTREMITIES:  2+ Peripheral Pulses, No clubbing, cyanosis, or edema, no deformity. No calf tenderness b/l.  NERVOUS SYSTEM: lethargic, PERRL, moves all 4 extremities, no sensation deficit appreciated    Consultant(s) Notes Reviewed by me.     LABS:                        11.7   10.15 )-----------( 229      ( 13 Jun 2019 07:44 )             35.7     06-13    140  |  106  |  18  ----------------------------<  102<H>  3.4<L>   |  23  |  0.7    Ca    8.0<L>      13 Jun 2019 07:44  Phos  3.0     06-12  Mg     2.0     06-13    TPro  5.1<L>  /  Alb  3.0<L>  /  TBili  0.8  /  DBili  x   /  AST  29  /  ALT  50<H>  /  AlkPhos  24<L>  06-13    Culture - Blood (collected 11 Jun 2019 06:17)  Source: .Blood None  Preliminary Report (12 Jun 2019 14:01):    No growth to date.    Culture - Blood (collected 10 Chato 2019 22:38)  Source: .Blood None  Preliminary Report (12 Jun 2019 07:01):    No growth to date.    [facility] 6/13: IMPRESSION:    1.  No significant interval change since the previous study 6/10/2019.    2.  Large right frontal lobe hemorrhagic contusion with overlying   subdural hematoma measuring up to 4 mm.    3.  Small left cerebellar and right anterior temporal hemorrhagic   contusions.    4.  Small scattered subarachnoid hemorrhage, small intraventricular   hemorrhage and mild ventriculomegaly.    5.  Small interhemispheric fissure and tentorial subdural hemorrhages.    6.  Left parietal extracalvarial hematoma and left lambdoid suture   diastatic fracture.      MEDICATIONS  (STANDING):  atorvastatin 20 milliGRAM(s) Oral at bedtime  chlorhexidine 4% Liquid 1 Application(s) Topical <User Schedule>  dextrose 5% + sodium chloride 0.9%. 1000 milliLiter(s) (75 mL/Hr) IV Continuous <Continuous>  diltiazem    Tablet 30 milliGRAM(s) Oral every 6 hours  fluorometholone 0.1% Suspension 1 Drop(s) Both EYES daily  levETIRAcetam  IVPB 500 milliGRAM(s) IV Intermittent every 12 hours  memantine 10 milliGRAM(s) Oral daily  pantoprazole   Suspension 40 milliGRAM(s) Oral daily  tamsulosin 0.4 milliGRAM(s) Oral at bedtime  timolol 0.5% Solution 1 Drop(s) Both EYES daily    MEDICATIONS  (PRN):  acetaminophen   Tablet .. 650 milliGRAM(s) Oral every 6 hours PRN Mild Pain (1 - 3) ASCENCION LANGLEY    Patient is a 84y old  Male who presents with a chief complaint of Unwitnessed fall (13 Jun 2019 12:34)    HPI:  85 y/o man with PMH of mild dementia (lives alone at home and independent in ADL's and still driving), HTN, s/p cataract removal, COLTEN on CPAP, BPH, dyslipidemia, hearing impaired (uses hearing aids b/l), presented after fall and found to have SDH and SAH. Pt was admitted under trauma service to ICU. Pt downgraded to GMF on 6/10 and had worsening of mental status repeat CT showed worsening of hematoma and midline shift. NSx evaluated the pt, no interventions recommended.      INTERVAL HPI/OVERNIGHT EVENTS: pt was more calm and interactive yesterday, he was sitting in a chair, today pt is less responsive, looks restless.     ROS: unable to obtain    PHYSICAL EXAM:  T(C): 36.3, Max: 36.3 (06-13-19 @ 15:26)  HR: 72 (68 - 72)  BP: 154/90 (130/70 - 155/67)  RR: 20 (20 - 20)  SpO2: --    GENERAL: restless  PULMONARY/CHEST: No rales, rhonchi, wheezing  CARDIOVASC: Regular rate and rhythm; No murmurs  GI/ABDOMEN: Soft, Nontender, Nondistended; Bowel sounds present  : de la fuente catheter draining clear urine  EXTREMITIES:  2+ Peripheral Pulses, No clubbing, cyanosis, or edema, no deformity. No calf tenderness b/l.  NERVOUS SYSTEM: lethargic, PERRL, moves all 4 extremities, no sensation deficit appreciated    Consultant(s) Notes Reviewed by me.     LABS:                        11.7   10.15 )-----------( 229      ( 13 Jun 2019 07:44 )             35.7     06-13    140  |  106  |  18  ----------------------------<  102<H>  3.4<L>   |  23  |  0.7    Ca    8.0<L>      13 Jun 2019 07:44  Phos  3.0     06-12  Mg     2.0     06-13    TPro  5.1<L>  /  Alb  3.0<L>  /  TBili  0.8  /  DBili  x   /  AST  29  /  ALT  50<H>  /  AlkPhos  24<L>  06-13    Culture - Blood (collected 11 Jun 2019 06:17)  Source: .Blood None  Preliminary Report (12 Jun 2019 14:01):    No growth to date.    Culture - Blood (collected 10 Chato 2019 22:38)  Source: .Blood None  Preliminary Report (12 Jun 2019 07:01):    No growth to date.    CT 6/13: IMPRESSION:    1.  No significant interval change since the previous study 6/10/2019.    2.  Large right frontal lobe hemorrhagic contusion with overlying   subdural hematoma measuring up to 4 mm.    3.  Small left cerebellar and right anterior temporal hemorrhagic   contusions.    4.  Small scattered subarachnoid hemorrhage, small intraventricular   hemorrhage and mild ventriculomegaly.    5.  Small interhemispheric fissure and tentorial subdural hemorrhages.    6.  Left parietal extracalvarial hematoma and left lambdoid suture   diastatic fracture.      MEDICATIONS  (STANDING):  atorvastatin 20 milliGRAM(s) Oral at bedtime  chlorhexidine 4% Liquid 1 Application(s) Topical <User Schedule>  dextrose 5% + sodium chloride 0.9%. 1000 milliLiter(s) (75 mL/Hr) IV Continuous <Continuous>  diltiazem    Tablet 30 milliGRAM(s) Oral every 6 hours  fluorometholone 0.1% Suspension 1 Drop(s) Both EYES daily  levETIRAcetam  IVPB 500 milliGRAM(s) IV Intermittent every 12 hours  memantine 10 milliGRAM(s) Oral daily  pantoprazole   Suspension 40 milliGRAM(s) Oral daily  tamsulosin 0.4 milliGRAM(s) Oral at bedtime  timolol 0.5% Solution 1 Drop(s) Both EYES daily    MEDICATIONS  (PRN):  acetaminophen   Tablet .. 650 milliGRAM(s) Oral every 6 hours PRN Mild Pain (1 - 3)

## 2019-06-14 VITALS
RESPIRATION RATE: 20 BRPM | DIASTOLIC BLOOD PRESSURE: 72 MMHG | TEMPERATURE: 98 F | HEART RATE: 82 BPM | SYSTOLIC BLOOD PRESSURE: 137 MMHG

## 2019-06-14 LAB
ALBUMIN SERPL ELPH-MCNC: 3 G/DL — LOW (ref 3.5–5.2)
ALP SERPL-CCNC: 26 U/L — LOW (ref 30–115)
ALT FLD-CCNC: 48 U/L — HIGH (ref 0–41)
ANION GAP SERPL CALC-SCNC: 13 MMOL/L — SIGNIFICANT CHANGE UP (ref 7–14)
AST SERPL-CCNC: 32 U/L — SIGNIFICANT CHANGE UP (ref 0–41)
BASOPHILS # BLD AUTO: 0.05 K/UL — SIGNIFICANT CHANGE UP (ref 0–0.2)
BASOPHILS NFR BLD AUTO: 0.4 % — SIGNIFICANT CHANGE UP (ref 0–1)
BILIRUB SERPL-MCNC: 0.8 MG/DL — SIGNIFICANT CHANGE UP (ref 0.2–1.2)
BUN SERPL-MCNC: 16 MG/DL — SIGNIFICANT CHANGE UP (ref 10–20)
CALCIUM SERPL-MCNC: 8.4 MG/DL — LOW (ref 8.5–10.1)
CHLORIDE SERPL-SCNC: 103 MMOL/L — SIGNIFICANT CHANGE UP (ref 98–110)
CO2 SERPL-SCNC: 21 MMOL/L — SIGNIFICANT CHANGE UP (ref 17–32)
CREAT SERPL-MCNC: 0.9 MG/DL — SIGNIFICANT CHANGE UP (ref 0.7–1.5)
EOSINOPHIL # BLD AUTO: 0.23 K/UL — SIGNIFICANT CHANGE UP (ref 0–0.7)
EOSINOPHIL NFR BLD AUTO: 1.9 % — SIGNIFICANT CHANGE UP (ref 0–8)
GLUCOSE SERPL-MCNC: 105 MG/DL — HIGH (ref 70–99)
HCT VFR BLD CALC: 37.8 % — LOW (ref 42–52)
HGB BLD-MCNC: 12.4 G/DL — LOW (ref 14–18)
IMM GRANULOCYTES NFR BLD AUTO: 1.1 % — HIGH (ref 0.1–0.3)
LYMPHOCYTES # BLD AUTO: 1.51 K/UL — SIGNIFICANT CHANGE UP (ref 1.2–3.4)
LYMPHOCYTES # BLD AUTO: 12.3 % — LOW (ref 20.5–51.1)
MAGNESIUM SERPL-MCNC: 1.9 MG/DL — SIGNIFICANT CHANGE UP (ref 1.8–2.4)
MCHC RBC-ENTMCNC: 28.9 PG — SIGNIFICANT CHANGE UP (ref 27–31)
MCHC RBC-ENTMCNC: 32.8 G/DL — SIGNIFICANT CHANGE UP (ref 32–37)
MCV RBC AUTO: 88.1 FL — SIGNIFICANT CHANGE UP (ref 80–94)
MONOCYTES # BLD AUTO: 0.87 K/UL — HIGH (ref 0.1–0.6)
MONOCYTES NFR BLD AUTO: 7.1 % — SIGNIFICANT CHANGE UP (ref 1.7–9.3)
NEUTROPHILS # BLD AUTO: 9.52 K/UL — HIGH (ref 1.4–6.5)
NEUTROPHILS NFR BLD AUTO: 77.2 % — HIGH (ref 42.2–75.2)
NRBC # BLD: 0 /100 WBCS — SIGNIFICANT CHANGE UP (ref 0–0)
PLATELET # BLD AUTO: 294 K/UL — SIGNIFICANT CHANGE UP (ref 130–400)
POTASSIUM SERPL-MCNC: 3.8 MMOL/L — SIGNIFICANT CHANGE UP (ref 3.5–5)
POTASSIUM SERPL-SCNC: 3.8 MMOL/L — SIGNIFICANT CHANGE UP (ref 3.5–5)
PROT SERPL-MCNC: 5.8 G/DL — LOW (ref 6–8)
RBC # BLD: 4.29 M/UL — LOW (ref 4.7–6.1)
RBC # FLD: 13.9 % — SIGNIFICANT CHANGE UP (ref 11.5–14.5)
SODIUM SERPL-SCNC: 137 MMOL/L — SIGNIFICANT CHANGE UP (ref 135–146)
WBC # BLD: 12.32 K/UL — HIGH (ref 4.8–10.8)
WBC # FLD AUTO: 12.32 K/UL — HIGH (ref 4.8–10.8)

## 2019-06-14 PROCEDURE — 99232 SBSQ HOSP IP/OBS MODERATE 35: CPT

## 2019-06-14 PROCEDURE — 99239 HOSP IP/OBS DSCHRG MGMT >30: CPT

## 2019-06-14 RX ORDER — LEVETIRACETAM 250 MG/1
5 TABLET, FILM COATED ORAL
Qty: 300 | Refills: 0
Start: 2019-06-14 | End: 2019-07-13

## 2019-06-14 RX ORDER — TAMSULOSIN HYDROCHLORIDE 0.4 MG/1
0 CAPSULE ORAL
Qty: 60 | Refills: 0 | DISCHARGE

## 2019-06-14 RX ORDER — LEVETIRACETAM 250 MG/1
1 TABLET, FILM COATED ORAL
Qty: 60 | Refills: 0
Start: 2019-06-14 | End: 2019-07-13

## 2019-06-14 RX ORDER — FLUOROMETHOLONE 1 MG/ML
0 SOLUTION/ DROPS OPHTHALMIC
Qty: 5 | Refills: 0 | DISCHARGE

## 2019-06-14 RX ORDER — SODIUM CHLORIDE 9 MG/ML
1000 INJECTION, SOLUTION INTRAVENOUS
Qty: 0 | Refills: 0 | DISCHARGE
Start: 2019-06-14

## 2019-06-14 RX ORDER — PANTOPRAZOLE SODIUM 20 MG/1
0 TABLET, DELAYED RELEASE ORAL
Qty: 0 | Refills: 0 | DISCHARGE
Start: 2019-06-14

## 2019-06-14 RX ORDER — TIMOLOL 0.5 %
1 DROPS OPHTHALMIC (EYE)
Qty: 0 | Refills: 0 | DISCHARGE
Start: 2019-06-14

## 2019-06-14 RX ORDER — DILTIAZEM HCL 120 MG
1 CAPSULE, EXT RELEASE 24 HR ORAL
Qty: 0 | Refills: 0 | DISCHARGE
Start: 2019-06-14

## 2019-06-14 RX ORDER — AMLODIPINE BESYLATE 2.5 MG/1
0 TABLET ORAL
Qty: 90 | Refills: 0 | DISCHARGE

## 2019-06-14 RX ORDER — ACETAMINOPHEN 500 MG
2 TABLET ORAL
Qty: 0 | Refills: 0 | DISCHARGE
Start: 2019-06-14

## 2019-06-14 RX ORDER — ATENOLOL 25 MG/1
0 TABLET ORAL
Qty: 90 | Refills: 0 | DISCHARGE

## 2019-06-14 RX ORDER — DILTIAZEM HCL 120 MG
30 CAPSULE, EXT RELEASE 24 HR ORAL ONCE
Refills: 0 | Status: COMPLETED | OUTPATIENT
Start: 2019-06-14 | End: 2019-06-14

## 2019-06-14 RX ORDER — TIMOLOL 0.5 %
0 DROPS OPHTHALMIC (EYE)
Qty: 5 | Refills: 0 | DISCHARGE

## 2019-06-14 RX ORDER — HYDRALAZINE HCL 50 MG
50 TABLET ORAL ONCE
Refills: 0 | Status: DISCONTINUED | OUTPATIENT
Start: 2019-06-14 | End: 2019-06-14

## 2019-06-14 RX ORDER — TAMSULOSIN HYDROCHLORIDE 0.4 MG/1
1 CAPSULE ORAL
Qty: 30 | Refills: 0
Start: 2019-06-14 | End: 2019-07-13

## 2019-06-14 RX ADMIN — MEMANTINE HYDROCHLORIDE 10 MILLIGRAM(S): 10 TABLET ORAL at 11:07

## 2019-06-14 RX ADMIN — Medication 650 MILLIGRAM(S): at 01:05

## 2019-06-14 RX ADMIN — FLUOROMETHOLONE 1 DROP(S): 1 SOLUTION/ DROPS OPHTHALMIC at 11:09

## 2019-06-14 RX ADMIN — Medication 650 MILLIGRAM(S): at 06:11

## 2019-06-14 RX ADMIN — PANTOPRAZOLE SODIUM 40 MILLIGRAM(S): 20 TABLET, DELAYED RELEASE ORAL at 11:09

## 2019-06-14 RX ADMIN — Medication 650 MILLIGRAM(S): at 11:07

## 2019-06-14 RX ADMIN — Medication 1 DROP(S): at 11:09

## 2019-06-14 RX ADMIN — CHLORHEXIDINE GLUCONATE 1 APPLICATION(S): 213 SOLUTION TOPICAL at 06:11

## 2019-06-14 RX ADMIN — LEVETIRACETAM 420 MILLIGRAM(S): 250 TABLET, FILM COATED ORAL at 06:12

## 2019-06-14 RX ADMIN — Medication 30 MILLIGRAM(S): at 11:10

## 2019-06-14 RX ADMIN — Medication 650 MILLIGRAM(S): at 06:12

## 2019-06-14 RX ADMIN — Medication 30 MILLIGRAM(S): at 01:04

## 2019-06-14 RX ADMIN — Medication 650 MILLIGRAM(S): at 01:04

## 2019-06-14 RX ADMIN — Medication 30 MILLIGRAM(S): at 08:59

## 2019-06-14 RX ADMIN — Medication 30 MILLIGRAM(S): at 06:12

## 2019-06-14 NOTE — DISCHARGE NOTE PROVIDER - HOSPITAL COURSE
83 YO M DNR;/DNI, w/PMHx of Dementia, unsteady gait/falls, HTN, DLD, BPH, Glaucoma, who was BIBEMS after unwitnessed fall (+HT, -LOC, -A/C) and was found to have subdural/subarachnoid hemorrhage, parietal/occipital extracalvarial hematoma and frontal/temporal/cerebellar hemorrhages. He was admitted SICU, where he was followed very closely by Neurosurgery, syncope w/u was negative, he was made DNR/DNI and later transferred to Medicine floor for medical optimization as hospital course was complicated by worsening leukocytosis, mental status and ALPHONSO on 06/10/19. A rapid response was called on 06/10/19 just after the pt was transferred to Medicine floor for unresponsiveness, and a stat repeat CTH showed worsening hematoma and new 5 mm midline shift. Neurosurgery was recalled and they suspected the pt was likely having a seizure. Neurology was recalled and VEEG was rec. and done, which showed no seizure activity. Neuro rec to continue Keppra 500 bid for seizure precaution.        ALPHONSO resolved w/IVF hydration and de la fuente, US of the kidneys was unremarkable. For the leukocytosis, he was worked up for possible infectious etiology and w/u was completely negative, but the pt's leukocytosis has been waxing and waning. Speech and swallow was consulted and it was recommended to keep pt NPO until he is mentally more alert to PO  tolerate diet.         Pt's mental status continued to deteriorate and he is very lethargic now, nonverbal, periodically follows command. Palliative was consulted and after discussion with the family, he was made comfort care only w/comfort feeds. Hospice was consulted and family decided to on discharging the patient to hospice 85 YO M DNR;/DNI, w/PMHx of Dementia, unsteady gait/falls, HTN, DLD, BPH, Glaucoma, who was BIBEMS after unwitnessed fall (+HT, -LOC, -A/C) and was found to have subdural/subarachnoid hemorrhage, parietal/occipital extracalvarial hematoma and frontal/temporal/cerebellar hemorrhages. He was admitted SICU, where he was followed very closely by Neurosurgery, no surgical interventions planned. Syncope w/u was negative. After transfer from SICU mental status worsened and repeat CTH showed Interval enlargement of a right frontoparietal hemorrhagic contusion with increased mass effect upon the anterior horn of the right lateral ventricle. There is new right to left midline shift of approximately 5 mm. NSx reviewed images and no surgical interventiones recommended. Pt was given Mannitol IV once with no changes in mental status. VEEG was negative for seizure. Pt had urinary retention and Lee was placed, recommend to proceed with TOV in SNF.     Pt's mental status continued to deteriorate and he is very lethargic now, nonverbal, periodically follows command. Palliative was consulted and after discussion with the family, he was made comfort care only w/comfort feeds. Hospice was consulted and family decided to on discharging the patient to SNF with transition to hospice in [place].

## 2019-06-14 NOTE — PROGRESS NOTE ADULT - NSHPATTENDINGPLANDISCUSS_GEN_ALL_CORE
residents, nursing, , patient
residents, nursing, , patient's family
Trauma Team
residents, nursing, , patient

## 2019-06-14 NOTE — PROGRESS NOTE ADULT - ASSESSMENT
84yMale being evaluated for follow up    Patient is more lethargic and less responsive today.   Repeat CT 6/13 with  no significant changes. Family requested to speak with palliative team. Angeline BARAKAT, decided to initiate comfort measures with hospice care in a nursing home in [facility]. Daughter wanted to wait till next week Tuesday prior, but family collectively decided to honor patient's wish for no aggressive intervention and opt for comfort measures, with comfort feeds if patient is awake.  Palliative team will continue to provide supportive care.    Case d/w Resident/ Attending      Recommendations:  Continue with Keppra   Comfort measures only  No further escalation of care   No further blood draws, fingerstick, scan/images  Comfort feed ok if patient awake  Dispo: Hospice in SNF

## 2019-06-14 NOTE — DISCHARGE NOTE PROVIDER - PROVIDER TOKENS
FREE:[LAST:[MD at [facility]],PHONE:[(   )    -],FAX:[(   )    -]] FREE:[LAST:[MD at Nursing Home],PHONE:[(   )    -],FAX:[(   )    -]],FREE:[LAST:[Hospice],PHONE:[(   )    -],FAX:[(   )    -]]

## 2019-06-14 NOTE — PROGRESS NOTE ADULT - ASSESSMENT
83 YO M DNR;/DNI, w/PMHx of Dementia, unsteady gait/falls, HTN, DLD, BPH, Glaucoma, was  BIBEMS, after unwitnessed fall (+HT, -LOC, -AC) and was found to have subdural/subarachnoid hemorrhage, parietal/occipital extracalvarial hematoma and frontal/temporal/cerebellar hemorrhages. He was downgraded from SICU and transferred to Medicine for medical optimization after hospital course was complicated by worsening leukocytosis, mental status and ALPHONSO on 06/10/19    #S/P unwitnessed fall/SDH/SAH/Frontal and Temporal hemorrhagic Contusion/Diastatic Nondisplaced Fracture of Cerebellun: not improving: worse  - CT head stable  - Neurosurgery recalled for worsening mental status  - Pt is a high risk for sudden worsening of condition including death, high risk for seizure   - Neuro apprec. Rpt CTH  - Syncope w/u: negative  - Physiatry: STR in STR  - Speech and swallow apprec, recs noted    #ALPHONSO resolved  - Cr 0.7  - D/c de la fuente, TOV  - US Kidneys/Bladder: unremarkable  - Cont IV hydration  - Strict Is&Os, Weight QD  - Avoid nephrotoxic meds    #Leukocytosis resolved  - Sepsis w/u negative so far  - Repeat CXR     #BPH  - Cont Tamsulosin 0.4 qhs  - D/c de la fuente, TOV    #HTN: mildly elevated  - Cont Cardizem  - Monitor BP closely    #DLD  - Cont Lipitor    #Dementia:   - Cont current meds    #DVT ppx: SCD  #Activity: ambulate w/assistance  #Diet: NPO for now  #Dispo: Acute  #DNR/DNI 85 YO M DNR;/DNI, w/PMHx of Dementia, unsteady gait/falls, HTN, DLD, BPH, Glaucoma, was  BIBEMS, after unwitnessed fall (+HT, -LOC, -AC) and was found to have subdural/subarachnoid hemorrhage, parietal/occipital extracalvarial hematoma and frontal/temporal/cerebellar hemorrhages. He was downgraded from SICU and transferred to Medicine for medical optimization after hospital course was complicated by worsening leukocytosis, mental status and ALPHONSO on 06/10/19    #S/P unwitnessed fall/SDH/SAH/Frontal and Temporal hemorrhagic Contusion/Diastatic Nondisplaced Fracture of Cerebellun: not improving: worse  - CT head stable  - Neurosurgery recalled for worsening mental status  - Pt is a high risk for sudden worsening of condition including death, high risk for seizure   - Neuro f/u apprec, cont same mgt  - Syncope w/u: negative  - Physiatry: STR in STR  - Speech and swallow apprec, recs noted    #ALPHONSO resolved  - Cr stable  - de la fuente  - US Kidneys/Bladder: unremarkable  - Cont IV hydration  - Strict Is&Os, Weight QD  - Avoid nephrotoxic meds    #Leukocytosis waxing and waning  - Sepsis w/u negative so far  - Repeat CXR no pathology    #BPH  - Cont Tamsulosin 0.4 qhs  - de la fuente    #HTN: better  - Cont Cardizem  - Monitor BP closely    #DLD  - Cont Lipitor    #Dementia:   - Cont current meds    #DVT ppx: SCD  #Activity: ambulate w/assistance  #Diet: NPO for now  #Dispo: D/c to NH  #DNR/DNI

## 2019-06-14 NOTE — DISCHARGE NOTE PROVIDER - NSDCCPCAREPLAN_GEN_ALL_CORE_FT
PRINCIPAL DISCHARGE DIAGNOSIS  Diagnosis: Subdural hematoma due to concussion, with loss of consciousness, sequela  Assessment and Plan of Treatment: S/P unwitnessed fall/SDH/SAH/Frontal and Temporal hemorrhagic Contusion/Diastatic Nondisplaced Fracture of Cerebellun: not improving: worse  - CT head stable  - Neurosurgery recalled for worsening mental status  - Pt is a high risk for sudden worsening of condition including death, high risk for seizure   - Neuro f/u apprec, cont same mgt  - Syncope w/u: negative  - Physiatry: STR in STR  - Speech and swallow apprec, recs noted        SECONDARY DISCHARGE DIAGNOSES  Diagnosis: Subarachnoid hemorrhage, traumatic  Assessment and Plan of Treatment: S/P unwitnessed fall/SDH/SAH/Frontal and Temporal hemorrhagic Contusion/Diastatic Nondisplaced Fracture of Cerebellun: not improving: worse  - CT head stable  - Neurosurgery recalled for worsening mental status  - Pt is a high risk for sudden worsening of condition including death, high risk for seizure   - Neuro f/u apprec, cont same mgt  - Syncope w/u: negative  - Physiatry: STR in STR  - Speech and swallow apprec, recs noted      Diagnosis: Fall  Assessment and Plan of Treatment: S/P unwitnessed fall/SDH/SAH/Frontal and Temporal hemorrhagic Contusion/Diastatic Nondisplaced Fracture of Cerebellun: not improving: worse  - CT head stable  - Neurosurgery recalled for worsening mental status  - Pt is a high risk for sudden worsening of condition including death, high risk for seizure   - Neuro f/u apprec, cont same mgt  - Syncope w/u: negative  - Physiatry: STR in STR  - Speech and swallow apprec, recs noted PRINCIPAL DISCHARGE DIAGNOSIS  Diagnosis: Subdural hematoma due to concussion, with loss of consciousness, sequela  Assessment and Plan of Treatment: S/P unwitnessed fall/SDH/SAH/Frontal and Temporal hemorrhagic Contusion/Diastatic Nondisplaced Fracture of Cerebellun: worsening mental status. Pt's family is willing to proceed with comfort measures only. Continue Keppra 500 mg BID for seizure prophylaxis. Recommended comfort feed, p did not want feeding tubes.         SECONDARY DISCHARGE DIAGNOSES  Diagnosis: Urinary retention  Assessment and Plan of Treatment: Please attempt TOV in SNF. Pt was retaining and failed TOV once in the hospital.

## 2019-06-14 NOTE — PROGRESS NOTE ADULT - SUBJECTIVE AND OBJECTIVE BOX
ASCENCION LANGLEY    Patient is a 84y old  Male who presents with a chief complaint of Unwitnessed fall (13 Jun 2019 16:13)    HPI:  83 y/o man with PMH of mild dementia (lives alone at home and independent in ADL's and still driving), HTN, s/p cataract removal, COLTEN on CPAP, BPH, dyslipidemia, hearing impaired (uses hearing aids b/l), presented after fall and found to have SDH and SAH. Pt was admitted under trauma service to ICU. Pt downgraded to GMF on 6/10 and had worsening of mental status repeat CT showed worsening of hematoma and midline shift. NSx evaluated the pt after changes on CT, no interventions recommended.  Pt was given Mannitol x1 with no improvement in MS.     INTERVAL HPI/OVERNIGHT EVENTS: no events overnight, pt still lethargic, less restless, more comfortable, he opens eyes, but not interactive. Since last night BP on high side.    ROS: unable to obtain due to lethargy    PHYSICAL EXAM:  T(C): 36.9, Max: 37.9 (06-13-19 @ 22:00)  HR: 75 (72 - 97)  BP: 156/65 (145/83 - 189/80)  RR: 20 (2 - 20)  SpO2: --    GENERAL: lethargic  PULMONARY/CHEST: No rales, rhonchi or wheezing  CARDIOVASC: Regular rate and rhythm; No murmurs  GI/ABDOMEN: Soft, Nontender, Nondistended; Bowel sounds present  EXTREMITIES:  2+ Peripheral Pulses, No clubbing, cyanosis, or edema, no deformity. No calf tenderness b/l.  NERVOUS SYSTEM: lethargic, opens eyes on voice, moves spontaneously all 4 extremities     Consultant(s) Notes Reviewed by me.   Care Discussed with NSx     LABS:                        12.4   12.32 )-----------( 294      ( 14 Jun 2019 06:39 )             37.8     06-14    137  |  103  |  16  ----------------------------<  105<H>  3.8   |  21  |  0.9    Ca    8.4<L>      14 Jun 2019 06:39  Mg     1.9     06-14    TPro  5.8<L>  /  Alb  3.0<L>  /  TBili  0.8  /  DBili  x   /  AST  32  /  ALT  48<H>  /  AlkPhos  26<L>  06-14      MEDICATIONS  (STANDING):  acetaminophen   Tablet .. 650 milliGRAM(s) Oral every 6 hours  atorvastatin 20 milliGRAM(s) Oral at bedtime  chlorhexidine 4% Liquid 1 Application(s) Topical <User Schedule>  dextrose 5% + sodium chloride 0.9%. 1000 milliLiter(s) (75 mL/Hr) IV Continuous <Continuous>  diltiazem    Tablet 30 milliGRAM(s) Oral every 6 hours  fluorometholone 0.1% Suspension 1 Drop(s) Both EYES daily  levETIRAcetam  IVPB 500 milliGRAM(s) IV Intermittent every 12 hours  memantine 10 milliGRAM(s) Oral daily  pantoprazole   Suspension 40 milliGRAM(s) Oral daily  tamsulosin 0.4 milliGRAM(s) Oral at bedtime  timolol 0.5% Solution 1 Drop(s) Both EYES daily    MEDICATIONS  (PRN):

## 2019-06-14 NOTE — CHART NOTE - NSCHARTNOTEFT_GEN_A_CORE
Pt's family want pt to be discharge to NH in [place] today with transition to hospice there. Pt is stable for discharge and transportation. Again discussed with daughter at bedside and all questions answered.

## 2019-06-14 NOTE — PROGRESS NOTE ADULT - SUBJECTIVE AND OBJECTIVE BOX
DAILY PROGRESS NOTE  ===========================================================    Patient Information:  ASCENCION LANGLEY  /  84y  /  Male  /  MRN#: [id number]    Hospital Day: 9d     |:::::::::::::::::::::::::::| SUBJECTIVE |:::::::::::::::::::::::::::|    OVERNIGHT EVENTS: No overnight events    TODAY: Patient was seen today at bedside. Pt's mental status is worse today, he is more lethargic and less responsive, nonverbal but inconsistently following command    |:::::::::::::::::::::::::::| OBJECTIVE |:::::::::::::::::::::::::::|    VITAL SIGNS: Last 24 Hours  T(C): 36.9 (14 Jun 2019 12:30), Max: 37.9 (13 Jun 2019 22:00)  T(F): 98.5 (14 Jun 2019 12:30), Max: 100.2 (13 Jun 2019 22:00)  HR: 87 (14 Jun 2019 12:30) (72 - 97)  BP: 131/74 (14 Jun 2019 12:30) (131/74 - 189/80)  BP(mean): --  ABP: --  ABP(mean): --  RR: 20 (14 Jun 2019 12:30) (2 - 20)  SpO2: --    PHYSICAL EXAM:  GENERAL: Patient was lying in bed, on O2 by NC, lethargic, inconsistently following command  HEENT: Head NC/Traumatic/Fractures, Conjunctivae clear, Sclera anicteric; w/creamy discharge from both eyes, pupils were normal size and reactive to light. Oral mucosa moist.  CARDIO: Regular rate; Regular rhythm; S1 & S2.  RESP: Coarse b/l  GI: Distended, firm; +BS; No guarding; No rebound tenderness.  EXT: No edema in UE and LE.  NEURO: Moving extremities voluntarily but not following command, responding inappropriately   SKIN: Ecchymosis in UEs    Lee catheter: draining clear yellow urine w/o blood    LAB RESULTS:                            12.2   11.26 )-----------( 213      ( 12 Jun 2019 06:23 )             36.9     06-12    141  |  107  |  19  ----------------------------<  119<H>  3.6   |  21  |  0.7    Ca    8.1<L>      12 Jun 2019 06:23  Phos  3.0     06-12  Mg     1.9     06-12    TPro  5.3<L>  /  Alb  3.0<L>  /  TBili  0.8  /  DBili  x   /  AST  35  /  ALT  57<H>  /  AlkPhos  24<L>  06-12    MICROBIOLOGY:        RADIOLOGY:    CT Head: 06/04/19    1.  Large left parietal/occipital extracalvarial hematoma. Slight   widening of the left lambdoid suture suspicious for nondisplaced   diastatic fracture.    2.  Subdural hemorrhage in the right frontotemporal convexity measuring   up to 8 mm in width and within the left cerebellopontine angle cistern   measuring 8 mm in width.    3.  Hemorrhagic contusions in the right frontal and temporal lobes and   left cerebellum.      4.  Scattered subarachnoid hemorrhage and small layering intraventricular   hemorrhage.    CT Head: 06/05/19    1.  Subdural hemorrhage over the right frontal and temporal convexities   and left cerebellopontine angle cistern, not significantly changed since   the prior study.    2.  Stable right frontal, right temporal and left cerebellar contusions.     3.  No midline shift. Stable ventricular size.    4.  Scattered subarachnoid hemorrhage and small intraventricular   hemorrhage are again noted.     5.  Redemonstration of large left parietal/occipital extracalvarial   hematoma and soft tissue swelling with slight widening of the left   lambdoid suture.    6.  Visualized paranasal sinuses and bilateral mastoid air cells are   well-aerated.    CT Head 06/10/19    1.  Interval enlargement of a right frontoparietal hemorrhagic contusion   with increased mass effect upon the anterior horn of the right lateral   ventricle. There is new right to left midline shift of approximately 5 mm.    2.  Slight interval increase in size of a small amount of layering blood  within the lateral ventricles.    3.  Stable subdural hematomas over the right frontal temporal convexity   and left cerebellopontine angle cistern.    4.  Overall stable appearance of subarachnoid hemorrhage within the right   frontal and temporallobe sulci.    5.  No definite evidence of new parenchymal infarction.    6.  Stable left occipital extra-axial scalp hematoma. Decreased soft   tissue edema within the left parietal scalp. Stable mild diastasis of the   left lambdoid suture.        ALLERGIES:  No Known Allergies    HOME MEDICATIONS:  AMLODIPINE   TAB 2.5MG:  (04 Jun 2019 23:38)  ATENOLOL     TAB 25MG:  (04 Jun 2019 23:38)  FLUOROMETHOL ELLA 0.1% OP:  (04 Jun 2019 23:38)  MEMANTINE    TAB HCL 10MG:  (04 Jun 2019 23:38)  ROSUVASTATIN TAB 5MG:  (04 Jun 2019 23:38)  TAMSULOSIN   CAP 0.4MG:  (04 Jun 2019 23:38)  TIMOLOL MAL  SOL 0.5% OP:  (04 Jun 2019 23:38)    =========================================================== DAILY PROGRESS NOTE  ===========================================================    Patient Information:  ASCENCION LANGLEY  /  84y  /  Male  /  MRN#: [id number]    Hospital Day: 9d     |:::::::::::::::::::::::::::| SUBJECTIVE |:::::::::::::::::::::::::::|    OVERNIGHT EVENTS: No overnight events    TODAY: Patient was seen today at bedside. Pt's mental status is worse today, he is more lethargic and less responsive, nonverbal but inconsistently following command    |:::::::::::::::::::::::::::| OBJECTIVE |:::::::::::::::::::::::::::|    VITAL SIGNS: Last 24 Hours  T(C): 36.9 (14 Jun 2019 12:30), Max: 37.9 (13 Jun 2019 22:00)  T(F): 98.5 (14 Jun 2019 12:30), Max: 100.2 (13 Jun 2019 22:00)  HR: 87 (14 Jun 2019 12:30) (72 - 97)  BP: 131/74 (14 Jun 2019 12:30) (131/74 - 189/80)  BP(mean): --  ABP: --  ABP(mean): --  RR: 20 (14 Jun 2019 12:30) (2 - 20)  SpO2: --    PHYSICAL EXAM:  GENERAL: Patient was lying in bed, on O2 by NC, lethargic, inconsistently following command  HEENT: Head NC/Traumatic/Fractures, Conjunctivae clear, Sclera anicteric; w/creamy discharge from both eyes, pupils were normal size and reactive to light. Oral mucosa moist.  CARDIO: Regular rate; Regular rhythm; S1 & S2.  RESP: Coarse b/l  GI: Distended, soft; +BS; No guarding; No rebound tenderness.  EXT: No edema in UE and LE.  NEURO: Moving extremities voluntarily but not following command, responding inappropriately   SKIN: Multiple bruises all over the body and extremities    Lee catheter: draining clear yellow urine w/o blood    LAB RESULTS:                           12.4   12.32 )-----------( 294      ( 14 Jun 2019 06:39 )             37.8      06-14    137  |  103  |  16  ----------------------------<  105<H>  3.8   |  21  |  0.9    Ca    8.4<L>      14 Jun 2019 06:39  Mg     1.9     06-14    TPro  5.8<L>  /  Alb  3.0<L>  /  TBili  0.8  /  DBili  x   /  AST  32  /  ALT  48<H>  /  AlkPhos  26<L>  06-14                     MICROBIOLOGY:        RADIOLOGY:    CT Head: 06/04/19    1.  Large left parietal/occipital extracalvarial hematoma. Slight   widening of the left lambdoid suture suspicious for nondisplaced   diastatic fracture.    2.  Subdural hemorrhage in the right frontotemporal convexity measuring   up to 8 mm in width and within the left cerebellopontine angle cistern   measuring 8 mm in width.    3.  Hemorrhagic contusions in the right frontal and temporal lobes and   left cerebellum.      4.  Scattered subarachnoid hemorrhage and small layering intraventricular   hemorrhage.    CT Head: 06/05/19    1.  Subdural hemorrhage over the right frontal and temporal convexities   and left cerebellopontine angle cistern, not significantly changed since   the prior study.    2.  Stable right frontal, right temporal and left cerebellar contusions.     3.  No midline shift. Stable ventricular size.    4.  Scattered subarachnoid hemorrhage and small intraventricular   hemorrhage are again noted.     5.  Redemonstration of large left parietal/occipital extracalvarial   hematoma and soft tissue swelling with slight widening of the left   lambdoid suture.    6.  Visualized paranasal sinuses and bilateral mastoid air cells are   well-aerated.    CT Head 06/10/19    1.  Interval enlargement of a right frontoparietal hemorrhagic contusion   with increased mass effect upon the anterior horn of the right lateral   ventricle. There is new right to left midline shift of approximately 5 mm.    2.  Slight interval increase in size of a small amount of layering blood  within the lateral ventricles.    3.  Stable subdural hematomas over the right frontal temporal convexity   and left cerebellopontine angle cistern.    4.  Overall stable appearance of subarachnoid hemorrhage within the right   frontal and temporallobe sulci.    5.  No definite evidence of new parenchymal infarction.    6.  Stable left occipital extra-axial scalp hematoma. Decreased soft   tissue edema within the left parietal scalp. Stable mild diastasis of the   left lambdoid suture.        ALLERGIES:  No Known Allergies    HOME MEDICATIONS:  AMLODIPINE   TAB 2.5MG:  (04 Jun 2019 23:38)  ATENOLOL     TAB 25MG:  (04 Jun 2019 23:38)  FLUOROMETHOL ELLA 0.1% OP:  (04 Jun 2019 23:38)  MEMANTINE    TAB HCL 10MG:  (04 Jun 2019 23:38)  ROSUVASTATIN TAB 5MG:  (04 Jun 2019 23:38)  TAMSULOSIN   CAP 0.4MG:  (04 Jun 2019 23:38)  TIMOLOL MAL  SOL 0.5% OP:  (04 Jun 2019 23:38)    ===========================================================

## 2019-06-14 NOTE — PROGRESS NOTE ADULT - ASSESSMENT
# s/p unwitnessed fall (?syncope) with resultant SDH, contusions, SAH and small IVH and large left parietal/occipital hematoma.  - mental status with no changes, repeat CT 6/13 with  no significant changes  - s/p Mannitol x1, 30 mg IV  - NSx for f/u  - Neurology eval appreciated  - cont Keppra for seizure ppx  - fall precautions  - had syncopal workup including ECHO, carotid doppler, unit monitoring (was on tele monitor)  - comfort feeds - pureed with nectar thick    Discussed with daughter Fermin (HCP) and other daughter at bedside current situation and prognosis, explained that this is probably his new baseline, discussed feeding, pt has discussion with daughters prior and stated he does not want feeding tubes. Family decided on hospice care, comfort feeds, they want him to be discharged to a facility in [place].     # Urinary retention - cont Lee  # COLTEN - pt with altered mental status, would not start CPAP at this point    # Hearing impaired - use aids    # HTN - BP on high side, increase Cardizem to 60, close monitoring of BP. Keep -140    # Dyslipidemia on statin    # Continue current management for dementia and BPH    DVT prophylaxis - SCD    Prognosis is guarded, discussed with daughter and son, they wanted to see the progression and make a decision regarding hospice.    #Progress Note Handoff  Pending   Consults: hospice  Tests: none  Test Results: none  Family Discussion: ongoing  Future Disposition: likely inpatient hospice

## 2019-06-14 NOTE — PROGRESS NOTE ADULT - SUBJECTIVE AND OBJECTIVE BOX
84yMale with diagnosis: SUBDURAL HEMATOMA DUE TO CONCUSSION WITH LOC, SEQUELA;FALL      Patient seen, awake and confused. Not following command.      PHYSICAL EXAM  General: Age appropriate male, lethargic   Eye: PERRL  Lungs: -dyspnea   Cardiac: S1S2 RRR  Abd: soft, non tender/distended  Ext: moves all ext.       T(C): , Max: 37.9 (22:00)  T(F): 98.5  HR: 87 (72 - 97)  BP: 131/74 (131/74 - 189/80)  RR: 20 (2 - 20)  SpO2: --                                    12.4   12.32 )-----------( 294      ( 14 Jun 2019 06:39 )             37.8                                                                                      06-14    137  |  103  |  16  ----------------------------<  105<H>  3.8   |  21  |  0.9    Ca    8.4<L>      14 Jun 2019 06:39  Mg     1.9     06-14    TPro  5.8<L>  /  Alb  3.0<L>  /  TBili  0.8  /  DBili  x   /  AST  32  /  ALT  48<H>  /  AlkPhos  26<L>  06-14                                                      MEDICATIONS  (STANDING):  acetaminophen   Tablet .. 650 milliGRAM(s) Oral every 6 hours  atorvastatin 20 milliGRAM(s) Oral at bedtime  chlorhexidine 4% Liquid 1 Application(s) Topical <User Schedule>  dextrose 5% + sodium chloride 0.9%. 1000 milliLiter(s) (75 mL/Hr) IV Continuous <Continuous>  diltiazem    Tablet 30 milliGRAM(s) Oral every 6 hours  fluorometholone 0.1% Suspension 1 Drop(s) Both EYES daily  levETIRAcetam  IVPB 500 milliGRAM(s) IV Intermittent every 12 hours  memantine 10 milliGRAM(s) Oral daily  pantoprazole   Suspension 40 milliGRAM(s) Oral daily  tamsulosin 0.4 milliGRAM(s) Oral at bedtime  timolol 0.5% Solution 1 Drop(s) Both EYES daily    MEDICATIONS  (PRN):

## 2019-06-14 NOTE — DISCHARGE NOTE PROVIDER - CARE PROVIDER_API CALL
MD at Nursing Home,   Phone: (   )    -  Fax: (   )    -  Follow Up Time: MD at Nursing Home,   Phone: (   )    -  Fax: (   )    -  Follow Up Time:     Hospice,   Phone: (   )    -  Fax: (   )    -  Follow Up Time:

## 2019-06-14 NOTE — DISCHARGE NOTE NURSING/CASE MANAGEMENT/SOCIAL WORK - NSDCDPATPORTLINK_GEN_ALL_CORE
You can access the [facility] Patient Portal, offered by [facility], by registering with the following website: [email or web address]

## 2019-06-14 NOTE — CHART NOTE - NSCHARTNOTEFT_GEN_A_CORE
Registered Dietitian Follow-Up     Patient Profile Reviewed                           Yes [x]   No []     Nutrition History Previously Obtained        Yes [x]  No []       Pertinent Subjective Information: Spoke with SAM (Dr. Pineda) who states that he just put in a c/s for hospice today. Right now there are no plans for artificial feeding; pending POC.      Pertinent Medical Interventions: Palliative team discussed with daughter Angeline HCP yesterday, and she wanted to continue with ongoing treatment  till next week Tuesday. Daughter will assume comfort measures/ hospice care in a facility at that time if no improvement in status.  Other siblings at bedside son and daughter) said if repeat ct head shows worsening edema, they will speak with Angeline (HCP) and might assume comfort measures sooner than next Tuesday.      Diet order: NPO      Anthropometrics:  - Ht. 68"  - Wt. no new wts   - %wt change  - BMI: 26.9  - IBW: 154#     Pertinent Lab Data: Reviewed (6/14): Glu 105     Pertinent Meds: D5NS, nifedipine, keppra, atenolol, lipitor, tamsulosin, protonix, cardizem     Physical Findings:  - Appearance: confused, disoriented; no edema noted   - GI function: LBM 6/10   - Oral/Mouth cavity: per SLP recs on 6/13--NPO w/ alternate means of nutrition/hydration  - Skin: ecchymosis      Nutrition Requirements  Weight Used: CBW: 176#/80kg; needs continued from RD note on 6/11      Calories: 4393-3368 kcal/day (MSJ x 1-1.2 AF)--advanced age considered  Protein: 72-88 gm/day (0.9-1.1 gm/kg CBW)  Fluid: 1 ml/kcal     Nutrient Intake: not meeting kcal/pro needs at this time d/t NPO status      Previous Nutrition Diagnosis: Inadequate oral intake (ongoing)    Nutrition Intervention: meals and snacks, medical food supplements     Recommendations:  1. Monitor POC; continue diet per SLP recs    Goal/Expected Outcome: Nutrition goals not appropriate at this time; will f/u with goals of care in 3 days       Indicator/Monitoring: RD to monitor POC, diet order, energy intake, glucose profile, nutrition focused physical findings

## 2019-06-14 NOTE — PROGRESS NOTE ADULT - REASON FOR ADMISSION
Unwitnessed fall

## 2019-06-16 LAB
CULTURE RESULTS: SIGNIFICANT CHANGE UP
CULTURE RESULTS: SIGNIFICANT CHANGE UP
SPECIMEN SOURCE: SIGNIFICANT CHANGE UP
SPECIMEN SOURCE: SIGNIFICANT CHANGE UP

## 2019-06-20 DIAGNOSIS — S06.6X9A TRAUMATIC SUBARACHNOID HEMORRHAGE WITH LOSS OF CONSCIOUSNESS OF UNSPECIFIED DURATION, INITIAL ENCOUNTER: ICD-10-CM

## 2019-06-20 DIAGNOSIS — R41.0 DISORIENTATION, UNSPECIFIED: ICD-10-CM
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
## 2019-06-20 DIAGNOSIS — R40.2362 COMA SCALE, BEST MOTOR RESPONSE, OBEYS COMMANDS, AT ARRIVAL TO EMERGENCY DEPARTMENT: ICD-10-CM

## 2019-06-20 DIAGNOSIS — S09.90XA UNSPECIFIED INJURY OF HEAD, INITIAL ENCOUNTER: ICD-10-CM

## 2019-06-20 DIAGNOSIS — S06.1X0A TRAUMATIC CEREBRAL EDEMA WITHOUT LOSS OF CONSCIOUSNESS, INITIAL ENCOUNTER: ICD-10-CM

## 2019-06-20 DIAGNOSIS — E78.5 HYPERLIPIDEMIA, UNSPECIFIED: ICD-10-CM

## 2019-06-20 DIAGNOSIS — D72.819 DECREASED WHITE BLOOD CELL COUNT, UNSPECIFIED: ICD-10-CM

## 2019-06-20 DIAGNOSIS — S02.91XA UNSPECIFIED FRACTURE OF SKULL, INITIAL ENCOUNTER FOR CLOSED FRACTURE: ICD-10-CM

## 2019-06-20 DIAGNOSIS — Z97.4 PRESENCE OF EXTERNAL HEARING-AID: ICD-10-CM

## 2019-06-20 DIAGNOSIS — G47.33 OBSTRUCTIVE SLEEP APNEA (ADULT) (PEDIATRIC): ICD-10-CM

## 2019-06-20 DIAGNOSIS — S06.5X9A TRAUMATIC SUBDURAL HEMORRHAGE WITH LOSS OF CONSCIOUSNESS OF UNSPECIFIED DURATION, INITIAL ENCOUNTER: ICD-10-CM

## 2019-06-20 DIAGNOSIS — Z51.5 ENCOUNTER FOR PALLIATIVE CARE: ICD-10-CM

## 2019-06-20 DIAGNOSIS — R33.9 RETENTION OF URINE, UNSPECIFIED: ICD-10-CM

## 2019-06-20 DIAGNOSIS — I10 ESSENTIAL (PRIMARY) HYPERTENSION: ICD-10-CM

## 2019-06-20 DIAGNOSIS — R40.2142 COMA SCALE, EYES OPEN, SPONTANEOUS, AT ARRIVAL TO EMERGENCY DEPARTMENT: ICD-10-CM

## 2019-06-20 DIAGNOSIS — R26.2 DIFFICULTY IN WALKING, NOT ELSEWHERE CLASSIFIED: ICD-10-CM

## 2019-06-20 DIAGNOSIS — H91.90 UNSPECIFIED HEARING LOSS, UNSPECIFIED EAR: ICD-10-CM

## 2019-06-20 DIAGNOSIS — H40.9 UNSPECIFIED GLAUCOMA: ICD-10-CM

## 2019-06-20 DIAGNOSIS — N17.9 ACUTE KIDNEY FAILURE, UNSPECIFIED: ICD-10-CM

## 2019-06-20 DIAGNOSIS — N40.0 BENIGN PROSTATIC HYPERPLASIA WITHOUT LOWER URINARY TRACT SYMPTOMS: ICD-10-CM

## 2019-06-20 DIAGNOSIS — Z66 DO NOT RESUSCITATE: ICD-10-CM

## 2019-06-20 DIAGNOSIS — S06.0X9A CONCUSSION WITH LOSS OF CONSCIOUSNESS OF UNSPECIFIED DURATION, INITIAL ENCOUNTER: ICD-10-CM

## 2019-06-20 DIAGNOSIS — R40.2252 COMA SCALE, BEST VERBAL RESPONSE, ORIENTED, AT ARRIVAL TO EMERGENCY DEPARTMENT: ICD-10-CM

## 2019-06-20 DIAGNOSIS — F03.90 UNSPECIFIED DEMENTIA WITHOUT BEHAVIORAL DISTURBANCE: ICD-10-CM
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
## 2021-01-31 NOTE — PROVIDER CONTACT NOTE (OTHER) - BACKGROUND
pulse ox is 88 on room air and when placed on 3L o2 via NC it increased to 95
up mucous
water to drink on two occasions. RN again reiterated plan of care and asked family not to give pt anything by mouth. RN removed all water from room.
pt here for a subdural hematoma
GOAL: Pt will perform all bed mobility independently within 1-2 weeks

## 2023-01-03 NOTE — PATIENT PROFILE ADULT - NSASFUNCLEVELADLAMBULATE_GEN_A_NUR
Referral to hepatology for elevated liver enzymes, fatty liver. Called pt to schedule appt with any [facility] (may prefer [facility])    Scheduling attempt # 1    Pt scheduled, confirmed appt        2 = assistive person

## 2023-05-15 NOTE — CHART NOTE - NSCHARTNOTEFT_GEN_A_CORE
pt was seen in error. this is a patient of Dr. Wilder/Raina. Discussed w house staff Dr. Neil. He will consult [facility] tonight regarding concerns.  Notified Dr. Barahona. English

## 2023-10-26 NOTE — H&P ADULT - NSHPPHYSICALEXAM_GEN_ALL_CORE
No. DEMOND screening performed.  STOP BANG Legend: 0-2 = LOW Risk; 3-4 = INTERMEDIATE Risk; 5-8 = HIGH Risk Secondary Survey:   General: NAD  HEENT: Large hematoma to left parieto-temporal area  Neck: Soft, midline trachea. no cspine tenderness  Chest: No chest wall tenderness. or subq  emphysema . Large abrasions and hematoma to right upper back  Cardiac: S1, S2, RRR  Respiratory: Bilateral breath sounds, clear and equal bilaterally  Abdomen: Soft, non-distended, non-tender, no rebound,   Groin: Normal appearing, pelvis stable   Ext: palp radial b/l UE, b/l DP palp in Lower Extrem. Abrasion left forearm.  Back: no TTP, no palpable runoff/stepoff/deformity  Rectal: No nanda blood, CHAN with good tone No. EDMOND screening performed.  STOP BANG Legend: 0-2 = LOW Risk; 3-4 = INTERMEDIATE Risk; 5-8 = HIGH Risk

## 2024-01-03 NOTE — PROVIDER CONTACT NOTE (OTHER) - RECOMMENDATIONS
natalya will talk to her senior on what meds to order. she said she will let me know the decision
Home